# Patient Record
Sex: FEMALE | Race: WHITE | Employment: OTHER | URBAN - NONMETROPOLITAN AREA
[De-identification: names, ages, dates, MRNs, and addresses within clinical notes are randomized per-mention and may not be internally consistent; named-entity substitution may affect disease eponyms.]

---

## 2022-01-01 ENCOUNTER — APPOINTMENT (OUTPATIENT)
Dept: CT IMAGING | Age: 82
DRG: 266 | End: 2022-01-01
Attending: INTERNAL MEDICINE
Payer: MEDICARE

## 2022-01-01 ENCOUNTER — PREP FOR PROCEDURE (OUTPATIENT)
Dept: CARDIOLOGY | Age: 82
End: 2022-01-01

## 2022-01-01 ENCOUNTER — CARE COORDINATION (OUTPATIENT)
Dept: CARE COORDINATION | Age: 82
End: 2022-01-01

## 2022-01-01 ENCOUNTER — HOSPITAL ENCOUNTER (OUTPATIENT)
Dept: CARDIAC REHAB | Age: 82
Setting detail: THERAPIES SERIES
Discharge: HOME | End: 2022-09-01
Payer: MEDICARE

## 2022-01-01 ENCOUNTER — HOSPITAL ENCOUNTER (OUTPATIENT)
Dept: CARDIAC REHAB | Age: 82
Setting detail: THERAPIES SERIES
Discharge: HOME | End: 2022-09-08
Payer: MEDICARE

## 2022-01-01 ENCOUNTER — HOSPITAL ENCOUNTER (OUTPATIENT)
Age: 82
Setting detail: OUTPATIENT SURGERY
Discharge: HOME | End: 2022-06-23
Attending: INTERNAL MEDICINE | Admitting: INTERNAL MEDICINE
Payer: MEDICARE

## 2022-01-01 ENCOUNTER — APPOINTMENT (OUTPATIENT)
Dept: GENERAL RADIOLOGY | Age: 82
DRG: 266 | End: 2022-01-01
Attending: INTERNAL MEDICINE
Payer: MEDICARE

## 2022-01-01 ENCOUNTER — HOSPITAL ENCOUNTER (OUTPATIENT)
Age: 82
Discharge: HOME | End: 2022-07-12
Payer: MEDICARE

## 2022-01-01 ENCOUNTER — APPOINTMENT (OUTPATIENT)
Dept: CARDIAC REHAB | Age: 82
End: 2022-01-01
Payer: MEDICARE

## 2022-01-01 ENCOUNTER — TELEPHONE (OUTPATIENT)
Dept: CARDIOLOGY CLINIC | Age: 82
End: 2022-01-01

## 2022-01-01 ENCOUNTER — HOSPITAL ENCOUNTER (OUTPATIENT)
Age: 82
Setting detail: OBSERVATION
Discharge: HOME | End: 2022-05-27
Attending: FAMILY MEDICINE | Admitting: INTERNAL MEDICINE
Payer: MEDICARE

## 2022-01-01 ENCOUNTER — HOSPITAL ENCOUNTER (OUTPATIENT)
Dept: CARDIAC REHAB | Age: 82
Setting detail: THERAPIES SERIES
Discharge: HOME | End: 2022-08-26
Payer: MEDICARE

## 2022-01-01 ENCOUNTER — OFFICE VISIT (OUTPATIENT)
Dept: CARDIOTHORACIC SURGERY | Age: 82
End: 2022-01-01
Payer: MEDICARE

## 2022-01-01 ENCOUNTER — APPOINTMENT (OUTPATIENT)
Dept: CARDIAC CATH/INVASIVE PROCEDURES | Age: 82
DRG: 266 | End: 2022-01-01
Attending: INTERNAL MEDICINE
Payer: MEDICARE

## 2022-01-01 ENCOUNTER — OFFICE VISIT (OUTPATIENT)
Dept: CARDIOLOGY | Age: 82
End: 2022-01-01
Payer: MEDICARE

## 2022-01-01 ENCOUNTER — OFFICE VISIT (OUTPATIENT)
Dept: CARDIOLOGY CLINIC | Age: 82
End: 2022-01-01
Payer: MEDICARE

## 2022-01-01 ENCOUNTER — HOSPITAL ENCOUNTER (OUTPATIENT)
Age: 82
Discharge: HOME | End: 2022-05-31
Payer: MEDICARE

## 2022-01-01 ENCOUNTER — TELEPHONE (OUTPATIENT)
Dept: CARDIOLOGY | Age: 82
End: 2022-01-01

## 2022-01-01 ENCOUNTER — HOSPITAL ENCOUNTER (INPATIENT)
Age: 82
LOS: 2 days | Discharge: HOME | DRG: 247 | End: 2022-08-18
Attending: INTERNAL MEDICINE | Admitting: INTERNAL MEDICINE
Payer: MEDICARE

## 2022-01-01 ENCOUNTER — HOSPITAL ENCOUNTER (OUTPATIENT)
Dept: PULMONOLOGY | Age: 82
Discharge: HOME | End: 2022-08-10
Payer: MEDICARE

## 2022-01-01 ENCOUNTER — APPOINTMENT (OUTPATIENT)
Dept: INTERVENTIONAL RADIOLOGY/VASCULAR | Age: 82
DRG: 266 | End: 2022-01-01
Attending: INTERNAL MEDICINE
Payer: MEDICARE

## 2022-01-01 ENCOUNTER — APPOINTMENT (OUTPATIENT)
Dept: NON INVASIVE DIAGNOSTICS | Age: 82
End: 2022-01-01
Payer: MEDICARE

## 2022-01-01 ENCOUNTER — ANESTHESIA EVENT (OUTPATIENT)
Dept: CARDIAC CATH/INVASIVE PROCEDURES | Age: 82
DRG: 266 | End: 2022-01-01
Payer: MEDICARE

## 2022-01-01 ENCOUNTER — HOSPITAL ENCOUNTER (OUTPATIENT)
Dept: CARDIAC CATH/INVASIVE PROCEDURES | Age: 82
Discharge: HOME | End: 2022-06-09
Attending: FAMILY MEDICINE | Admitting: FAMILY MEDICINE
Payer: MEDICARE

## 2022-01-01 ENCOUNTER — ANESTHESIA (OUTPATIENT)
Dept: CARDIAC CATH/INVASIVE PROCEDURES | Age: 82
DRG: 266 | End: 2022-01-01
Payer: MEDICARE

## 2022-01-01 ENCOUNTER — HOSPITAL ENCOUNTER (OUTPATIENT)
Dept: CARDIAC REHAB | Age: 82
Setting detail: THERAPIES SERIES
Discharge: HOME | End: 2022-08-31
Payer: MEDICARE

## 2022-01-01 ENCOUNTER — HOSPITAL ENCOUNTER (INPATIENT)
Dept: INPATIENT UNIT | Age: 82
LOS: 6 days | DRG: 266 | End: 2022-09-20
Attending: INTERNAL MEDICINE | Admitting: INTERNAL MEDICINE
Payer: MEDICARE

## 2022-01-01 ENCOUNTER — APPOINTMENT (OUTPATIENT)
Dept: ULTRASOUND IMAGING | Age: 82
DRG: 266 | End: 2022-01-01
Attending: INTERNAL MEDICINE
Payer: MEDICARE

## 2022-01-01 ENCOUNTER — HOSPITAL ENCOUNTER (OUTPATIENT)
Dept: CARDIAC REHAB | Age: 82
Setting detail: THERAPIES SERIES
Discharge: HOME | End: 2022-09-07
Payer: MEDICARE

## 2022-01-01 ENCOUNTER — HOSPITAL ENCOUNTER (OUTPATIENT)
Dept: CARDIAC REHAB | Age: 82
Setting detail: THERAPIES SERIES
Discharge: HOME | End: 2022-08-29
Payer: MEDICARE

## 2022-01-01 ENCOUNTER — APPOINTMENT (OUTPATIENT)
Dept: GENERAL RADIOLOGY | Age: 82
End: 2022-01-01
Payer: MEDICARE

## 2022-01-01 ENCOUNTER — APPOINTMENT (OUTPATIENT)
Dept: MRI IMAGING | Age: 82
DRG: 266 | End: 2022-01-01
Attending: INTERNAL MEDICINE
Payer: MEDICARE

## 2022-01-01 ENCOUNTER — APPOINTMENT (OUTPATIENT)
Dept: CARDIAC CATH/INVASIVE PROCEDURES | Age: 82
DRG: 266 | End: 2022-01-01
Payer: MEDICARE

## 2022-01-01 VITALS
HEART RATE: 62 BPM | TEMPERATURE: 97.6 F | SYSTOLIC BLOOD PRESSURE: 142 MMHG | BODY MASS INDEX: 21.53 KG/M2 | RESPIRATION RATE: 17 BRPM | WEIGHT: 126.1 LBS | DIASTOLIC BLOOD PRESSURE: 71 MMHG | HEIGHT: 64 IN | OXYGEN SATURATION: 99 %

## 2022-01-01 VITALS
HEART RATE: 66 BPM | HEIGHT: 64 IN | RESPIRATION RATE: 18 BRPM | SYSTOLIC BLOOD PRESSURE: 124 MMHG | BODY MASS INDEX: 21.51 KG/M2 | WEIGHT: 126 LBS | DIASTOLIC BLOOD PRESSURE: 69 MMHG | OXYGEN SATURATION: 100 %

## 2022-01-01 VITALS
DIASTOLIC BLOOD PRESSURE: 77 MMHG | HEART RATE: 73 BPM | SYSTOLIC BLOOD PRESSURE: 123 MMHG | RESPIRATION RATE: 18 BRPM | WEIGHT: 112 LBS | BODY MASS INDEX: 19.12 KG/M2 | HEIGHT: 64 IN | OXYGEN SATURATION: 99 %

## 2022-01-01 VITALS
SYSTOLIC BLOOD PRESSURE: 109 MMHG | RESPIRATION RATE: 10 BRPM | OXYGEN SATURATION: 91 % | WEIGHT: 132.06 LBS | DIASTOLIC BLOOD PRESSURE: 75 MMHG | BODY MASS INDEX: 22.55 KG/M2 | HEIGHT: 64 IN | TEMPERATURE: 100.6 F | HEART RATE: 77 BPM

## 2022-01-01 VITALS
OXYGEN SATURATION: 98 % | TEMPERATURE: 97.5 F | DIASTOLIC BLOOD PRESSURE: 82 MMHG | RESPIRATION RATE: 16 BRPM | WEIGHT: 123.68 LBS | SYSTOLIC BLOOD PRESSURE: 139 MMHG | HEART RATE: 70 BPM | BODY MASS INDEX: 21.11 KG/M2 | HEIGHT: 64 IN

## 2022-01-01 VITALS
WEIGHT: 114.2 LBS | DIASTOLIC BLOOD PRESSURE: 69 MMHG | BODY MASS INDEX: 19.5 KG/M2 | HEART RATE: 72 BPM | SYSTOLIC BLOOD PRESSURE: 110 MMHG | RESPIRATION RATE: 18 BRPM | HEIGHT: 64 IN | OXYGEN SATURATION: 99 %

## 2022-01-01 VITALS
OXYGEN SATURATION: 99 % | DIASTOLIC BLOOD PRESSURE: 68 MMHG | SYSTOLIC BLOOD PRESSURE: 104 MMHG | HEIGHT: 64 IN | BODY MASS INDEX: 18.68 KG/M2 | HEART RATE: 64 BPM | RESPIRATION RATE: 16 BRPM | TEMPERATURE: 97.5 F | WEIGHT: 109.4 LBS

## 2022-01-01 VITALS
HEART RATE: 76 BPM | DIASTOLIC BLOOD PRESSURE: 52 MMHG | WEIGHT: 110 LBS | HEIGHT: 64 IN | BODY MASS INDEX: 18.78 KG/M2 | SYSTOLIC BLOOD PRESSURE: 94 MMHG

## 2022-01-01 VITALS
RESPIRATION RATE: 18 BRPM | DIASTOLIC BLOOD PRESSURE: 71 MMHG | SYSTOLIC BLOOD PRESSURE: 148 MMHG | BODY MASS INDEX: 20.86 KG/M2 | HEART RATE: 70 BPM | WEIGHT: 122.2 LBS | TEMPERATURE: 97.8 F | OXYGEN SATURATION: 94 % | HEIGHT: 64 IN

## 2022-01-01 VITALS
DIASTOLIC BLOOD PRESSURE: 86 MMHG | BODY MASS INDEX: 21.27 KG/M2 | RESPIRATION RATE: 18 BRPM | WEIGHT: 124.6 LBS | SYSTOLIC BLOOD PRESSURE: 135 MMHG | HEIGHT: 64 IN | HEART RATE: 87 BPM | OXYGEN SATURATION: 99 %

## 2022-01-01 VITALS
HEIGHT: 64 IN | OXYGEN SATURATION: 96 % | WEIGHT: 110 LBS | BODY MASS INDEX: 18.78 KG/M2 | DIASTOLIC BLOOD PRESSURE: 52 MMHG | HEART RATE: 76 BPM | SYSTOLIC BLOOD PRESSURE: 94 MMHG

## 2022-01-01 DIAGNOSIS — I34.0 NONRHEUMATIC MITRAL VALVE REGURGITATION: ICD-10-CM

## 2022-01-01 DIAGNOSIS — R55 NEAR SYNCOPE: ICD-10-CM

## 2022-01-01 DIAGNOSIS — I34.0 SEVERE MITRAL REGURGITATION BY PRIOR ECHOCARDIOGRAM: ICD-10-CM

## 2022-01-01 DIAGNOSIS — I25.10 CORONARY ARTERY DISEASE INVOLVING NATIVE CORONARY ARTERY OF NATIVE HEART WITHOUT ANGINA PECTORIS: Primary | ICD-10-CM

## 2022-01-01 DIAGNOSIS — E78.2 MIXED HYPERLIPIDEMIA: ICD-10-CM

## 2022-01-01 DIAGNOSIS — R93.1 ABNORMAL FINDINGS ON CARDIAC CATHETERIZATION: Primary | ICD-10-CM

## 2022-01-01 DIAGNOSIS — R06.02 SOB (SHORTNESS OF BREATH): ICD-10-CM

## 2022-01-01 DIAGNOSIS — I42.9 IDIOPATHIC CARDIOMYOPATHY (HCC): ICD-10-CM

## 2022-01-01 DIAGNOSIS — I25.5 ISCHEMIC CARDIOMYOPATHY: ICD-10-CM

## 2022-01-01 DIAGNOSIS — I35.0 AORTIC STENOSIS, SEVERE: ICD-10-CM

## 2022-01-01 DIAGNOSIS — I34.0 SEVERE MITRAL REGURGITATION: Primary | ICD-10-CM

## 2022-01-01 DIAGNOSIS — R93.1 ABNORMAL FINDINGS ON CARDIAC CATHETERIZATION: ICD-10-CM

## 2022-01-01 DIAGNOSIS — I25.10 CORONARY ARTERY DISEASE INVOLVING NATIVE CORONARY ARTERY OF NATIVE HEART WITHOUT ANGINA PECTORIS: ICD-10-CM

## 2022-01-01 DIAGNOSIS — I34.0 NONRHEUMATIC MITRAL VALVE REGURGITATION: Primary | ICD-10-CM

## 2022-01-01 DIAGNOSIS — I34.0 SEVERE MITRAL REGURGITATION: ICD-10-CM

## 2022-01-01 DIAGNOSIS — I10 PRIMARY HYPERTENSION: ICD-10-CM

## 2022-01-01 DIAGNOSIS — N17.9 AKI (ACUTE KIDNEY INJURY) (HCC): ICD-10-CM

## 2022-01-01 DIAGNOSIS — I27.20 SEVERE PULMONARY HYPERTENSION (HCC): ICD-10-CM

## 2022-01-01 DIAGNOSIS — R58 RETROPERITONEAL BLEED: ICD-10-CM

## 2022-01-01 DIAGNOSIS — I25.10 ASHD (ARTERIOSCLEROTIC HEART DISEASE): Primary | ICD-10-CM

## 2022-01-01 DIAGNOSIS — R42 LIGHTHEADED: ICD-10-CM

## 2022-01-01 DIAGNOSIS — R55 NEAR SYNCOPE: Primary | ICD-10-CM

## 2022-01-01 DIAGNOSIS — I50.22 CHRONIC SYSTOLIC CONGESTIVE HEART FAILURE, NYHA CLASS 2 (HCC): Primary | ICD-10-CM

## 2022-01-01 DIAGNOSIS — I42.0 DILATED CARDIOMYOPATHY (HCC): ICD-10-CM

## 2022-01-01 DIAGNOSIS — I34.0 SEVERE MITRAL REGURGITATION BY PRIOR ECHOCARDIOGRAM: Primary | ICD-10-CM

## 2022-01-01 DIAGNOSIS — I10 ESSENTIAL HYPERTENSION: ICD-10-CM

## 2022-01-01 DIAGNOSIS — R94.31 ABNORMAL EKG: ICD-10-CM

## 2022-01-01 DIAGNOSIS — I05.0 MILD MITRAL STENOSIS: ICD-10-CM

## 2022-01-01 DIAGNOSIS — I50.22 CHRONIC SYSTOLIC CONGESTIVE HEART FAILURE, NYHA CLASS 2 (HCC): ICD-10-CM

## 2022-01-01 DIAGNOSIS — R77.8 ELEVATED TROPONIN: ICD-10-CM

## 2022-01-01 LAB
-: NORMAL
ABO: NORMAL
ABSOLUTE EOS #: 0.04 K/UL (ref 0–0.44)
ABSOLUTE EOS #: 0.05 K/UL (ref 0–0.44)
ABSOLUTE EOS #: 0.08 K/UL (ref 0–0.44)
ABSOLUTE EOS #: <0.03 K/UL (ref 0–0.44)
ABSOLUTE IMMATURE GRANULOCYTE: <0.03 K/UL (ref 0–0.3)
ABSOLUTE LYMPH #: 1.22 K/UL (ref 1.1–3.7)
ABSOLUTE LYMPH #: 1.39 K/UL (ref 1.1–3.7)
ABSOLUTE LYMPH #: 1.76 K/UL (ref 1.1–3.7)
ABSOLUTE LYMPH #: 1.9 K/UL (ref 1.1–3.7)
ABSOLUTE MONO #: 0.56 K/UL (ref 0.1–1.2)
ABSOLUTE MONO #: 0.61 K/UL (ref 0.1–1.2)
ABSOLUTE MONO #: 0.67 K/UL (ref 0.1–1.2)
ABSOLUTE MONO #: 0.68 K/UL (ref 0.1–1.2)
ACINETOBACTER CALCOACETICUS-BAUMANNII BY PCR: NOT DETECTED
ACTIVATED CLOTTING TIME: 167 SECONDS (ref 1–150)
ACTIVATED CLOTTING TIME: 271 SECONDS (ref 1–150)
ACTIVATED CLOTTING TIME: 335 SECONDS (ref 1–150)
ACTIVATED CLOTTING TIME: 387 SECONDS (ref 1–150)
ADENOVIRUS BY PCR: NOT DETECTED
ALBUMIN SERPL-MCNC: 3.1 G/DL (ref 3.5–5.1)
ALBUMIN SERPL-MCNC: 3.4 G/DL (ref 3.5–5.1)
ALBUMIN SERPL-MCNC: 3.7 G/DL (ref 3.5–5.1)
ALBUMIN SERPL-MCNC: 3.8 G/DL (ref 3.5–5.1)
ALBUMIN SERPL-MCNC: 4 G/DL (ref 3.5–5.1)
ALBUMIN SERPL-MCNC: 4.2 G/DL (ref 3.5–5.2)
ALBUMIN SERPL-MCNC: 4.2 G/DL (ref 3.5–5.2)
ALBUMIN SERPL-MCNC: 4.3 G/DL (ref 3.5–5.1)
ALBUMIN SERPL-MCNC: 4.5 G/DL (ref 3.5–5.1)
ALBUMIN SERPL-MCNC: 4.7 G/DL (ref 3.5–5.1)
ALBUMIN/GLOBULIN RATIO: 1.6 (ref 1–2.5)
ALBUMIN/GLOBULIN RATIO: 1.8 (ref 1–2.5)
ALLEN TEST: ABNORMAL
ALLEN TEST: POSITIVE
ALLEN TEST: POSITIVE
ALP BLD-CCNC: 105 U/L (ref 38–126)
ALP BLD-CCNC: 106 U/L (ref 38–126)
ALP BLD-CCNC: 113 U/L (ref 38–126)
ALP BLD-CCNC: 187 U/L (ref 38–126)
ALP BLD-CCNC: 236 U/L (ref 38–126)
ALP BLD-CCNC: 41 U/L (ref 38–126)
ALP BLD-CCNC: 69 U/L (ref 35–104)
ALP BLD-CCNC: 78 U/L (ref 35–104)
ALP BLD-CCNC: 78 U/L (ref 38–126)
ALP BLD-CCNC: 81 U/L (ref 38–126)
ALT SERPL-CCNC: 10 U/L (ref 11–66)
ALT SERPL-CCNC: 13 U/L (ref 5–33)
ALT SERPL-CCNC: 19 U/L (ref 11–66)
ALT SERPL-CCNC: 21 U/L (ref 5–33)
ALT SERPL-CCNC: 210 U/L (ref 11–66)
ALT SERPL-CCNC: 244 U/L (ref 11–66)
ALT SERPL-CCNC: 425 U/L (ref 11–66)
ALT SERPL-CCNC: 494 U/L (ref 11–66)
ALT SERPL-CCNC: 509 U/L (ref 11–66)
ALT SERPL-CCNC: 637 U/L (ref 11–66)
ANION GAP SERPL CALCULATED.3IONS-SCNC: 10 MMOL/L (ref 9–17)
ANION GAP SERPL CALCULATED.3IONS-SCNC: 10 MMOL/L (ref 9–17)
ANION GAP SERPL CALCULATED.3IONS-SCNC: 11 MEQ/L (ref 8–16)
ANION GAP SERPL CALCULATED.3IONS-SCNC: 11 MEQ/L (ref 8–16)
ANION GAP SERPL CALCULATED.3IONS-SCNC: 12 MEQ/L (ref 8–16)
ANION GAP SERPL CALCULATED.3IONS-SCNC: 12 MMOL/L (ref 9–17)
ANION GAP SERPL CALCULATED.3IONS-SCNC: 13 MEQ/L (ref 8–16)
ANION GAP SERPL CALCULATED.3IONS-SCNC: 14 MMOL/L (ref 9–17)
ANION GAP SERPL CALCULATED.3IONS-SCNC: 15 MEQ/L (ref 8–16)
ANION GAP SERPL CALCULATED.3IONS-SCNC: 16 MEQ/L (ref 8–16)
ANION GAP SERPL CALCULATED.3IONS-SCNC: 17 MEQ/L (ref 8–16)
ANION GAP SERPL CALCULATED.3IONS-SCNC: 17 MEQ/L (ref 8–16)
ANION GAP SERPL CALCULATED.3IONS-SCNC: 18 MEQ/L (ref 8–16)
ANION GAP SERPL CALCULATED.3IONS-SCNC: 20 MEQ/L (ref 8–16)
ANION GAP SERPL CALCULATED.3IONS-SCNC: 20 MEQ/L (ref 8–16)
ANION GAP SERPL CALCULATED.3IONS-SCNC: 22 MEQ/L (ref 8–16)
ANION GAP SERPL CALCULATED.3IONS-SCNC: 23 MEQ/L (ref 8–16)
ANION GAP SERPL CALCULATED.3IONS-SCNC: 29 MEQ/L (ref 8–16)
ANION GAP SERPL CALCULATED.3IONS-SCNC: 8 MEQ/L (ref 8–16)
ANION GAP SERPL CALCULATED.3IONS-SCNC: 9 MMOL/L (ref 9–17)
ANTIBODY SCREEN: NORMAL
APTT: 110.3 SECONDS (ref 22–38)
APTT: 32.8 SECONDS (ref 22–38)
AST SERPL-CCNC: 1322 U/L (ref 5–40)
AST SERPL-CCNC: 1658 U/L (ref 5–40)
AST SERPL-CCNC: 17 U/L
AST SERPL-CCNC: 19 U/L (ref 5–40)
AST SERPL-CCNC: 24 U/L
AST SERPL-CCNC: 245 U/L (ref 5–40)
AST SERPL-CCNC: 38 U/L (ref 5–40)
AST SERPL-CCNC: 663 U/L (ref 5–40)
AST SERPL-CCNC: 714 U/L (ref 5–40)
AST SERPL-CCNC: > 7000 U/L (ref 5–40)
AVERAGE GLUCOSE: 114 MG/DL (ref 70–126)
AVERAGE GLUCOSE: 150 MG/DL (ref 70–126)
BASE EXCESS (CALCULATED): -10.5 MMOL/L (ref -2.5–2.5)
BASE EXCESS (CALCULATED): -10.5 MMOL/L (ref -2.5–2.5)
BASE EXCESS (CALCULATED): -13.6 MMOL/L (ref -2.5–2.5)
BASE EXCESS (CALCULATED): -2 MMOL/L (ref -2.5–2.5)
BASE EXCESS (CALCULATED): -8.5 MMOL/L (ref -2.5–2.5)
BASE EXCESS (CALCULATED): 11.3 MMOL/L (ref -2.5–2.5)
BASE EXCESS (CALCULATED): 2.5 MMOL/L (ref -2.5–2.5)
BASE EXCESS (CALCULATED): 3 MMOL/L (ref -2.5–2.5)
BASE EXCESS (CALCULATED): 5.6 MMOL/L (ref -2.5–2.5)
BASE EXCESS (CALCULATED): 5.8 MMOL/L (ref -2.5–2.5)
BASE EXCESS (CALCULATED): 7.5 MMOL/L (ref -2.5–2.5)
BASE EXCESS (CALCULATED): 7.6 MMOL/L (ref -2.5–2.5)
BASE EXCESS (CALCULATED): 8.8 MMOL/L (ref -2.5–2.5)
BASE EXCESS MIXED: -1.5 MMOL/L (ref -2–3)
BASE EXCESS MIXED: -2.1 MMOL/L (ref -2–3)
BASE EXCESS MIXED: 2.7 MMOL/L (ref -2–3)
BASOPHILS # BLD: 0 %
BASOPHILS # BLD: 0 % (ref 0–2)
BASOPHILS # BLD: 0.1 %
BASOPHILS # BLD: 0.2 %
BASOPHILS # BLD: 0.2 %
BASOPHILS # BLD: 1 % (ref 0–2)
BASOPHILS ABSOLUTE: 0 THOU/MM3 (ref 0–0.1)
BASOPHILS ABSOLUTE: 0.03 K/UL (ref 0–0.2)
BASOPHILS ABSOLUTE: <0.03 K/UL (ref 0–0.2)
BETA-HYDROXYBUTYRATE: 3.55 MG/DL (ref 0.2–2.81)
BILIRUB SERPL-MCNC: 0.38 MG/DL (ref 0.3–1.2)
BILIRUB SERPL-MCNC: 0.5 MG/DL (ref 0.3–1.2)
BILIRUB SERPL-MCNC: 0.66 MG/DL (ref 0.3–1.2)
BILIRUB SERPL-MCNC: 1 MG/DL (ref 0.3–1.2)
BILIRUB SERPL-MCNC: 2.4 MG/DL (ref 0.3–1.2)
BILIRUB SERPL-MCNC: 3.7 MG/DL (ref 0.3–1.2)
BILIRUB SERPL-MCNC: 4 MG/DL (ref 0.3–1.2)
BILIRUB SERPL-MCNC: 5 MG/DL (ref 0.3–1.2)
BILIRUB SERPL-MCNC: 5.3 MG/DL (ref 0.3–1.2)
BILIRUB SERPL-MCNC: 5.5 MG/DL (ref 0.3–1.2)
BILIRUBIN DIRECT: 1.1 MG/DL (ref 0–0.3)
BILIRUBIN DIRECT: 1.6 MG/DL (ref 0–0.3)
BILIRUBIN URINE: NEGATIVE
BILIRUBIN URINE: NEGATIVE
BLOOD, URINE: NEGATIVE
BUN BLDV-MCNC: 16 MG/DL (ref 8–23)
BUN BLDV-MCNC: 18 MG/DL (ref 8–23)
BUN BLDV-MCNC: 20 MG/DL (ref 7–22)
BUN BLDV-MCNC: 20 MG/DL (ref 8–23)
BUN BLDV-MCNC: 20 MG/DL (ref 8–23)
BUN BLDV-MCNC: 21 MG/DL (ref 7–22)
BUN BLDV-MCNC: 21 MG/DL (ref 7–22)
BUN BLDV-MCNC: 22 MG/DL (ref 8–23)
BUN BLDV-MCNC: 23 MG/DL (ref 7–22)
BUN BLDV-MCNC: 24 MG/DL (ref 7–22)
BUN BLDV-MCNC: 24 MG/DL (ref 7–22)
BUN BLDV-MCNC: 25 MG/DL (ref 7–22)
BUN BLDV-MCNC: 25 MG/DL (ref 7–22)
BUN BLDV-MCNC: 27 MG/DL (ref 7–22)
BUN BLDV-MCNC: 27 MG/DL (ref 7–22)
BUN BLDV-MCNC: 29 MG/DL (ref 7–22)
BUN BLDV-MCNC: 29 MG/DL (ref 7–22)
BUN BLDV-MCNC: 31 MG/DL (ref 7–22)
BUN BLDV-MCNC: 36 MG/DL (ref 7–22)
BUN BLDV-MCNC: 43 MG/DL (ref 7–22)
BUN BLDV-MCNC: 48 MG/DL (ref 7–22)
BUN BLDV-MCNC: 52 MG/DL (ref 7–22)
BUN BLDV-MCNC: 54 MG/DL (ref 7–22)
BUN BLDV-MCNC: 54 MG/DL (ref 7–22)
BUN BLDV-MCNC: 56 MG/DL (ref 7–22)
BUN/CREAT BLD: 11 (ref 9–20)
BUN/CREAT BLD: 13 (ref 9–20)
BUN/CREAT BLD: 14 (ref 9–20)
CALCIUM IONIZED SERUM: 1.07 MMOL/L (ref 1.12–1.32)
CALCIUM IONIZED SERUM: 2.85 MMOL/L (ref 1.12–1.32)
CALCIUM IONIZED: 0.85 MMOL/L (ref 1.12–1.32)
CALCIUM IONIZED: 0.93 MMOL/L (ref 1.12–1.32)
CALCIUM IONIZED: 0.94 MMOL/L (ref 1.12–1.32)
CALCIUM IONIZED: 1.01 MMOL/L (ref 1.12–1.32)
CALCIUM IONIZED: 1.04 MMOL/L (ref 1.12–1.32)
CALCIUM IONIZED: 1.05 MMOL/L (ref 1.12–1.32)
CALCIUM IONIZED: 1.09 MMOL/L (ref 1.12–1.32)
CALCIUM IONIZED: 1.13 MMOL/L (ref 1.12–1.32)
CALCIUM IONIZED: 1.25 MMOL/L (ref 1.12–1.32)
CALCIUM SERPL-MCNC: 10 MG/DL (ref 8.5–10.5)
CALCIUM SERPL-MCNC: 10.2 MG/DL (ref 8.5–10.5)
CALCIUM SERPL-MCNC: 10.2 MG/DL (ref 8.5–10.5)
CALCIUM SERPL-MCNC: 8.2 MG/DL (ref 8.5–10.5)
CALCIUM SERPL-MCNC: 8.4 MG/DL (ref 8.5–10.5)
CALCIUM SERPL-MCNC: 8.5 MG/DL (ref 8.5–10.5)
CALCIUM SERPL-MCNC: 8.5 MG/DL (ref 8.5–10.5)
CALCIUM SERPL-MCNC: 8.5 MG/DL (ref 8.6–10.4)
CALCIUM SERPL-MCNC: 8.6 MG/DL (ref 8.5–10.5)
CALCIUM SERPL-MCNC: 8.6 MG/DL (ref 8.5–10.5)
CALCIUM SERPL-MCNC: 8.7 MG/DL (ref 8.6–10.4)
CALCIUM SERPL-MCNC: 8.8 MG/DL (ref 8.5–10.5)
CALCIUM SERPL-MCNC: 8.9 MG/DL (ref 8.5–10.5)
CALCIUM SERPL-MCNC: 9 MG/DL (ref 8.5–10.5)
CALCIUM SERPL-MCNC: 9 MG/DL (ref 8.6–10.4)
CALCIUM SERPL-MCNC: 9 MG/DL (ref 8.6–10.4)
CALCIUM SERPL-MCNC: 9.1 MG/DL (ref 8.6–10.4)
CALCIUM SERPL-MCNC: 9.2 MG/DL (ref 8.5–10.5)
CALCIUM SERPL-MCNC: 9.2 MG/DL (ref 8.5–10.5)
CALCIUM SERPL-MCNC: 9.3 MG/DL (ref 8.5–10.5)
CALCIUM SERPL-MCNC: 9.4 MG/DL (ref 8.5–10.5)
CALCIUM SERPL-MCNC: 9.6 MG/DL (ref 8.5–10.5)
CALCIUM SERPL-MCNC: 9.6 MG/DL (ref 8.5–10.5)
CALCIUM SERPL-MCNC: 9.7 MG/DL (ref 8.5–10.5)
CALCIUM SERPL-MCNC: 9.7 MG/DL (ref 8.5–10.5)
CHARACTER, URINE: CLEAR
CHLAMYDIA PNEUMONIAE BY PCR: NOT DETECTED
CHLORIDE BLD-SCNC: 100 MEQ/L (ref 98–111)
CHLORIDE BLD-SCNC: 100 MMOL/L (ref 98–107)
CHLORIDE BLD-SCNC: 101 MEQ/L (ref 98–111)
CHLORIDE BLD-SCNC: 101 MMOL/L (ref 98–107)
CHLORIDE BLD-SCNC: 102 MEQ/L (ref 98–111)
CHLORIDE BLD-SCNC: 103 MEQ/L (ref 98–111)
CHLORIDE BLD-SCNC: 104 MEQ/L (ref 98–111)
CHLORIDE BLD-SCNC: 105 MEQ/L (ref 98–111)
CHLORIDE BLD-SCNC: 106 MEQ/L (ref 98–111)
CHLORIDE BLD-SCNC: 107 MEQ/L (ref 98–111)
CHLORIDE BLD-SCNC: 107 MEQ/L (ref 98–111)
CHLORIDE BLD-SCNC: 109 MEQ/L (ref 98–111)
CHLORIDE BLD-SCNC: 111 MEQ/L (ref 98–111)
CHLORIDE BLD-SCNC: 95 MMOL/L (ref 98–107)
CHLORIDE BLD-SCNC: 98 MEQ/L (ref 98–111)
CHLORIDE BLD-SCNC: 99 MEQ/L (ref 98–111)
CHLORIDE BLD-SCNC: 99 MEQ/L (ref 98–111)
CHLORIDE, WHOLE BLOOD: 105 MEQ/L (ref 98–109)
CHLORIDE, WHOLE BLOOD: 108 MEQ/L (ref 98–109)
CHOLESTEROL, TOTAL: 81 MG/DL (ref 100–199)
CHOLESTEROL/HDL RATIO: 3.2
CHOLESTEROL/HDL RATIO: 4
CHOLESTEROL: 129 MG/DL
CHOLESTEROL: 84 MG/DL
CO2: 16 MEQ/L (ref 23–33)
CO2: 18 MEQ/L (ref 23–33)
CO2: 18 MEQ/L (ref 23–33)
CO2: 19 MEQ/L (ref 23–33)
CO2: 21 MEQ/L (ref 23–33)
CO2: 22 MEQ/L (ref 23–33)
CO2: 22 MEQ/L (ref 23–33)
CO2: 22 MMOL/L (ref 20–31)
CO2: 23 MEQ/L (ref 23–33)
CO2: 23 MEQ/L (ref 23–33)
CO2: 23 MMOL/L (ref 20–31)
CO2: 24 MEQ/L (ref 23–33)
CO2: 24 MMOL/L (ref 20–31)
CO2: 25 MEQ/L (ref 23–33)
CO2: 26 MEQ/L (ref 23–33)
CO2: 26 MEQ/L (ref 23–33)
CO2: 27 MEQ/L (ref 23–33)
CO2: 28 MEQ/L (ref 23–33)
CO2: 30 MEQ/L (ref 23–33)
CO2: 31 MEQ/L (ref 23–33)
COLLECTED BY:: ABNORMAL
COLLECTED BY:: NORMAL
COLLECTED BY:: NORMAL
COLOR: YELLOW
COLOR: YELLOW
CORTISOL COLLECTION INFO: NORMAL
CORTISOL: 24.19 UG/DL
CREAT SERPL-MCNC: 1.16 MG/DL (ref 0.5–0.9)
CREAT SERPL-MCNC: 1.2 MG/DL (ref 0.4–1.2)
CREAT SERPL-MCNC: 1.29 MG/DL (ref 0.5–0.9)
CREAT SERPL-MCNC: 1.3 MG/DL (ref 0.4–1.2)
CREAT SERPL-MCNC: 1.4 MG/DL (ref 0.4–1.2)
CREAT SERPL-MCNC: 1.46 MG/DL (ref 0.5–0.9)
CREAT SERPL-MCNC: 1.5 MG/DL (ref 0.4–1.2)
CREAT SERPL-MCNC: 1.53 MG/DL (ref 0.5–0.9)
CREAT SERPL-MCNC: 1.6 MG/DL (ref 0.4–1.2)
CREAT SERPL-MCNC: 1.7 MG/DL (ref 0.4–1.2)
CREAT SERPL-MCNC: 1.8 MG/DL (ref 0.4–1.2)
CREAT SERPL-MCNC: 1.8 MG/DL (ref 0.4–1.2)
CREAT SERPL-MCNC: 2.09 MG/DL (ref 0.5–0.9)
CREAT SERPL-MCNC: 2.1 MG/DL (ref 0.4–1.2)
CREAT SERPL-MCNC: 2.2 MG/DL (ref 0.4–1.2)
DEVICE: ABNORMAL
DEVICE: NORMAL
DEVICE: NORMAL
DISTANCE WALKED: 1132 FT
EKG ATRIAL RATE: 100 BPM
EKG ATRIAL RATE: 105 BPM
EKG ATRIAL RATE: 133 BPM
EKG ATRIAL RATE: 44 BPM
EKG ATRIAL RATE: 61 BPM
EKG ATRIAL RATE: 66 BPM
EKG ATRIAL RATE: 72 BPM
EKG ATRIAL RATE: 78 BPM
EKG ATRIAL RATE: 83 BPM
EKG ATRIAL RATE: 88 BPM
EKG ATRIAL RATE: 96 BPM
EKG P AXIS: -13 DEGREES
EKG P AXIS: -25 DEGREES
EKG P AXIS: -50 DEGREES
EKG P AXIS: 16 DEGREES
EKG P AXIS: 20 DEGREES
EKG P AXIS: 34 DEGREES
EKG P AXIS: 52 DEGREES
EKG P AXIS: 58 DEGREES
EKG P AXIS: 64 DEGREES
EKG P AXIS: 67 DEGREES
EKG P AXIS: 68 DEGREES
EKG P-R INTERVAL: 112 MS
EKG P-R INTERVAL: 136 MS
EKG P-R INTERVAL: 146 MS
EKG P-R INTERVAL: 148 MS
EKG P-R INTERVAL: 154 MS
EKG P-R INTERVAL: 160 MS
EKG P-R INTERVAL: 162 MS
EKG P-R INTERVAL: 164 MS
EKG P-R INTERVAL: 170 MS
EKG P-R INTERVAL: 170 MS
EKG P-R INTERVAL: 96 MS
EKG Q-T INTERVAL: 232 MS
EKG Q-T INTERVAL: 346 MS
EKG Q-T INTERVAL: 352 MS
EKG Q-T INTERVAL: 370 MS
EKG Q-T INTERVAL: 372 MS
EKG Q-T INTERVAL: 384 MS
EKG Q-T INTERVAL: 414 MS
EKG Q-T INTERVAL: 442 MS
EKG Q-T INTERVAL: 462 MS
EKG Q-T INTERVAL: 474 MS
EKG Q-T INTERVAL: 476 MS
EKG Q-T INTERVAL: 484 MS
EKG Q-T INTERVAL: 502 MS
EKG QRS DURATION: 110 MS
EKG QRS DURATION: 112 MS
EKG QRS DURATION: 112 MS
EKG QRS DURATION: 114 MS
EKG QRS DURATION: 114 MS
EKG QRS DURATION: 118 MS
EKG QRS DURATION: 120 MS
EKG QRS DURATION: 122 MS
EKG QRS DURATION: 124 MS
EKG QRS DURATION: 126 MS
EKG QRS DURATION: 132 MS
EKG QRS DURATION: 136 MS
EKG QRS DURATION: 150 MS
EKG QTC CALCULATION (BAZETT): 410 MS
EKG QTC CALCULATION (BAZETT): 413 MS
EKG QTC CALCULATION (BAZETT): 461 MS
EKG QTC CALCULATION (BAZETT): 469 MS
EKG QTC CALCULATION (BAZETT): 489 MS
EKG QTC CALCULATION (BAZETT): 495 MS
EKG QTC CALCULATION (BAZETT): 499 MS
EKG QTC CALCULATION (BAZETT): 500 MS
EKG QTC CALCULATION (BAZETT): 503 MS
EKG QTC CALCULATION (BAZETT): 505 MS
EKG QTC CALCULATION (BAZETT): 505 MS
EKG QTC CALCULATION (BAZETT): 514 MS
EKG QTC CALCULATION (BAZETT): 556 MS
EKG R AXIS: -15 DEGREES
EKG R AXIS: -30 DEGREES
EKG R AXIS: -35 DEGREES
EKG R AXIS: -36 DEGREES
EKG R AXIS: -43 DEGREES
EKG R AXIS: -43 DEGREES
EKG R AXIS: -47 DEGREES
EKG R AXIS: -48 DEGREES
EKG R AXIS: -53 DEGREES
EKG R AXIS: -56 DEGREES
EKG R AXIS: 100 DEGREES
EKG R AXIS: 60 DEGREES
EKG R AXIS: 87 DEGREES
EKG T AXIS: -38 DEGREES
EKG T AXIS: 103 DEGREES
EKG T AXIS: 106 DEGREES
EKG T AXIS: 113 DEGREES
EKG T AXIS: 116 DEGREES
EKG T AXIS: 34 DEGREES
EKG T AXIS: 47 DEGREES
EKG T AXIS: 49 DEGREES
EKG T AXIS: 55 DEGREES
EKG T AXIS: 63 DEGREES
EKG T AXIS: 70 DEGREES
EKG T AXIS: 74 DEGREES
EKG T AXIS: 96 DEGREES
EKG VENTRICULAR RATE: 100 BPM
EKG VENTRICULAR RATE: 103 BPM
EKG VENTRICULAR RATE: 105 BPM
EKG VENTRICULAR RATE: 133 BPM
EKG VENTRICULAR RATE: 188 BPM
EKG VENTRICULAR RATE: 44 BPM
EKG VENTRICULAR RATE: 61 BPM
EKG VENTRICULAR RATE: 66 BPM
EKG VENTRICULAR RATE: 72 BPM
EKG VENTRICULAR RATE: 78 BPM
EKG VENTRICULAR RATE: 83 BPM
EKG VENTRICULAR RATE: 88 BPM
EKG VENTRICULAR RATE: 96 BPM
ENTEROBACTER CLOACAE COMPLEX BY PCR: NOT DETECTED
EOSINOPHIL # BLD: 0 %
EOSINOPHIL # BLD: 0.6 %
EOSINOPHILS ABSOLUTE: 0 THOU/MM3 (ref 0–0.4)
EOSINOPHILS ABSOLUTE: 0.1 THOU/MM3 (ref 0–0.4)
EOSINOPHILS RELATIVE PERCENT: 0 % (ref 1–4)
EOSINOPHILS RELATIVE PERCENT: 1 % (ref 1–4)
EPITHELIAL CELLS UA: NORMAL /HPF (ref 0–25)
ERYTHROCYTE [DISTWIDTH] IN BLOOD BY AUTOMATED COUNT: 14.2 % (ref 11.5–14.5)
ERYTHROCYTE [DISTWIDTH] IN BLOOD BY AUTOMATED COUNT: 14.3 % (ref 11.5–14.5)
ERYTHROCYTE [DISTWIDTH] IN BLOOD BY AUTOMATED COUNT: 14.7 % (ref 11.5–14.5)
ERYTHROCYTE [DISTWIDTH] IN BLOOD BY AUTOMATED COUNT: 15.1 % (ref 11.5–14.5)
ERYTHROCYTE [DISTWIDTH] IN BLOOD BY AUTOMATED COUNT: 15.4 % (ref 11.5–14.5)
ERYTHROCYTE [DISTWIDTH] IN BLOOD BY AUTOMATED COUNT: 15.4 % (ref 11.5–14.5)
ERYTHROCYTE [DISTWIDTH] IN BLOOD BY AUTOMATED COUNT: 15.7 % (ref 11.5–14.5)
ERYTHROCYTE [DISTWIDTH] IN BLOOD BY AUTOMATED COUNT: 15.8 % (ref 11.5–14.5)
ERYTHROCYTE [DISTWIDTH] IN BLOOD BY AUTOMATED COUNT: 15.8 % (ref 11.5–14.5)
ERYTHROCYTE [DISTWIDTH] IN BLOOD BY AUTOMATED COUNT: 15.9 % (ref 11.5–14.5)
ERYTHROCYTE [DISTWIDTH] IN BLOOD BY AUTOMATED COUNT: 16 % (ref 11.5–14.5)
ERYTHROCYTE [DISTWIDTH] IN BLOOD BY AUTOMATED COUNT: 16.1 % (ref 11.5–14.5)
ERYTHROCYTE [DISTWIDTH] IN BLOOD BY AUTOMATED COUNT: 16.2 % (ref 11.5–14.5)
ERYTHROCYTE [DISTWIDTH] IN BLOOD BY AUTOMATED COUNT: 16.5 % (ref 11.5–14.5)
ERYTHROCYTE [DISTWIDTH] IN BLOOD BY AUTOMATED COUNT: 17.1 % (ref 11.5–14.5)
ERYTHROCYTE [DISTWIDTH] IN BLOOD BY AUTOMATED COUNT: 44.6 FL (ref 35–45)
ERYTHROCYTE [DISTWIDTH] IN BLOOD BY AUTOMATED COUNT: 45.8 FL (ref 35–45)
ERYTHROCYTE [DISTWIDTH] IN BLOOD BY AUTOMATED COUNT: 46 FL (ref 35–45)
ERYTHROCYTE [DISTWIDTH] IN BLOOD BY AUTOMATED COUNT: 47.1 FL (ref 35–45)
ERYTHROCYTE [DISTWIDTH] IN BLOOD BY AUTOMATED COUNT: 48.7 FL (ref 35–45)
ERYTHROCYTE [DISTWIDTH] IN BLOOD BY AUTOMATED COUNT: 49.1 FL (ref 35–45)
ERYTHROCYTE [DISTWIDTH] IN BLOOD BY AUTOMATED COUNT: 49.5 FL (ref 35–45)
ERYTHROCYTE [DISTWIDTH] IN BLOOD BY AUTOMATED COUNT: 50.3 FL (ref 35–45)
ERYTHROCYTE [DISTWIDTH] IN BLOOD BY AUTOMATED COUNT: 51 FL (ref 35–45)
ERYTHROCYTE [DISTWIDTH] IN BLOOD BY AUTOMATED COUNT: 51.3 FL (ref 35–45)
ERYTHROCYTE [DISTWIDTH] IN BLOOD BY AUTOMATED COUNT: 52.8 FL (ref 35–45)
ERYTHROCYTE [DISTWIDTH] IN BLOOD BY AUTOMATED COUNT: 53.4 FL (ref 35–45)
ERYTHROCYTE [DISTWIDTH] IN BLOOD BY AUTOMATED COUNT: 53.9 FL (ref 35–45)
ERYTHROCYTE [DISTWIDTH] IN BLOOD BY AUTOMATED COUNT: 54.3 FL (ref 35–45)
ERYTHROCYTE [DISTWIDTH] IN BLOOD BY AUTOMATED COUNT: 57.7 FL (ref 35–45)
ESCHERICHIA COLI BY PCR: NOT DETECTED
ESTIMATED AVERAGE GLUCOSE: 137 MG/DL
ESTIMATED AVERAGE GLUCOSE: 169 MG/DL
FIO2, MIXED VENOUS: 30
FIO2, MIXED VENOUS: 50
FIO2, MIXED VENOUS: 70
GFR AFRICAN AMERICAN: 27 ML/MIN
GFR AFRICAN AMERICAN: 39 ML/MIN
GFR AFRICAN AMERICAN: 42 ML/MIN
GFR AFRICAN AMERICAN: 48 ML/MIN
GFR AFRICAN AMERICAN: 54 ML/MIN
GFR NON-AFRICAN AMERICAN: 23 ML/MIN
GFR NON-AFRICAN AMERICAN: 32 ML/MIN
GFR NON-AFRICAN AMERICAN: 34 ML/MIN
GFR NON-AFRICAN AMERICAN: 40 ML/MIN
GFR NON-AFRICAN AMERICAN: 45 ML/MIN
GFR SERPL CREATININE-BSD FRML MDRD: 21 ML/MIN/1.73M2
GFR SERPL CREATININE-BSD FRML MDRD: 23 ML/MIN/1.73M2
GFR SERPL CREATININE-BSD FRML MDRD: 27 ML/MIN/1.73M2
GFR SERPL CREATININE-BSD FRML MDRD: 27 ML/MIN/1.73M2
GFR SERPL CREATININE-BSD FRML MDRD: 29 ML/MIN/1.73M2
GFR SERPL CREATININE-BSD FRML MDRD: 31 ML/MIN/1.73M2
GFR SERPL CREATININE-BSD FRML MDRD: 33 ML/MIN/1.73M2
GFR SERPL CREATININE-BSD FRML MDRD: 36 ML/MIN/1.73M2
GFR SERPL CREATININE-BSD FRML MDRD: 39 ML/MIN/1.73M2
GFR SERPL CREATININE-BSD FRML MDRD: 43 ML/MIN/1.73M2
GFR SERPL CREATININE-BSD FRML MDRD: ABNORMAL ML/MIN/{1.73_M2}
GLUCOSE BLD-MCNC: 106 MG/DL (ref 70–108)
GLUCOSE BLD-MCNC: 111 MG/DL (ref 70–108)
GLUCOSE BLD-MCNC: 112 MG/DL (ref 70–108)
GLUCOSE BLD-MCNC: 113 MG/DL (ref 70–99)
GLUCOSE BLD-MCNC: 116 MG/DL (ref 70–108)
GLUCOSE BLD-MCNC: 117 MG/DL (ref 70–108)
GLUCOSE BLD-MCNC: 117 MG/DL (ref 70–99)
GLUCOSE BLD-MCNC: 120 MG/DL (ref 74–100)
GLUCOSE BLD-MCNC: 131 MG/DL (ref 70–108)
GLUCOSE BLD-MCNC: 135 MG/DL (ref 70–99)
GLUCOSE BLD-MCNC: 138 MG/DL (ref 70–108)
GLUCOSE BLD-MCNC: 151 MG/DL (ref 70–108)
GLUCOSE BLD-MCNC: 153 MG/DL (ref 70–108)
GLUCOSE BLD-MCNC: 154 MG/DL (ref 70–108)
GLUCOSE BLD-MCNC: 156 MG/DL (ref 70–108)
GLUCOSE BLD-MCNC: 156 MG/DL (ref 70–108)
GLUCOSE BLD-MCNC: 157 MG/DL (ref 70–108)
GLUCOSE BLD-MCNC: 162 MG/DL (ref 70–108)
GLUCOSE BLD-MCNC: 162 MG/DL (ref 70–108)
GLUCOSE BLD-MCNC: 162 MG/DL (ref 70–99)
GLUCOSE BLD-MCNC: 164 MG/DL (ref 70–108)
GLUCOSE BLD-MCNC: 165 MG/DL (ref 70–108)
GLUCOSE BLD-MCNC: 165 MG/DL (ref 70–99)
GLUCOSE BLD-MCNC: 185 MG/DL (ref 70–108)
GLUCOSE BLD-MCNC: 194 MG/DL (ref 70–108)
GLUCOSE BLD-MCNC: 221 MG/DL (ref 70–108)
GLUCOSE BLD-MCNC: 240 MG/DL (ref 70–108)
GLUCOSE BLD-MCNC: 265 MG/DL (ref 70–108)
GLUCOSE BLD-MCNC: 267 MG/DL (ref 70–108)
GLUCOSE BLD-MCNC: 272 MG/DL (ref 70–108)
GLUCOSE BLD-MCNC: 273 MG/DL (ref 70–108)
GLUCOSE BLD-MCNC: 276 MG/DL (ref 70–108)
GLUCOSE BLD-MCNC: 295 MG/DL (ref 70–108)
GLUCOSE BLD-MCNC: 301 MG/DL (ref 70–108)
GLUCOSE BLD-MCNC: 301 MG/DL (ref 70–108)
GLUCOSE BLD-MCNC: 67 MG/DL (ref 70–108)
GLUCOSE BLD-MCNC: 98 MG/DL (ref 70–108)
GLUCOSE BLD-MCNC: 99 MG/DL (ref 70–108)
GLUCOSE URINE: NEGATIVE
GLUCOSE, URINE: NEGATIVE MG/DL
GLUCOSE, WHOLE BLOOD: 130 MG/DL (ref 70–108)
GLUCOSE, WHOLE BLOOD: 136 MG/DL (ref 70–108)
GLUCOSE, WHOLE BLOOD: 148 MG/DL (ref 70–108)
GRAM STAIN RESULT: NORMAL
HAEMOPHILUS INFLUENZAE BY PCR: NOT DETECTED
HBA1C MFR BLD: 5.8 % (ref 4.4–6.4)
HBA1C MFR BLD: 6.4 % (ref 4–6)
HBA1C MFR BLD: 7 % (ref 4.4–6.4)
HBA1C MFR BLD: 7.5 % (ref 4–6)
HCO3, MIXED: 22 MMOL/L (ref 23–28)
HCO3, MIXED: 24 MMOL/L (ref 23–28)
HCO3, MIXED: 28 MMOL/L (ref 23–28)
HCO3: 13 MMOL/L (ref 23–28)
HCO3: 17 MMOL/L (ref 23–28)
HCO3: 17 MMOL/L (ref 23–28)
HCO3: 21 MMOL/L (ref 23–28)
HCO3: 22 MMOL/L (ref 23–28)
HCO3: 27 MMOL/L (ref 23–28)
HCO3: 28 MMOL/L (ref 23–28)
HCO3: 29 MMOL/L (ref 23–28)
HCO3: 30 MMOL/L (ref 23–28)
HCO3: 32 MMOL/L (ref 23–28)
HCO3: 35 MMOL/L (ref 23–28)
HCT VFR BLD CALC: 21.4 % (ref 37–47)
HCT VFR BLD CALC: 22 % (ref 37–47)
HCT VFR BLD CALC: 22.1 % (ref 37–47)
HCT VFR BLD CALC: 23 % (ref 37–47)
HCT VFR BLD CALC: 25.8 % (ref 37–47)
HCT VFR BLD CALC: 26.4 % (ref 37–47)
HCT VFR BLD CALC: 27.5 % (ref 37–47)
HCT VFR BLD CALC: 27.5 % (ref 37–47)
HCT VFR BLD CALC: 27.6 % (ref 37–47)
HCT VFR BLD CALC: 27.7 % (ref 37–47)
HCT VFR BLD CALC: 28.6 % (ref 37–47)
HCT VFR BLD CALC: 28.8 % (ref 37–47)
HCT VFR BLD CALC: 29.2 % (ref 37–47)
HCT VFR BLD CALC: 29.5 % (ref 37–47)
HCT VFR BLD CALC: 29.6 % (ref 37–47)
HCT VFR BLD CALC: 30.1 % (ref 37–47)
HCT VFR BLD CALC: 32.2 % (ref 37–47)
HCT VFR BLD CALC: 32.9 % (ref 36.3–47.1)
HCT VFR BLD CALC: 33.2 % (ref 36.3–47.1)
HCT VFR BLD CALC: 33.3 % (ref 37–47)
HCT VFR BLD CALC: 33.3 % (ref 37–47)
HCT VFR BLD CALC: 33.5 % (ref 36.3–47.1)
HCT VFR BLD CALC: 34.1 % (ref 37–47)
HCT VFR BLD CALC: 35.2 % (ref 36.3–47.1)
HCT VFR BLD CALC: 37.1 % (ref 37–47)
HCT VFR BLD CALC: 37.8 % (ref 37–47)
HCT VFR BLD CALC: 41.2 % (ref 36.3–47.1)
HCT VFR BLD CALC: 42.5 % (ref 37–47)
HCT VFR BLD CALC: 42.9 % (ref 37–47)
HDLC SERPL-MCNC: 26 MG/DL
HDLC SERPL-MCNC: 31 MG/DL
HDLC SERPL-MCNC: 32 MG/DL
HEMOGLOBIN FINGERSTICK, POC: 7.4 G/DL (ref 12–16)
HEMOGLOBIN FINGERSTICK, POC: 7.4 G/DL (ref 12–16)
HEMOGLOBIN: 10 GM/DL (ref 12–16)
HEMOGLOBIN: 10.2 GM/DL (ref 12–16)
HEMOGLOBIN: 10.2 GM/DL (ref 12–16)
HEMOGLOBIN: 10.3 G/DL (ref 11.9–15.1)
HEMOGLOBIN: 10.5 G/DL (ref 11.9–15.1)
HEMOGLOBIN: 10.7 G/DL (ref 11.9–15.1)
HEMOGLOBIN: 10.8 G/DL (ref 11.9–15.1)
HEMOGLOBIN: 10.8 GM/DL (ref 12–16)
HEMOGLOBIN: 11.3 GM/DL (ref 12–16)
HEMOGLOBIN: 11.4 GM/DL (ref 12–16)
HEMOGLOBIN: 11.5 GM/DL (ref 12–16)
HEMOGLOBIN: 11.7 GM/DL (ref 12–16)
HEMOGLOBIN: 12.5 G/DL (ref 11.9–15.1)
HEMOGLOBIN: 13 GM/DL (ref 12–16)
HEMOGLOBIN: 14.6 GM/DL (ref 12–16)
HEMOGLOBIN: 7.3 GM/DL (ref 12–16)
HEMOGLOBIN: 7.4 GM/DL (ref 12–16)
HEMOGLOBIN: 7.5 GM/DL (ref 12–16)
HEMOGLOBIN: 7.8 GM/DL (ref 12–16)
HEMOGLOBIN: 8.9 GM/DL (ref 12–16)
HEMOGLOBIN: 9.2 GM/DL (ref 12–16)
HEMOGLOBIN: 9.5 GM/DL (ref 12–16)
HEMOGLOBIN: 9.6 GM/DL (ref 12–16)
HEMOGLOBIN: 9.6 GM/DL (ref 12–16)
HEMOGLOBIN: 9.7 GM/DL (ref 12–16)
HEMOGLOBIN: 9.7 GM/DL (ref 12–16)
HEMOGLOBIN: 9.8 GM/DL (ref 12–16)
HEMOGLOBIN: 9.9 GM/DL (ref 12–16)
HEMOGLOBIN: 9.9 GM/DL (ref 12–16)
HEPARIN UNFRACTIONATED: 0.14 U/ML (ref 0.3–0.7)
HEPARIN UNFRACTIONATED: 0.19 U/ML (ref 0.3–0.7)
HEPARIN UNFRACTIONATED: 0.22 U/ML (ref 0.3–0.7)
HEPARIN UNFRACTIONATED: 0.27 U/ML (ref 0.3–0.7)
HEPARIN UNFRACTIONATED: 0.31 U/ML (ref 0.3–0.7)
HEPARIN UNFRACTIONATED: 0.37 U/ML (ref 0.3–0.7)
HEPARIN UNFRACTIONATED: 0.44 U/ML (ref 0.3–0.7)
HEPARIN UNFRACTIONATED: 0.47 U/ML (ref 0.3–0.7)
HEPARIN UNFRACTIONATED: 0.48 U/ML (ref 0.3–0.7)
HEPARIN UNFRACTIONATED: 0.52 U/ML (ref 0.3–0.7)
HEPARIN UNFRACTIONATED: 0.63 U/ML (ref 0.3–0.7)
IFIO2: 100
IFIO2: 100
IFIO2: 30
IFIO2: 50
IFIO2: 50
IFIO2: 60
IFIO2: 70
IFIO2: 70
IMMATURE GRANS (ABS): 0.02 THOU/MM3 (ref 0–0.07)
IMMATURE GRANS (ABS): 0.03 THOU/MM3 (ref 0–0.07)
IMMATURE GRANS (ABS): 0.05 THOU/MM3 (ref 0–0.07)
IMMATURE GRANS (ABS): 0.16 THOU/MM3 (ref 0–0.07)
IMMATURE GRANS (ABS): 0.44 THOU/MM3 (ref 0–0.07)
IMMATURE GRANS (ABS): 0.47 THOU/MM3 (ref 0–0.07)
IMMATURE GRANULOCYTES: 0 %
IMMATURE GRANULOCYTES: 0.2 %
IMMATURE GRANULOCYTES: 0.4 %
IMMATURE GRANULOCYTES: 0.5 %
IMMATURE GRANULOCYTES: 1.3 %
IMMATURE GRANULOCYTES: 2.2 %
IMMATURE GRANULOCYTES: 2.2 %
INFLUENZA A BY PCR: NOT DETECTED
INFLUENZA B BY PCR: NOT DETECTED
INR BLD: 1.09 (ref 0.85–1.13)
INR BLD: 1.33 (ref 0.85–1.13)
INR BLD: 1.74 (ref 0.85–1.13)
INR BLD: 2.57 (ref 0.85–1.13)
KETONES, URINE: NEGATIVE
KETONES, URINE: NEGATIVE
KLEBSIELLA AEROGENES BY PCR: NOT DETECTED
KLEBSIELLA OXYTOCA BY PCR: NOT DETECTED
KLEBSIELLA PNEUMONIAE GROUP BY PCR: NOT DETECTED
LACTIC ACID: 1 MMOL/L (ref 0.5–2)
LACTIC ACID: 1.2 MMOL/L (ref 0.5–2)
LACTIC ACID: 1.5 MMOL/L (ref 0.5–2)
LACTIC ACID: 1.6 MMOL/L (ref 0.5–2)
LACTIC ACID: 11.6 MMOL/L (ref 0.5–2)
LACTIC ACID: 12.6 MMOL/L (ref 0.5–2)
LACTIC ACID: 14.5 MMOL/L (ref 0.5–2)
LACTIC ACID: 14.5 MMOL/L (ref 0.5–2)
LACTIC ACID: 2.1 MMOL/L (ref 0.5–2)
LACTIC ACID: 2.3 MMOL/L (ref 0.5–2)
LACTIC ACID: 2.5 MMOL/L (ref 0.5–2)
LACTIC ACID: 2.7 MMOL/L (ref 0.5–2)
LACTIC ACID: 2.7 MMOL/L (ref 0.5–2)
LACTIC ACID: 3.1 MMOL/L (ref 0.5–2)
LACTIC ACID: 3.5 MMOL/L (ref 0.5–2)
LACTIC ACID: 3.5 MMOL/L (ref 0.5–2)
LACTIC ACID: 4.2 MMOL/L (ref 0.5–2)
LACTIC ACID: 4.6 MMOL/L (ref 0.5–2)
LACTIC ACID: 4.8 MMOL/L (ref 0.5–2)
LACTIC ACID: 7.6 MMOL/L (ref 0.5–2)
LDL CHOLESTEROL CALCULATED: 32 MG/DL
LDL CHOLESTEROL: 41 MG/DL (ref 0–130)
LDL CHOLESTEROL: 77 MG/DL (ref 0–130)
LEGIONELLA PNEUMOPHILIA BY PCR: NOT DETECTED
LEUKOCYTE EST, POC: NEGATIVE
LEUKOCYTE ESTERASE, URINE: NEGATIVE
LV EF: 18 %
LV EF: 23 %
LV EF: 33 %
LV EF: 33 %
LV EF: 38 %
LVEF MODALITY: NORMAL
LYMPHOCYTES # BLD: 15.9 %
LYMPHOCYTES # BLD: 18 % (ref 24–43)
LYMPHOCYTES # BLD: 19.7 %
LYMPHOCYTES # BLD: 20 % (ref 24–43)
LYMPHOCYTES # BLD: 26 % (ref 24–43)
LYMPHOCYTES # BLD: 28 % (ref 24–43)
LYMPHOCYTES # BLD: 4.4 %
LYMPHOCYTES # BLD: 7.4 %
LYMPHOCYTES # BLD: 7.5 %
LYMPHOCYTES # BLD: 8.6 %
LYMPHOCYTES ABSOLUTE: 0.5 THOU/MM3 (ref 1–4.8)
LYMPHOCYTES ABSOLUTE: 0.6 THOU/MM3 (ref 1–4.8)
LYMPHOCYTES ABSOLUTE: 0.8 THOU/MM3 (ref 1–4.8)
LYMPHOCYTES ABSOLUTE: 0.9 THOU/MM3 (ref 1–4.8)
LYMPHOCYTES ABSOLUTE: 3.2 THOU/MM3 (ref 1–4.8)
LYMPHOCYTES ABSOLUTE: 4.2 THOU/MM3 (ref 1–4.8)
MAGNESIUM: 1.4 MG/DL (ref 1.6–2.4)
MAGNESIUM: 1.7 MG/DL (ref 1.6–2.4)
MAGNESIUM: 2 MG/DL (ref 1.6–2.4)
MAGNESIUM: 2.1 MG/DL (ref 1.6–2.4)
MAGNESIUM: 2.1 MG/DL (ref 1.6–2.4)
MAGNESIUM: 2.4 MG/DL (ref 1.6–2.4)
MAGNESIUM: 2.6 MG/DL (ref 1.6–2.4)
MAGNESIUM: 2.7 MG/DL (ref 1.6–2.4)
MAGNESIUM: 2.8 MG/DL (ref 1.6–2.4)
MAGNESIUM: 6.2 MG/DL (ref 1.6–2.4)
MCH RBC QN AUTO: 26.1 PG (ref 26–33)
MCH RBC QN AUTO: 26.4 PG (ref 26–33)
MCH RBC QN AUTO: 26.6 PG (ref 25.2–33.5)
MCH RBC QN AUTO: 26.6 PG (ref 26–33)
MCH RBC QN AUTO: 27.3 PG (ref 25.2–33.5)
MCH RBC QN AUTO: 27.6 PG (ref 25.2–33.5)
MCH RBC QN AUTO: 27.6 PG (ref 25.2–33.5)
MCH RBC QN AUTO: 27.9 PG (ref 25.2–33.5)
MCH RBC QN AUTO: 29.5 PG (ref 26–33)
MCH RBC QN AUTO: 29.6 PG (ref 26–33)
MCH RBC QN AUTO: 29.6 PG (ref 26–33)
MCH RBC QN AUTO: 29.8 PG (ref 26–33)
MCH RBC QN AUTO: 29.8 PG (ref 26–33)
MCH RBC QN AUTO: 29.9 PG (ref 26–33)
MCH RBC QN AUTO: 29.9 PG (ref 26–33)
MCH RBC QN AUTO: 30 PG (ref 26–33)
MCH RBC QN AUTO: 30.1 PG (ref 26–33)
MCH RBC QN AUTO: 30.2 PG (ref 26–33)
MCH RBC QN AUTO: 30.2 PG (ref 26–33)
MCH RBC QN AUTO: 30.5 PG (ref 26–33)
MCHC RBC AUTO-ENTMCNC: 30.3 G/DL (ref 28.4–34.8)
MCHC RBC AUTO-ENTMCNC: 30.4 GM/DL (ref 32.2–35.5)
MCHC RBC AUTO-ENTMCNC: 30.5 GM/DL (ref 32.2–35.5)
MCHC RBC AUTO-ENTMCNC: 30.6 GM/DL (ref 32.2–35.5)
MCHC RBC AUTO-ENTMCNC: 30.6 GM/DL (ref 32.2–35.5)
MCHC RBC AUTO-ENTMCNC: 30.7 G/DL (ref 28.4–34.8)
MCHC RBC AUTO-ENTMCNC: 31.3 G/DL (ref 28.4–34.8)
MCHC RBC AUTO-ENTMCNC: 31.6 G/DL (ref 28.4–34.8)
MCHC RBC AUTO-ENTMCNC: 31.9 G/DL (ref 28.4–34.8)
MCHC RBC AUTO-ENTMCNC: 32.2 GM/DL (ref 32.2–35.5)
MCHC RBC AUTO-ENTMCNC: 32.4 GM/DL (ref 32.2–35.5)
MCHC RBC AUTO-ENTMCNC: 32.9 GM/DL (ref 32.2–35.5)
MCHC RBC AUTO-ENTMCNC: 33 GM/DL (ref 32.2–35.5)
MCHC RBC AUTO-ENTMCNC: 33.9 GM/DL (ref 32.2–35.5)
MCHC RBC AUTO-ENTMCNC: 33.9 GM/DL (ref 32.2–35.5)
MCHC RBC AUTO-ENTMCNC: 34 GM/DL (ref 32.2–35.5)
MCHC RBC AUTO-ENTMCNC: 34.3 GM/DL (ref 32.2–35.5)
MCHC RBC AUTO-ENTMCNC: 34.5 GM/DL (ref 32.2–35.5)
MCHC RBC AUTO-ENTMCNC: 34.5 GM/DL (ref 32.2–35.5)
MCHC RBC AUTO-ENTMCNC: 34.8 GM/DL (ref 32.2–35.5)
MCV RBC AUTO: 85.7 FL (ref 81–99)
MCV RBC AUTO: 86 FL (ref 81–99)
MCV RBC AUTO: 86.3 FL (ref 81–99)
MCV RBC AUTO: 86.4 FL (ref 81–99)
MCV RBC AUTO: 86.6 FL (ref 81–99)
MCV RBC AUTO: 86.9 FL (ref 81–99)
MCV RBC AUTO: 87.1 FL (ref 82.6–102.9)
MCV RBC AUTO: 87.3 FL (ref 81–99)
MCV RBC AUTO: 87.5 FL (ref 82.6–102.9)
MCV RBC AUTO: 87.6 FL (ref 81–99)
MCV RBC AUTO: 87.7 FL (ref 82.6–102.9)
MCV RBC AUTO: 88.2 FL (ref 82.6–102.9)
MCV RBC AUTO: 88.8 FL (ref 81–99)
MCV RBC AUTO: 89.1 FL (ref 82.6–102.9)
MCV RBC AUTO: 89.5 FL (ref 81–99)
MCV RBC AUTO: 89.8 FL (ref 81–99)
MCV RBC AUTO: 91.5 FL (ref 81–99)
MCV RBC AUTO: 91.7 FL (ref 81–99)
MCV RBC AUTO: 92.5 FL (ref 81–99)
MCV RBC AUTO: 97.7 FL (ref 81–99)
METAPNEUMOVIRUS BY PCR: NOT DETECTED
MODE: ABNORMAL
MODE: NORMAL
MONOCYTES # BLD: 10 % (ref 3–12)
MONOCYTES # BLD: 11 % (ref 3–12)
MONOCYTES # BLD: 3.7 %
MONOCYTES # BLD: 5.6 %
MONOCYTES # BLD: 6.7 %
MONOCYTES # BLD: 8 % (ref 3–12)
MONOCYTES # BLD: 8.3 %
MONOCYTES # BLD: 9 % (ref 3–12)
MONOCYTES # BLD: 9.3 %
MONOCYTES # BLD: 9.9 %
MONOCYTES ABSOLUTE: 0.6 THOU/MM3 (ref 0.4–1.3)
MONOCYTES ABSOLUTE: 0.7 THOU/MM3 (ref 0.4–1.3)
MONOCYTES ABSOLUTE: 0.7 THOU/MM3 (ref 0.4–1.3)
MONOCYTES ABSOLUTE: 0.9 THOU/MM3 (ref 0.4–1.3)
MONOCYTES ABSOLUTE: 1.2 THOU/MM3 (ref 0.4–1.3)
MONOCYTES ABSOLUTE: 1.4 THOU/MM3 (ref 0.4–1.3)
MORAXELLA CATARRHALIS BY PCR: NOT DETECTED
MYCOPLASMA PNEUMONIAE BY PCR: NOT DETECTED
NITRITE, URINE: NEGATIVE
NITRITE, URINE: NEGATIVE
NON-SARS CORONAVIRUS: NOT DETECTED
NRBC AUTOMATED: 0 PER 100 WBC
NUCLEATED RED BLOOD CELLS: 0 /100 WBC
NUCLEATED RED BLOOD CELLS: 1 /100 WBC
NUCLEATED RED BLOOD CELLS: 1 /100 WBC
O2 SAT, MIXED: 50 %
O2 SAT, MIXED: 66 %
O2 SAT, MIXED: 66 %
O2 SATURATION: 100 %
O2 SATURATION: 83 %
O2 SATURATION: 96 %
O2 SATURATION: 97 %
O2 SATURATION: 99 %
ORGANISM: ABNORMAL
P2Y12 ASSAY: 326 PRU (ref 180–376)
PARAINFLUENZA VIRUS BY PCR: NOT DETECTED
PCO2, MIXED VENOUS: 36 MMHG (ref 41–51)
PCO2, MIXED VENOUS: 41 MMHG (ref 41–51)
PCO2, MIXED VENOUS: 48 MMHG (ref 41–51)
PCO2: 21 MMHG (ref 35–45)
PCO2: 28 MMHG (ref 35–45)
PCO2: 32 MMHG (ref 35–45)
PCO2: 33 MMHG (ref 35–45)
PCO2: 34 MMHG (ref 35–45)
PCO2: 40 MMHG (ref 35–45)
PCO2: 42 MMHG (ref 35–45)
PCO2: 42 MMHG (ref 35–45)
PCO2: 44 MMHG (ref 35–45)
PCO2: 44 MMHG (ref 35–45)
PCO2: 75 MMHG (ref 35–45)
PDW BLD-RTO: 13.3 % (ref 11.8–14.4)
PDW BLD-RTO: 13.5 % (ref 11.8–14.4)
PDW BLD-RTO: 13.7 % (ref 11.8–14.4)
PDW BLD-RTO: 14.1 % (ref 11.8–14.4)
PDW BLD-RTO: 14.7 % (ref 11.8–14.4)
PH BLOOD GAS: 7.05 (ref 7.35–7.45)
PH BLOOD GAS: 7.19 (ref 7.35–7.45)
PH BLOOD GAS: 7.21 (ref 7.35–7.45)
PH BLOOD GAS: 7.32 (ref 7.35–7.45)
PH BLOOD GAS: 7.41 (ref 7.35–7.45)
PH BLOOD GAS: 7.42 (ref 7.35–7.45)
PH BLOOD GAS: 7.44 (ref 7.35–7.45)
PH BLOOD GAS: 7.47 (ref 7.35–7.45)
PH BLOOD GAS: 7.53 (ref 7.35–7.45)
PH BLOOD GAS: 7.54 (ref 7.35–7.45)
PH BLOOD GAS: 7.56 (ref 7.35–7.45)
PH BLOOD GAS: 7.6 (ref 7.35–7.45)
PH BLOOD GAS: 7.73 (ref 7.35–7.45)
PH UA: 6 (ref 5–9)
PH UA: 7 (ref 5–9)
PH, MIXED: 7.37 (ref 7.31–7.41)
PH, MIXED: 7.38 (ref 7.31–7.41)
PH, MIXED: 7.4 (ref 7.31–7.41)
PHOSPHORUS: 2.7 MG/DL (ref 2.4–4.7)
PHOSPHORUS: 4.5 MG/DL (ref 2.4–4.7)
PHOSPHORUS: 4.8 MG/DL (ref 2.4–4.7)
PHOSPHORUS: 8.2 MG/DL (ref 2.4–4.7)
PIP: 14 CMH2O
PIP: 19 CMH2O
PIP: 19 CMH2O
PIP: 20 CMH2O
PLATELET # BLD: 106 THOU/MM3 (ref 130–400)
PLATELET # BLD: 108 THOU/MM3 (ref 130–400)
PLATELET # BLD: 113 THOU/MM3 (ref 130–400)
PLATELET # BLD: 120 THOU/MM3 (ref 130–400)
PLATELET # BLD: 138 THOU/MM3 (ref 130–400)
PLATELET # BLD: 141 THOU/MM3 (ref 130–400)
PLATELET # BLD: 146 THOU/MM3 (ref 130–400)
PLATELET # BLD: 149 THOU/MM3 (ref 130–400)
PLATELET # BLD: 212 THOU/MM3 (ref 130–400)
PLATELET # BLD: 218 THOU/MM3 (ref 130–400)
PLATELET # BLD: 227 THOU/MM3 (ref 130–400)
PLATELET # BLD: 246 K/UL (ref 138–453)
PLATELET # BLD: 250 K/UL (ref 138–453)
PLATELET # BLD: 260 K/UL (ref 138–453)
PLATELET # BLD: 272 K/UL (ref 138–453)
PLATELET # BLD: 306 K/UL (ref 138–453)
PLATELET # BLD: 71 THOU/MM3 (ref 130–400)
PLATELET # BLD: 85 THOU/MM3 (ref 130–400)
PLATELET # BLD: 88 THOU/MM3 (ref 130–400)
PLATELET # BLD: 89 THOU/MM3 (ref 130–400)
PLATELET ESTIMATE: ABNORMAL
PMV BLD AUTO: 10 FL (ref 9.4–12.4)
PMV BLD AUTO: 10.1 FL (ref 9.4–12.4)
PMV BLD AUTO: 10.1 FL (ref 9.4–12.4)
PMV BLD AUTO: 10.2 FL (ref 8.1–13.5)
PMV BLD AUTO: 10.2 FL (ref 9.4–12.4)
PMV BLD AUTO: 10.2 FL (ref 9.4–12.4)
PMV BLD AUTO: 10.3 FL (ref 9.4–12.4)
PMV BLD AUTO: 10.5 FL (ref 9.4–12.4)
PMV BLD AUTO: 10.7 FL (ref 9.4–12.4)
PMV BLD AUTO: 11.7 FL (ref 9.4–12.4)
PMV BLD AUTO: 11.7 FL (ref 9.4–12.4)
PMV BLD AUTO: 12 FL (ref 9.4–12.4)
PMV BLD AUTO: 12 FL (ref 9.4–12.4)
PMV BLD AUTO: 12.1 FL (ref 9.4–12.4)
PMV BLD AUTO: 12.1 FL (ref 9.4–12.4)
PMV BLD AUTO: 8.9 FL (ref 9.4–12.4)
PMV BLD AUTO: 9.1 FL (ref 8.1–13.5)
PMV BLD AUTO: 9.2 FL (ref 8.1–13.5)
PMV BLD AUTO: 9.3 FL (ref 8.1–13.5)
PMV BLD AUTO: 9.5 FL (ref 8.1–13.5)
PO2 MIXED: 27 MMHG (ref 25–40)
PO2 MIXED: 36 MMHG (ref 25–40)
PO2 MIXED: 36 MMHG (ref 25–40)
PO2: 106 MMHG (ref 71–104)
PO2: 109 MMHG (ref 71–104)
PO2: 116 MMHG (ref 71–104)
PO2: 126 MMHG (ref 71–104)
PO2: 141 MMHG (ref 71–104)
PO2: 144 MMHG (ref 71–104)
PO2: 154 MMHG (ref 71–104)
PO2: 302 MMHG (ref 71–104)
PO2: 355 MMHG (ref 71–104)
PO2: 69 MMHG (ref 71–104)
PO2: 77 MMHG (ref 71–104)
PO2: 85 MMHG (ref 71–104)
PO2: 85 MMHG (ref 71–104)
POC CREATININE WHOLE BLOOD: 1.3 MG/DL (ref 0.5–1.2)
POC CREATININE WHOLE BLOOD: 2.6 MG/DL (ref 0.5–1.2)
POC LACTIC ACID: 11.8 MMOL/L (ref 0.5–1.9)
POC LACTIC ACID: 2.8 MMOL/L (ref 0.5–1.9)
POC O2 SATURATION: 58 % (ref 94–97)
POC O2 SATURATION: 92 % (ref 94–97)
POTASSIUM REFLEX MAGNESIUM: 3.4 MEQ/L (ref 3.5–5.2)
POTASSIUM REFLEX MAGNESIUM: 3.7 MEQ/L (ref 3.5–5.2)
POTASSIUM REFLEX MAGNESIUM: 4 MEQ/L (ref 3.5–5.2)
POTASSIUM REFLEX MAGNESIUM: 4.7 MEQ/L (ref 3.5–5.2)
POTASSIUM SERPL-SCNC: 2.9 MEQ/L (ref 3.5–5.2)
POTASSIUM SERPL-SCNC: 3.6 MEQ/L (ref 3.5–5.2)
POTASSIUM SERPL-SCNC: 3.7 MEQ/L (ref 3.5–5.2)
POTASSIUM SERPL-SCNC: 3.8 MEQ/L (ref 3.5–5.2)
POTASSIUM SERPL-SCNC: 3.9 MEQ/L (ref 3.5–5.2)
POTASSIUM SERPL-SCNC: 4 MMOL/L (ref 3.7–5.3)
POTASSIUM SERPL-SCNC: 4.1 MEQ/L (ref 3.5–5.2)
POTASSIUM SERPL-SCNC: 4.1 MMOL/L (ref 3.7–5.3)
POTASSIUM SERPL-SCNC: 4.2 MEQ/L (ref 3.5–5.2)
POTASSIUM SERPL-SCNC: 4.3 MEQ/L (ref 3.5–5.2)
POTASSIUM SERPL-SCNC: 4.4 MEQ/L (ref 3.5–5.2)
POTASSIUM SERPL-SCNC: 4.4 MMOL/L (ref 3.7–5.3)
POTASSIUM SERPL-SCNC: 4.5 MEQ/L (ref 3.5–5.2)
POTASSIUM SERPL-SCNC: 4.5 MMOL/L (ref 3.7–5.3)
POTASSIUM SERPL-SCNC: 4.6 MEQ/L (ref 3.5–5.2)
POTASSIUM SERPL-SCNC: 4.6 MMOL/L (ref 3.7–5.3)
POTASSIUM SERPL-SCNC: 4.7 MEQ/L (ref 3.5–5.2)
POTASSIUM SERPL-SCNC: 5.2 MEQ/L (ref 3.5–5.2)
POTASSIUM, WHOLE BLOOD: 3.6 MEQ/L (ref 3.5–4.9)
POTASSIUM, WHOLE BLOOD: 4.3 MEQ/L (ref 3.5–4.9)
PRO-BNP: ABNORMAL PG/ML (ref 0–1800)
PROTEIN UA: NEGATIVE
PROTEIN UA: NEGATIVE MG/DL
PROTEUS SPECIES BY PCR: NOT DETECTED
PSEUDOMONAS AERUGINOSA BY PCR: NOT DETECTED
RBC # BLD: 2.47 MILL/MM3 (ref 4.2–5.4)
RBC # BLD: 2.56 MILL/MM3 (ref 4.2–5.4)
RBC # BLD: 2.98 MILL/MM3 (ref 4.2–5.4)
RBC # BLD: 3.07 MILL/MM3 (ref 4.2–5.4)
RBC # BLD: 3.19 MILL/MM3 (ref 4.2–5.4)
RBC # BLD: 3.29 MILL/MM3 (ref 4.2–5.4)
RBC # BLD: 3.29 MILL/MM3 (ref 4.2–5.4)
RBC # BLD: 3.38 MILL/MM3 (ref 4.2–5.4)
RBC # BLD: 3.41 MILL/MM3 (ref 4.2–5.4)
RBC # BLD: 3.63 MILL/MM3 (ref 4.2–5.4)
RBC # BLD: 3.73 M/UL (ref 3.95–5.11)
RBC # BLD: 3.81 M/UL (ref 3.95–5.11)
RBC # BLD: 3.83 M/UL (ref 3.95–5.11)
RBC # BLD: 3.95 M/UL (ref 3.95–5.11)
RBC # BLD: 3.95 MILL/MM3 (ref 4.2–5.4)
RBC # BLD: 4.33 MILL/MM3 (ref 4.2–5.4)
RBC # BLD: 4.33 MILL/MM3 (ref 4.2–5.4)
RBC # BLD: 4.7 M/UL (ref 3.95–5.11)
RBC # BLD: 4.83 MILL/MM3 (ref 4.2–5.4)
RBC # BLD: 4.92 MILL/MM3 (ref 4.2–5.4)
RBC UA: NORMAL /HPF (ref 0–2)
REASON FOR REJECTION: NORMAL
REJECTED TEST: NORMAL
RESISTANT GENE CTX-M BY PCR: NORMAL
RESISTANT GENE IMP BY PCR: NORMAL
RESISTANT GENE KPC BY PCR: NORMAL
RESISTANT GENE MECA/C & MREJ BY PCR: NORMAL
RESISTANT GENE NDM BY PCR: NORMAL
RESISTANT GENE OXA-48-LIKE BY PCR: NORMAL
RESISTANT GENE VIM BY PCR: NORMAL
RESPIRATORY CULTURE: NORMAL
RESPIRATORY SYNCYTIAL VIRUS BY PCR: NOT DETECTED
RH FACTOR: NORMAL
RHINOVIRUS ENTEROVIRUS PCR: NOT DETECTED
SCAN OF BLOOD SMEAR: NORMAL
SCAN OF BLOOD SMEAR: NORMAL
SEG NEUTROPHILS: 59 % (ref 36–65)
SEG NEUTROPHILS: 65 % (ref 36–65)
SEG NEUTROPHILS: 69 % (ref 36–65)
SEG NEUTROPHILS: 71.2 %
SEG NEUTROPHILS: 73 % (ref 36–65)
SEG NEUTROPHILS: 77.4 %
SEG NEUTROPHILS: 81.3 %
SEG NEUTROPHILS: 81.6 %
SEG NEUTROPHILS: 86.3 %
SEG NEUTROPHILS: 87.1 %
SEGMENTED NEUTROPHILS ABSOLUTE COUNT: 10.2 THOU/MM3 (ref 1.8–7.7)
SEGMENTED NEUTROPHILS ABSOLUTE COUNT: 15.2 THOU/MM3 (ref 1.8–7.7)
SEGMENTED NEUTROPHILS ABSOLUTE COUNT: 15.4 THOU/MM3 (ref 1.8–7.7)
SEGMENTED NEUTROPHILS ABSOLUTE COUNT: 3.75 K/UL (ref 1.5–8.1)
SEGMENTED NEUTROPHILS ABSOLUTE COUNT: 4.8 K/UL (ref 1.5–8.1)
SEGMENTED NEUTROPHILS ABSOLUTE COUNT: 4.91 K/UL (ref 1.5–8.1)
SEGMENTED NEUTROPHILS ABSOLUTE COUNT: 5.1 K/UL (ref 1.5–8.1)
SEGMENTED NEUTROPHILS ABSOLUTE COUNT: 5.9 THOU/MM3 (ref 1.8–7.7)
SEGMENTED NEUTROPHILS ABSOLUTE COUNT: 9 THOU/MM3 (ref 1.8–7.7)
SEGMENTED NEUTROPHILS ABSOLUTE COUNT: 9.2 THOU/MM3 (ref 1.8–7.7)
SERRATIA MARCESCENS BY PCR: NOT DETECTED
SET PEEP: 6 MMHG
SET PRESS SUPP: 14 CMH2O
SET RESPIRATORY RATE: 12 BPM
SET RESPIRATORY RATE: 16 BPM
SITE: NORMAL
SITE: NORMAL
SODIUM BLD-SCNC: 131 MMOL/L (ref 135–144)
SODIUM BLD-SCNC: 133 MMOL/L (ref 135–144)
SODIUM BLD-SCNC: 134 MMOL/L (ref 135–144)
SODIUM BLD-SCNC: 134 MMOL/L (ref 135–144)
SODIUM BLD-SCNC: 136 MMOL/L (ref 135–144)
SODIUM BLD-SCNC: 138 MEQ/L (ref 135–145)
SODIUM BLD-SCNC: 138 MEQ/L (ref 135–145)
SODIUM BLD-SCNC: 139 MEQ/L (ref 135–145)
SODIUM BLD-SCNC: 139 MEQ/L (ref 135–145)
SODIUM BLD-SCNC: 140 MEQ/L (ref 135–145)
SODIUM BLD-SCNC: 141 MEQ/L (ref 135–145)
SODIUM BLD-SCNC: 142 MEQ/L (ref 135–145)
SODIUM BLD-SCNC: 143 MEQ/L (ref 135–145)
SODIUM BLD-SCNC: 144 MEQ/L (ref 135–145)
SODIUM BLD-SCNC: 145 MEQ/L (ref 135–145)
SODIUM BLD-SCNC: 146 MEQ/L (ref 135–145)
SODIUM BLD-SCNC: 147 MEQ/L (ref 135–145)
SODIUM BLD-SCNC: 147 MEQ/L (ref 135–145)
SODIUM BLD-SCNC: 149 MEQ/L (ref 135–145)
SODIUM, WHOLE BLOOD: 138 MEQ/L (ref 138–146)
SODIUM, WHOLE BLOOD: 146 MEQ/L (ref 138–146)
SOURCE, BLOOD GAS: ABNORMAL
SOURCE: NORMAL
SPECIFIC GRAVITY UA: 1.01 (ref 1–1.03)
SPECIFIC GRAVITY UA: <1.005 (ref 1.01–1.02)
SPECIMEN ACCEPTABILITY: NORMAL
SPO2: NORMAL %
STAPH AUREUS BY PCR: NOT DETECTED
STREP AGALACTIAE BY PCR: NOT DETECTED
STREP PNEUMONIAE BY PCR: NOT DETECTED
STREP PYOGENES BY PCR: NOT DETECTED
THYROXINE, FREE: 1.73 NG/DL (ref 0.93–1.7)
TOTAL PROTEIN: 4.5 G/DL (ref 6.1–8)
TOTAL PROTEIN: 4.9 G/DL (ref 6.1–8)
TOTAL PROTEIN: 5.1 G/DL (ref 6.1–8)
TOTAL PROTEIN: 5.2 G/DL (ref 6.1–8)
TOTAL PROTEIN: 5.4 G/DL (ref 6.1–8)
TOTAL PROTEIN: 5.4 G/DL (ref 6.1–8)
TOTAL PROTEIN: 6 G/DL (ref 6.1–8)
TOTAL PROTEIN: 6.6 G/DL (ref 6.4–8.3)
TOTAL PROTEIN: 6.9 G/DL (ref 6.4–8.3)
TOTAL PROTEIN: 7.2 G/DL (ref 6.1–8)
TRIGL SERPL-MCNC: 101 MG/DL
TRIGL SERPL-MCNC: 83 MG/DL
TRIGL SERPL-MCNC: 89 MG/DL (ref 0–199)
TROPONIN T: 0.82 NG/ML
TROPONIN, HIGH SENSITIVITY: 19 NG/L (ref 0–14)
TROPONIN, HIGH SENSITIVITY: 19 NG/L (ref 0–14)
TROPONIN, HIGH SENSITIVITY: 21 NG/L (ref 0–14)
TROPONIN, HIGH SENSITIVITY: 23 NG/L (ref 0–14)
TSH SERPL DL<=0.05 MIU/L-ACNC: 0.59 UIU/ML (ref 0.3–5)
TURBIDITY: CLEAR
URINE CULTURE, ROUTINE: ABNORMAL
URINE CULTURE, ROUTINE: NORMAL
URINE HGB: NEGATIVE
UROBILINOGEN, URINE: 0.2 EU/DL (ref 0–1)
UROBILINOGEN, URINE: NORMAL
WBC # BLD: 10.3 THOU/MM3 (ref 4.8–10.8)
WBC # BLD: 10.7 THOU/MM3 (ref 4.8–10.8)
WBC # BLD: 11.5 THOU/MM3 (ref 4.8–10.8)
WBC # BLD: 12.2 THOU/MM3 (ref 4.8–10.8)
WBC # BLD: 12.5 THOU/MM3 (ref 4.8–10.8)
WBC # BLD: 12.8 THOU/MM3 (ref 4.8–10.8)
WBC # BLD: 13.3 THOU/MM3 (ref 4.8–10.8)
WBC # BLD: 19.9 THOU/MM3 (ref 4.8–10.8)
WBC # BLD: 21.4 THOU/MM3 (ref 4.8–10.8)
WBC # BLD: 25.6 THOU/MM3 (ref 4.8–10.8)
WBC # BLD: 5.2 THOU/MM3 (ref 4.8–10.8)
WBC # BLD: 5.2 THOU/MM3 (ref 4.8–10.8)
WBC # BLD: 6.3 K/UL (ref 3.5–11.3)
WBC # BLD: 6.3 THOU/MM3 (ref 4.8–10.8)
WBC # BLD: 7 K/UL (ref 3.5–11.3)
WBC # BLD: 7.1 K/UL (ref 3.5–11.3)
WBC # BLD: 7.1 K/UL (ref 3.5–11.3)
WBC # BLD: 7.2 THOU/MM3 (ref 4.8–10.8)
WBC # BLD: 7.3 K/UL (ref 3.5–11.3)
WBC # BLD: 7.5 THOU/MM3 (ref 4.8–10.8)
WBC UA: NORMAL /HPF (ref 0–5)

## 2022-01-01 PROCEDURE — 85014 HEMATOCRIT: CPT

## 2022-01-01 PROCEDURE — 2700000000 HC OXYGEN THERAPY PER DAY

## 2022-01-01 PROCEDURE — 2580000003 HC RX 258: Performed by: INTERNAL MEDICINE

## 2022-01-01 PROCEDURE — 82248 BILIRUBIN DIRECT: CPT

## 2022-01-01 PROCEDURE — 83880 ASSAY OF NATRIURETIC PEPTIDE: CPT

## 2022-01-01 PROCEDURE — 85018 HEMOGLOBIN: CPT

## 2022-01-01 PROCEDURE — 36556 INSERT NON-TUNNEL CV CATH: CPT

## 2022-01-01 PROCEDURE — 80053 COMPREHEN METABOLIC PANEL: CPT

## 2022-01-01 PROCEDURE — 99292 CRITICAL CARE ADDL 30 MIN: CPT | Performed by: INTERNAL MEDICINE

## 2022-01-01 PROCEDURE — 80048 BASIC METABOLIC PNL TOTAL CA: CPT

## 2022-01-01 PROCEDURE — P9047 ALBUMIN (HUMAN), 25%, 50ML: HCPCS | Performed by: INTERNAL MEDICINE

## 2022-01-01 PROCEDURE — 92611 MOTION FLUOROSCOPY/SWALLOW: CPT

## 2022-01-01 PROCEDURE — 1036F TOBACCO NON-USER: CPT | Performed by: FAMILY MEDICINE

## 2022-01-01 PROCEDURE — 1036F TOBACCO NON-USER: CPT | Performed by: PHYSICIAN ASSISTANT

## 2022-01-01 PROCEDURE — 93456 R HRT CORONARY ARTERY ANGIO: CPT | Performed by: INTERNAL MEDICINE

## 2022-01-01 PROCEDURE — 87486 CHLMYD PNEUM DNA AMP PROBE: CPT

## 2022-01-01 PROCEDURE — 99220 PR INITIAL OBSERVATION CARE/DAY 70 MINUTES: CPT | Performed by: FAMILY MEDICINE

## 2022-01-01 PROCEDURE — 93010 ELECTROCARDIOGRAM REPORT: CPT | Performed by: INTERNAL MEDICINE

## 2022-01-01 PROCEDURE — G8400 PT W/DXA NO RESULTS DOC: HCPCS | Performed by: PHYSICIAN ASSISTANT

## 2022-01-01 PROCEDURE — 37799 UNLISTED PX VASCULAR SURGERY: CPT

## 2022-01-01 PROCEDURE — 36415 COLL VENOUS BLD VENIPUNCTURE: CPT

## 2022-01-01 PROCEDURE — 97163 PT EVAL HIGH COMPLEX 45 MIN: CPT

## 2022-01-01 PROCEDURE — 2580000003 HC RX 258: Performed by: NURSE PRACTITIONER

## 2022-01-01 PROCEDURE — 6370000000 HC RX 637 (ALT 250 FOR IP)

## 2022-01-01 PROCEDURE — 82803 BLOOD GASES ANY COMBINATION: CPT

## 2022-01-01 PROCEDURE — 2780000010 HC IMPLANT OTHER

## 2022-01-01 PROCEDURE — 93005 ELECTROCARDIOGRAM TRACING: CPT | Performed by: PHYSICIAN ASSISTANT

## 2022-01-01 PROCEDURE — 6360000002 HC RX W HCPCS

## 2022-01-01 PROCEDURE — 83036 HEMOGLOBIN GLYCOSYLATED A1C: CPT

## 2022-01-01 PROCEDURE — 6360000002 HC RX W HCPCS: Performed by: INTERNAL MEDICINE

## 2022-01-01 PROCEDURE — 95720 EEG PHY/QHP EA INCR W/VEEG: CPT | Performed by: PSYCHIATRY & NEUROLOGY

## 2022-01-01 PROCEDURE — 99291 CRITICAL CARE FIRST HOUR: CPT | Performed by: INTERNAL MEDICINE

## 2022-01-01 PROCEDURE — 6370000000 HC RX 637 (ALT 250 FOR IP): Performed by: INTERNAL MEDICINE

## 2022-01-01 PROCEDURE — 80061 LIPID PANEL: CPT

## 2022-01-01 PROCEDURE — 82947 ASSAY GLUCOSE BLOOD QUANT: CPT

## 2022-01-01 PROCEDURE — 83605 ASSAY OF LACTIC ACID: CPT

## 2022-01-01 PROCEDURE — 83735 ASSAY OF MAGNESIUM: CPT

## 2022-01-01 PROCEDURE — 99214 OFFICE O/P EST MOD 30 MIN: CPT | Performed by: NURSE PRACTITIONER

## 2022-01-01 PROCEDURE — 6360000002 HC RX W HCPCS: Performed by: NURSE ANESTHETIST, CERTIFIED REGISTERED

## 2022-01-01 PROCEDURE — 71045 X-RAY EXAM CHEST 1 VIEW: CPT

## 2022-01-01 PROCEDURE — 2500000003 HC RX 250 WO HCPCS

## 2022-01-01 PROCEDURE — 99211 OFF/OP EST MAY X REQ PHY/QHP: CPT

## 2022-01-01 PROCEDURE — 82010 KETONE BODYS QUAN: CPT

## 2022-01-01 PROCEDURE — 93306 TTE W/DOPPLER COMPLETE: CPT

## 2022-01-01 PROCEDURE — 6370000000 HC RX 637 (ALT 250 FOR IP): Performed by: NURSE PRACTITIONER

## 2022-01-01 PROCEDURE — 36430 TRANSFUSION BLD/BLD COMPNT: CPT

## 2022-01-01 PROCEDURE — 82330 ASSAY OF CALCIUM: CPT

## 2022-01-01 PROCEDURE — C1769 GUIDE WIRE: HCPCS

## 2022-01-01 PROCEDURE — 2500000003 HC RX 250 WO HCPCS: Performed by: INTERNAL MEDICINE

## 2022-01-01 PROCEDURE — 1111F DSCHRG MED/CURRENT MED MERGE: CPT | Performed by: FAMILY MEDICINE

## 2022-01-01 PROCEDURE — 93798 PHYS/QHP OP CAR RHAB W/ECG: CPT

## 2022-01-01 PROCEDURE — 2580000003 HC RX 258: Performed by: STUDENT IN AN ORGANIZED HEALTH CARE EDUCATION/TRAINING PROGRAM

## 2022-01-01 PROCEDURE — 2720000010 HC SURG SUPPLY STERILE

## 2022-01-01 PROCEDURE — 93005 ELECTROCARDIOGRAM TRACING: CPT | Performed by: STUDENT IN AN ORGANIZED HEALTH CARE EDUCATION/TRAINING PROGRAM

## 2022-01-01 PROCEDURE — C9600 PERC DRUG-EL COR STENT SING: HCPCS

## 2022-01-01 PROCEDURE — 5A2204Z RESTORATION OF CARDIAC RHYTHM, SINGLE: ICD-10-PCS | Performed by: INTERNAL MEDICINE

## 2022-01-01 PROCEDURE — 2500000003 HC RX 250 WO HCPCS: Performed by: PHYSICIAN ASSISTANT

## 2022-01-01 PROCEDURE — 4A03X5D MEASUREMENT OF ARTERIAL FLOW, INTRACRANIAL, EXTERNAL APPROACH: ICD-10-PCS | Performed by: RADIOLOGY

## 2022-01-01 PROCEDURE — 1123F ACP DISCUSS/DSCN MKR DOCD: CPT | Performed by: FAMILY MEDICINE

## 2022-01-01 PROCEDURE — 86901 BLOOD TYPING SEROLOGIC RH(D): CPT

## 2022-01-01 PROCEDURE — G8420 CALC BMI NORM PARAMETERS: HCPCS | Performed by: FAMILY MEDICINE

## 2022-01-01 PROCEDURE — 02HV33Z INSERTION OF INFUSION DEVICE INTO SUPERIOR VENA CAVA, PERCUTANEOUS APPROACH: ICD-10-PCS | Performed by: INTERNAL MEDICINE

## 2022-01-01 PROCEDURE — 93456 R HRT CORONARY ARTERY ANGIO: CPT

## 2022-01-01 PROCEDURE — 84100 ASSAY OF PHOSPHORUS: CPT

## 2022-01-01 PROCEDURE — 94726 PLETHYSMOGRAPHY LUNG VOLUMES: CPT

## 2022-01-01 PROCEDURE — G8427 DOCREV CUR MEDS BY ELIG CLIN: HCPCS | Performed by: PHYSICIAN ASSISTANT

## 2022-01-01 PROCEDURE — 93458 L HRT ARTERY/VENTRICLE ANGIO: CPT | Performed by: FAMILY MEDICINE

## 2022-01-01 PROCEDURE — 81001 URINALYSIS AUTO W/SCOPE: CPT

## 2022-01-01 PROCEDURE — 89220 SPUTUM SPECIMEN COLLECTION: CPT

## 2022-01-01 PROCEDURE — 93312 ECHO TRANSESOPHAGEAL: CPT | Performed by: INTERNAL MEDICINE

## 2022-01-01 PROCEDURE — G0378 HOSPITAL OBSERVATION PER HR: HCPCS

## 2022-01-01 PROCEDURE — 4A00X4Z MEASUREMENT OF CENTRAL NERVOUS ELECTRICAL ACTIVITY, EXTERNAL APPROACH: ICD-10-PCS | Performed by: PSYCHIATRY & NEUROLOGY

## 2022-01-01 PROCEDURE — 85025 COMPLETE CBC W/AUTO DIFF WBC: CPT

## 2022-01-01 PROCEDURE — 84484 ASSAY OF TROPONIN QUANT: CPT

## 2022-01-01 PROCEDURE — C9113 INJ PANTOPRAZOLE SODIUM, VIA: HCPCS | Performed by: INTERNAL MEDICINE

## 2022-01-01 PROCEDURE — 99214 OFFICE O/P EST MOD 30 MIN: CPT | Performed by: PHYSICIAN ASSISTANT

## 2022-01-01 PROCEDURE — 86850 RBC ANTIBODY SCREEN: CPT

## 2022-01-01 PROCEDURE — C1887 CATHETER, GUIDING: HCPCS

## 2022-01-01 PROCEDURE — 33967 INSERT I-AORT PERCUT DEVICE: CPT | Performed by: INTERNAL MEDICINE

## 2022-01-01 PROCEDURE — 2580000003 HC RX 258: Performed by: PHYSICIAN ASSISTANT

## 2022-01-01 PROCEDURE — C1725 CATH, TRANSLUMIN NON-LASER: HCPCS

## 2022-01-01 PROCEDURE — 94060 EVALUATION OF WHEEZING: CPT

## 2022-01-01 PROCEDURE — 87086 URINE CULTURE/COLONY COUNT: CPT

## 2022-01-01 PROCEDURE — 93320 DOPPLER ECHO COMPLETE: CPT

## 2022-01-01 PROCEDURE — P9016 RBC LEUKOCYTES REDUCED: HCPCS

## 2022-01-01 PROCEDURE — 2709999900 HC NON-CHARGEABLE SUPPLY

## 2022-01-01 PROCEDURE — 1090F PRES/ABSN URINE INCON ASSESS: CPT | Performed by: PHYSICIAN ASSISTANT

## 2022-01-01 PROCEDURE — 3700000000 HC ANESTHESIA ATTENDED CARE

## 2022-01-01 PROCEDURE — 87581 M.PNEUMON DNA AMP PROBE: CPT

## 2022-01-01 PROCEDURE — 85347 COAGULATION TIME ACTIVATED: CPT

## 2022-01-01 PROCEDURE — 6360000004 HC RX CONTRAST MEDICATION: Performed by: FAMILY MEDICINE

## 2022-01-01 PROCEDURE — 2500000003 HC RX 250 WO HCPCS: Performed by: NURSE PRACTITIONER

## 2022-01-01 PROCEDURE — 92960 CARDIOVERSION ELECTRIC EXT: CPT | Performed by: INTERNAL MEDICINE

## 2022-01-01 PROCEDURE — 93005 ELECTROCARDIOGRAM TRACING: CPT | Performed by: INTERNAL MEDICINE

## 2022-01-01 PROCEDURE — 85520 HEPARIN ASSAY: CPT

## 2022-01-01 PROCEDURE — 2500000003 HC RX 250 WO HCPCS: Performed by: FAMILY MEDICINE

## 2022-01-01 PROCEDURE — 97110 THERAPEUTIC EXERCISES: CPT

## 2022-01-01 PROCEDURE — 86900 BLOOD TYPING SEROLOGIC ABO: CPT

## 2022-01-01 PROCEDURE — 94664 DEMO&/EVAL PT USE INHALER: CPT

## 2022-01-01 PROCEDURE — 70496 CT ANGIOGRAPHY HEAD: CPT

## 2022-01-01 PROCEDURE — 82948 REAGENT STRIP/BLOOD GLUCOSE: CPT

## 2022-01-01 PROCEDURE — 87798 DETECT AGENT NOS DNA AMP: CPT

## 2022-01-01 PROCEDURE — 1090F PRES/ABSN URINE INCON ASSESS: CPT | Performed by: FAMILY MEDICINE

## 2022-01-01 PROCEDURE — 95708 EEG WO VID EA 12-26HR UNMNTR: CPT

## 2022-01-01 PROCEDURE — 33968 REMOVE AORTIC ASSIST DEVICE: CPT | Performed by: INTERNAL MEDICINE

## 2022-01-01 PROCEDURE — 85610 PROTHROMBIN TIME: CPT

## 2022-01-01 PROCEDURE — P9017 PLASMA 1 DONOR FRZ W/IN 8 HR: HCPCS

## 2022-01-01 PROCEDURE — 1123F ACP DISCUSS/DSCN MKR DOCD: CPT | Performed by: PHYSICIAN ASSISTANT

## 2022-01-01 PROCEDURE — 92950 HEART/LUNG RESUSCITATION CPR: CPT

## 2022-01-01 PROCEDURE — 94761 N-INVAS EAR/PLS OXIMETRY MLT: CPT

## 2022-01-01 PROCEDURE — P9047 ALBUMIN (HUMAN), 25%, 50ML: HCPCS

## 2022-01-01 PROCEDURE — 36556 INSERT NON-TUNNEL CV CATH: CPT | Performed by: NURSE PRACTITIONER

## 2022-01-01 PROCEDURE — 6360000002 HC RX W HCPCS: Performed by: NURSE PRACTITIONER

## 2022-01-01 PROCEDURE — 93458 L HRT ARTERY/VENTRICLE ANGIO: CPT

## 2022-01-01 PROCEDURE — 2000000000 HC ICU R&B

## 2022-01-01 PROCEDURE — 84132 ASSAY OF SERUM POTASSIUM: CPT

## 2022-01-01 PROCEDURE — 93005 ELECTROCARDIOGRAM TRACING: CPT | Performed by: NURSE PRACTITIONER

## 2022-01-01 PROCEDURE — C1894 INTRO/SHEATH, NON-LASER: HCPCS

## 2022-01-01 PROCEDURE — 2100000000 HC CCU R&B

## 2022-01-01 PROCEDURE — 93325 DOPPLER ECHO COLOR FLOW MAPG: CPT

## 2022-01-01 PROCEDURE — 36620 INSERTION CATHETER ARTERY: CPT | Performed by: INTERNAL MEDICINE

## 2022-01-01 PROCEDURE — 51702 INSERT TEMP BLADDER CATH: CPT

## 2022-01-01 PROCEDURE — B2111ZZ FLUOROSCOPY OF MULTIPLE CORONARY ARTERIES USING LOW OSMOLAR CONTRAST: ICD-10-PCS | Performed by: INTERNAL MEDICINE

## 2022-01-01 PROCEDURE — 87541 LEGION PNEUMO DNA AMP PROB: CPT

## 2022-01-01 PROCEDURE — 71046 X-RAY EXAM CHEST 2 VIEWS: CPT

## 2022-01-01 PROCEDURE — 81003 URINALYSIS AUTO W/O SCOPE: CPT

## 2022-01-01 PROCEDURE — 92928 PRQ TCAT PLMT NTRAC ST 1 LES: CPT | Performed by: INTERNAL MEDICINE

## 2022-01-01 PROCEDURE — 99214 OFFICE O/P EST MOD 30 MIN: CPT | Performed by: FAMILY MEDICINE

## 2022-01-01 PROCEDURE — 6370000000 HC RX 637 (ALT 250 FOR IP): Performed by: FAMILY MEDICINE

## 2022-01-01 PROCEDURE — G8400 PT W/DXA NO RESULTS DOC: HCPCS | Performed by: FAMILY MEDICINE

## 2022-01-01 PROCEDURE — 85027 COMPLETE CBC AUTOMATED: CPT

## 2022-01-01 PROCEDURE — 6360000002 HC RX W HCPCS: Performed by: FAMILY MEDICINE

## 2022-01-01 PROCEDURE — 36556 INSERT NON-TUNNEL CV CATH: CPT | Performed by: INTERNAL MEDICINE

## 2022-01-01 PROCEDURE — 99239 HOSP IP/OBS DSCHRG MGMT >30: CPT | Performed by: NURSE PRACTITIONER

## 2022-01-01 PROCEDURE — G8420 CALC BMI NORM PARAMETERS: HCPCS | Performed by: PHYSICIAN ASSISTANT

## 2022-01-01 PROCEDURE — 1200000003 HC TELEMETRY R&B

## 2022-01-01 PROCEDURE — 95719 EEG PHYS/QHP EA INCR W/O VID: CPT | Performed by: PSYCHIATRY & NEUROLOGY

## 2022-01-01 PROCEDURE — 97166 OT EVAL MOD COMPLEX 45 MIN: CPT

## 2022-01-01 PROCEDURE — 0BH17EZ INSERTION OF ENDOTRACHEAL AIRWAY INTO TRACHEA, VIA NATURAL OR ARTIFICIAL OPENING: ICD-10-PCS | Performed by: INTERNAL MEDICINE

## 2022-01-01 PROCEDURE — 99285 EMERGENCY DEPT VISIT HI MDM: CPT

## 2022-01-01 PROCEDURE — 92526 ORAL FUNCTION THERAPY: CPT

## 2022-01-01 PROCEDURE — 32551 INSERTION OF CHEST TUBE: CPT

## 2022-01-01 PROCEDURE — 31500 INSERT EMERGENCY AIRWAY: CPT

## 2022-01-01 PROCEDURE — 93005 ELECTROCARDIOGRAM TRACING: CPT | Performed by: FAMILY MEDICINE

## 2022-01-01 PROCEDURE — 99204 OFFICE O/P NEW MOD 45 MIN: CPT | Performed by: PHYSICIAN ASSISTANT

## 2022-01-01 PROCEDURE — 93970 EXTREMITY STUDY: CPT

## 2022-01-01 PROCEDURE — 2580000003 HC RX 258

## 2022-01-01 PROCEDURE — 96372 THER/PROPH/DIAG INJ SC/IM: CPT

## 2022-01-01 PROCEDURE — 94618 PULMONARY STRESS TESTING: CPT

## 2022-01-01 PROCEDURE — 74230 X-RAY XM SWLNG FUNCJ C+: CPT

## 2022-01-01 PROCEDURE — 71275 CT ANGIOGRAPHY CHEST: CPT

## 2022-01-01 PROCEDURE — C1874 STENT, COATED/COV W/DEL SYS: HCPCS

## 2022-01-01 PROCEDURE — 02H633Z INSERTION OF INFUSION DEVICE INTO RIGHT ATRIUM, PERCUTANEOUS APPROACH: ICD-10-PCS | Performed by: INTERNAL MEDICINE

## 2022-01-01 PROCEDURE — 96360 HYDRATION IV INFUSION INIT: CPT

## 2022-01-01 PROCEDURE — 87205 SMEAR GRAM STAIN: CPT

## 2022-01-01 PROCEDURE — 33418 REPAIR TCAT MITRAL VALVE: CPT

## 2022-01-01 PROCEDURE — 82533 TOTAL CORTISOL: CPT

## 2022-01-01 PROCEDURE — 6360000002 HC RX W HCPCS: Performed by: PHYSICIAN ASSISTANT

## 2022-01-01 PROCEDURE — 93307 TTE W/O DOPPLER COMPLETE: CPT

## 2022-01-01 PROCEDURE — 87631 RESP VIRUS 3-5 TARGETS: CPT

## 2022-01-01 PROCEDURE — 2580000003 HC RX 258: Performed by: NURSE ANESTHETIST, CERTIFIED REGISTERED

## 2022-01-01 PROCEDURE — 82565 ASSAY OF CREATININE: CPT

## 2022-01-01 PROCEDURE — 1123F ACP DISCUSS/DSCN MKR DOCD: CPT | Performed by: NURSE PRACTITIONER

## 2022-01-01 PROCEDURE — 96361 HYDRATE IV INFUSION ADD-ON: CPT

## 2022-01-01 PROCEDURE — P9047 ALBUMIN (HUMAN), 25%, 50ML: HCPCS | Performed by: NURSE PRACTITIONER

## 2022-01-01 PROCEDURE — G8427 DOCREV CUR MEDS BY ELIG CLIN: HCPCS | Performed by: FAMILY MEDICINE

## 2022-01-01 PROCEDURE — 70450 CT HEAD/BRAIN W/O DYE: CPT

## 2022-01-01 PROCEDURE — 84439 ASSAY OF FREE THYROXINE: CPT

## 2022-01-01 PROCEDURE — 33967 INSERT I-AORT PERCUT DEVICE: CPT

## 2022-01-01 PROCEDURE — 6360000004 HC RX CONTRAST MEDICATION: Performed by: INTERNAL MEDICINE

## 2022-01-01 PROCEDURE — 92610 EVALUATE SWALLOWING FUNCTION: CPT

## 2022-01-01 PROCEDURE — 02UG3JZ SUPPLEMENT MITRAL VALVE WITH SYNTHETIC SUBSTITUTE, PERCUTANEOUS APPROACH: ICD-10-PCS | Performed by: INTERNAL MEDICINE

## 2022-01-01 PROCEDURE — 4A023N6 MEASUREMENT OF CARDIAC SAMPLING AND PRESSURE, RIGHT HEART, PERCUTANEOUS APPROACH: ICD-10-PCS | Performed by: INTERNAL MEDICINE

## 2022-01-01 PROCEDURE — 86923 COMPATIBILITY TEST ELECTRIC: CPT

## 2022-01-01 PROCEDURE — 93355 ECHO TRANSESOPHAGEAL (TEE): CPT

## 2022-01-01 PROCEDURE — 82435 ASSAY OF BLOOD CHLORIDE: CPT

## 2022-01-01 PROCEDURE — 84443 ASSAY THYROID STIM HORMONE: CPT

## 2022-01-01 PROCEDURE — 94003 VENT MGMT INPAT SUBQ DAY: CPT

## 2022-01-01 PROCEDURE — 5A1945Z RESPIRATORY VENTILATION, 24-96 CONSECUTIVE HOURS: ICD-10-PCS | Performed by: INTERNAL MEDICINE

## 2022-01-01 PROCEDURE — 33017 PRCRD DRG 6YR+ W/O CGEN CAR: CPT

## 2022-01-01 PROCEDURE — B24BZZ4 ULTRASONOGRAPHY OF HEART WITH AORTA, TRANSESOPHAGEAL: ICD-10-PCS | Performed by: INTERNAL MEDICINE

## 2022-01-01 PROCEDURE — 85730 THROMBOPLASTIN TIME PARTIAL: CPT

## 2022-01-01 PROCEDURE — 84295 ASSAY OF SERUM SODIUM: CPT

## 2022-01-01 PROCEDURE — 6360000004 HC RX CONTRAST MEDICATION: Performed by: NURSE PRACTITIONER

## 2022-01-01 PROCEDURE — 7100000010 HC PHASE II RECOVERY - FIRST 15 MIN: Performed by: INTERNAL MEDICINE

## 2022-01-01 PROCEDURE — 2709999900 HC NON-CHARGEABLE SUPPLY: Performed by: INTERNAL MEDICINE

## 2022-01-01 PROCEDURE — C1893 INTRO/SHEATH, FIXED,NON-PEEL: HCPCS

## 2022-01-01 PROCEDURE — P9037 PLATE PHERES LEUKOREDU IRRAD: HCPCS

## 2022-01-01 PROCEDURE — 99211 OFF/OP EST MAY X REQ PHY/QHP: CPT | Performed by: FAMILY MEDICINE

## 2022-01-01 PROCEDURE — 87070 CULTURE OTHR SPECIMN AEROBIC: CPT

## 2022-01-01 PROCEDURE — 36620 INSERTION CATHETER ARTERY: CPT

## 2022-01-01 PROCEDURE — 82810 BLOOD GASES O2 SAT ONLY: CPT

## 2022-01-01 PROCEDURE — 33017 PRCRD DRG 6YR+ W/O CGEN CAR: CPT | Performed by: INTERNAL MEDICINE

## 2022-01-01 PROCEDURE — 2580000003 HC RX 258: Performed by: FAMILY MEDICINE

## 2022-01-01 PROCEDURE — 5A02210 ASSISTANCE WITH CARDIAC OUTPUT USING BALLOON PUMP, CONTINUOUS: ICD-10-PCS | Performed by: INTERNAL MEDICINE

## 2022-01-01 PROCEDURE — 76705 ECHO EXAM OF ABDOMEN: CPT

## 2022-01-01 PROCEDURE — 97530 THERAPEUTIC ACTIVITIES: CPT

## 2022-01-01 PROCEDURE — 93312 ECHO TRANSESOPHAGEAL: CPT

## 2022-01-01 PROCEDURE — 2500000003 HC RX 250 WO HCPCS: Performed by: NURSE ANESTHETIST, CERTIFIED REGISTERED

## 2022-01-01 PROCEDURE — 3700000001 HC ADD 15 MINUTES (ANESTHESIA)

## 2022-01-01 PROCEDURE — 87077 CULTURE AEROBIC IDENTIFY: CPT

## 2022-01-01 PROCEDURE — 99215 OFFICE O/P EST HI 40 MIN: CPT | Performed by: INTERNAL MEDICINE

## 2022-01-01 PROCEDURE — 94729 DIFFUSING CAPACITY: CPT

## 2022-01-01 PROCEDURE — 94002 VENT MGMT INPAT INIT DAY: CPT

## 2022-01-01 PROCEDURE — 97161 PT EVAL LOW COMPLEX 20 MIN: CPT

## 2022-01-01 PROCEDURE — 027136Z DILATION OF CORONARY ARTERY, TWO ARTERIES WITH THREE DRUG-ELUTING INTRALUMINAL DEVICES, PERCUTANEOUS APPROACH: ICD-10-PCS | Performed by: INTERNAL MEDICINE

## 2022-01-01 PROCEDURE — 6360000002 HC RX W HCPCS: Performed by: STUDENT IN AN ORGANIZED HEALTH CARE EDUCATION/TRAINING PROGRAM

## 2022-01-01 PROCEDURE — 85576 BLOOD PLATELET AGGREGATION: CPT

## 2022-01-01 PROCEDURE — 70498 CT ANGIOGRAPHY NECK: CPT

## 2022-01-01 PROCEDURE — C1760 CLOSURE DEV, VASC: HCPCS

## 2022-01-01 PROCEDURE — C9601 PERC DRUG-EL COR STENT BRAN: HCPCS

## 2022-01-01 PROCEDURE — 33418 REPAIR TCAT MITRAL VALVE: CPT | Performed by: INTERNAL MEDICINE

## 2022-01-01 PROCEDURE — 99024 POSTOP FOLLOW-UP VISIT: CPT | Performed by: INTERNAL MEDICINE

## 2022-01-01 PROCEDURE — 33968 REMOVE AORTIC ASSIST DEVICE: CPT

## 2022-01-01 PROCEDURE — 70551 MRI BRAIN STEM W/O DYE: CPT

## 2022-01-01 RX ORDER — FENTANYL CITRATE-0.9 % NACL/PF 10 MCG/ML
25-200 PLASTIC BAG, INJECTION (ML) INTRAVENOUS CONTINUOUS
Status: DISCONTINUED | OUTPATIENT
Start: 2022-01-01 | End: 2022-01-01

## 2022-01-01 RX ORDER — METOPROLOL SUCCINATE 25 MG/1
25 TABLET, EXTENDED RELEASE ORAL DAILY
Status: DISCONTINUED | OUTPATIENT
Start: 2022-01-01 | End: 2022-01-01 | Stop reason: HOSPADM

## 2022-01-01 RX ORDER — ALBUMIN (HUMAN) 12.5 G/50ML
25 SOLUTION INTRAVENOUS EVERY 6 HOURS
Status: COMPLETED | OUTPATIENT
Start: 2022-01-01 | End: 2022-01-01

## 2022-01-01 RX ORDER — LIDOCAINE HYDROCHLORIDE 20 MG/ML
INJECTION, SOLUTION INTRAVENOUS
Status: COMPLETED | OUTPATIENT
Start: 2022-01-01 | End: 2022-01-01

## 2022-01-01 RX ORDER — LORAZEPAM 2 MG/ML
1 INJECTION INTRAMUSCULAR EVERY 6 HOURS PRN
Status: DISCONTINUED | OUTPATIENT
Start: 2022-01-01 | End: 2022-01-01

## 2022-01-01 RX ORDER — SODIUM CHLORIDE 9 MG/ML
INJECTION, SOLUTION INTRAVENOUS PRN
Status: DISCONTINUED | OUTPATIENT
Start: 2022-01-01 | End: 2022-01-01 | Stop reason: HOSPADM

## 2022-01-01 RX ORDER — HEPARIN SODIUM 10000 [USP'U]/100ML
5-30 INJECTION, SOLUTION INTRAVENOUS CONTINUOUS
Status: DISCONTINUED | OUTPATIENT
Start: 2022-01-01 | End: 2022-01-01 | Stop reason: HOSPADM

## 2022-01-01 RX ORDER — TRIAMTERENE AND HYDROCHLOROTHIAZIDE 37.5; 25 MG/1; MG/1
1 CAPSULE ORAL DAILY PRN
Status: ON HOLD | COMMUNITY
End: 2022-01-01 | Stop reason: HOSPADM

## 2022-01-01 RX ORDER — LOSARTAN POTASSIUM 25 MG/1
25 TABLET ORAL DAILY
Status: DISCONTINUED | OUTPATIENT
Start: 2022-01-01 | End: 2022-01-01 | Stop reason: HOSPADM

## 2022-01-01 RX ORDER — METOPROLOL SUCCINATE 25 MG/1
25 TABLET, EXTENDED RELEASE ORAL DAILY
Qty: 30 TABLET | Refills: 3 | Status: SHIPPED | OUTPATIENT
Start: 2022-01-01 | End: 2022-01-01 | Stop reason: SDUPTHER

## 2022-01-01 RX ORDER — ATORVASTATIN CALCIUM 10 MG/1
10 TABLET, FILM COATED ORAL DAILY
Status: DISCONTINUED | OUTPATIENT
Start: 2022-01-01 | End: 2022-01-01 | Stop reason: HOSPADM

## 2022-01-01 RX ORDER — POLYETHYLENE GLYCOL 3350 17 G/17G
17 POWDER, FOR SOLUTION ORAL DAILY PRN
Status: DISCONTINUED | OUTPATIENT
Start: 2022-01-01 | End: 2022-01-01 | Stop reason: HOSPADM

## 2022-01-01 RX ORDER — LEVOTHYROXINE SODIUM 0.1 MG/1
100 TABLET ORAL DAILY
Status: DISCONTINUED | OUTPATIENT
Start: 2022-01-01 | End: 2022-01-01 | Stop reason: HOSPADM

## 2022-01-01 RX ORDER — CALCIUM GLUCONATE 10 MG/ML
1000 INJECTION, SOLUTION INTRAVENOUS ONCE
Status: COMPLETED | OUTPATIENT
Start: 2022-01-01 | End: 2022-01-01

## 2022-01-01 RX ORDER — ALBUTEROL SULFATE 90 UG/1
4 AEROSOL, METERED RESPIRATORY (INHALATION) ONCE
Status: COMPLETED | OUTPATIENT
Start: 2022-01-01 | End: 2022-01-01

## 2022-01-01 RX ORDER — NITROGLYCERIN 0.4 MG/1
0.4 TABLET SUBLINGUAL EVERY 5 MIN PRN
Status: DISCONTINUED | OUTPATIENT
Start: 2022-01-01 | End: 2022-01-01 | Stop reason: HOSPADM

## 2022-01-01 RX ORDER — VASOPRESSIN 20 U/ML
INJECTION PARENTERAL PRN
Status: DISCONTINUED | OUTPATIENT
Start: 2022-01-01 | End: 2022-01-01 | Stop reason: SDUPTHER

## 2022-01-01 RX ORDER — SODIUM CHLORIDE 0.9 % (FLUSH) 0.9 %
5-40 SYRINGE (ML) INJECTION EVERY 12 HOURS SCHEDULED
Status: DISCONTINUED | OUTPATIENT
Start: 2022-01-01 | End: 2022-01-01 | Stop reason: SDUPTHER

## 2022-01-01 RX ORDER — MORPHINE SULFATE 2 MG/ML
2 INJECTION, SOLUTION INTRAMUSCULAR; INTRAVENOUS
Status: DISCONTINUED | OUTPATIENT
Start: 2022-01-01 | End: 2022-01-01 | Stop reason: HOSPADM

## 2022-01-01 RX ORDER — SODIUM CHLORIDE 9 MG/ML
INJECTION, SOLUTION INTRAVENOUS PRN
Status: DISCONTINUED | OUTPATIENT
Start: 2022-01-01 | End: 2022-01-01 | Stop reason: SDUPTHER

## 2022-01-01 RX ORDER — HEPARIN SODIUM 1000 [USP'U]/ML
INJECTION, SOLUTION INTRAVENOUS; SUBCUTANEOUS PRN
Status: DISCONTINUED | OUTPATIENT
Start: 2022-01-01 | End: 2022-01-01 | Stop reason: SDUPTHER

## 2022-01-01 RX ORDER — CLOPIDOGREL BISULFATE 75 MG/1
75 TABLET ORAL DAILY
Qty: 90 TABLET | Refills: 3 | Status: SHIPPED | OUTPATIENT
Start: 2022-01-01

## 2022-01-01 RX ORDER — ATORVASTATIN CALCIUM 20 MG/1
20 TABLET, FILM COATED ORAL DAILY
Qty: 90 TABLET | Refills: 1 | Status: SHIPPED | OUTPATIENT
Start: 2022-01-01 | End: 2022-01-01

## 2022-01-01 RX ORDER — SODIUM CHLORIDE 9 MG/ML
INJECTION, SOLUTION INTRAVENOUS PRN
Status: DISCONTINUED | OUTPATIENT
Start: 2022-01-01 | End: 2022-01-01

## 2022-01-01 RX ORDER — METOPROLOL SUCCINATE 25 MG/1
25 TABLET, EXTENDED RELEASE ORAL DAILY
Qty: 90 TABLET | Refills: 3 | Status: SHIPPED | OUTPATIENT
Start: 2022-01-01

## 2022-01-01 RX ORDER — ASPIRIN 325 MG
325 TABLET ORAL ONCE
Status: CANCELLED | OUTPATIENT
Start: 2022-01-01 | End: 2022-01-01

## 2022-01-01 RX ORDER — SODIUM CHLORIDE 0.9 % (FLUSH) 0.9 %
5-40 SYRINGE (ML) INJECTION EVERY 12 HOURS SCHEDULED
Status: DISCONTINUED | OUTPATIENT
Start: 2022-01-01 | End: 2022-01-01 | Stop reason: HOSPADM

## 2022-01-01 RX ORDER — ACETAMINOPHEN 325 MG/1
650 TABLET ORAL EVERY 6 HOURS PRN
Status: DISCONTINUED | OUTPATIENT
Start: 2022-01-01 | End: 2022-01-01 | Stop reason: HOSPADM

## 2022-01-01 RX ORDER — MIDAZOLAM HYDROCHLORIDE 1 MG/ML
2 INJECTION INTRAMUSCULAR; INTRAVENOUS ONCE
Status: COMPLETED | OUTPATIENT
Start: 2022-01-01 | End: 2022-01-01

## 2022-01-01 RX ORDER — EPHEDRINE SULFATE 50 MG/ML
INJECTION INTRAVENOUS PRN
Status: DISCONTINUED | OUTPATIENT
Start: 2022-01-01 | End: 2022-01-01 | Stop reason: SDUPTHER

## 2022-01-01 RX ORDER — SODIUM CHLORIDE 0.9 % (FLUSH) 0.9 %
5-40 SYRINGE (ML) INJECTION EVERY 12 HOURS SCHEDULED
Status: CANCELLED | OUTPATIENT
Start: 2022-01-01

## 2022-01-01 RX ORDER — NITROGLYCERIN 20 MG/100ML
5-200 INJECTION INTRAVENOUS CONTINUOUS
Status: DISCONTINUED | OUTPATIENT
Start: 2022-01-01 | End: 2022-01-01

## 2022-01-01 RX ORDER — SODIUM CHLORIDE 0.9 % (FLUSH) 0.9 %
5-40 SYRINGE (ML) INJECTION PRN
Status: CANCELLED | OUTPATIENT
Start: 2022-01-01

## 2022-01-01 RX ORDER — SODIUM CHLORIDE 9 MG/ML
INJECTION, SOLUTION INTRAVENOUS CONTINUOUS
Status: DISCONTINUED | OUTPATIENT
Start: 2022-01-01 | End: 2022-01-01

## 2022-01-01 RX ORDER — ASPIRIN 81 MG/1
81 TABLET ORAL DAILY
Status: DISCONTINUED | OUTPATIENT
Start: 2022-01-01 | End: 2022-01-01 | Stop reason: SDUPTHER

## 2022-01-01 RX ORDER — MIDAZOLAM IN NACL,ISO-OSMOT/PF 50 MG/50ML
1-10 INFUSION BOTTLE (ML) INTRAVENOUS CONTINUOUS
Status: DISCONTINUED | OUTPATIENT
Start: 2022-01-01 | End: 2022-01-01

## 2022-01-01 RX ORDER — CALCIUM GLUCONATE 94 MG/ML
1000 INJECTION, SOLUTION INTRAVENOUS ONCE
Status: DISCONTINUED | OUTPATIENT
Start: 2022-01-01 | End: 2022-01-01

## 2022-01-01 RX ORDER — NOREPINEPHRINE BIT/0.9 % NACL 16MG/250ML
1-100 INFUSION BOTTLE (ML) INTRAVENOUS CONTINUOUS
Status: DISCONTINUED | OUTPATIENT
Start: 2022-01-01 | End: 2022-01-01

## 2022-01-01 RX ORDER — INSULIN LISPRO 100 [IU]/ML
0-4 INJECTION, SOLUTION INTRAVENOUS; SUBCUTANEOUS EVERY 4 HOURS
Status: DISCONTINUED | OUTPATIENT
Start: 2022-01-01 | End: 2022-01-01

## 2022-01-01 RX ORDER — MECLIZINE HYDROCHLORIDE CHEWABLE TABLETS 25 MG/1
12.5 TABLET, CHEWABLE ORAL 3 TIMES DAILY PRN
COMMUNITY

## 2022-01-01 RX ORDER — POTASSIUM CHLORIDE 20 MEQ/1
20 TABLET, EXTENDED RELEASE ORAL ONCE
Status: COMPLETED | OUTPATIENT
Start: 2022-01-01 | End: 2022-01-01

## 2022-01-01 RX ORDER — CLOPIDOGREL BISULFATE 75 MG/1
75 TABLET ORAL DAILY
Status: DISCONTINUED | OUTPATIENT
Start: 2022-01-01 | End: 2022-01-01 | Stop reason: HOSPADM

## 2022-01-01 RX ORDER — PROPOFOL 10 MG/ML
INJECTION, EMULSION INTRAVENOUS PRN
Status: DISCONTINUED | OUTPATIENT
Start: 2022-01-01 | End: 2022-01-01 | Stop reason: SDUPTHER

## 2022-01-01 RX ORDER — CLOPIDOGREL BISULFATE 75 MG/1
75 TABLET ORAL DAILY
Qty: 30 TABLET | Refills: 3 | Status: SHIPPED | OUTPATIENT
Start: 2022-01-01 | End: 2022-01-01 | Stop reason: SDUPTHER

## 2022-01-01 RX ORDER — SODIUM CHLORIDE 9 MG/ML
INJECTION, SOLUTION INTRAVENOUS PRN
Status: CANCELLED | OUTPATIENT
Start: 2022-01-01

## 2022-01-01 RX ORDER — HEPARIN SODIUM 1000 [USP'U]/ML
40 INJECTION, SOLUTION INTRAVENOUS; SUBCUTANEOUS PRN
Status: DISCONTINUED | OUTPATIENT
Start: 2022-01-01 | End: 2022-01-01 | Stop reason: HOSPADM

## 2022-01-01 RX ORDER — SODIUM CHLORIDE 0.9 % (FLUSH) 0.9 %
5-40 SYRINGE (ML) INJECTION PRN
Status: DISCONTINUED | OUTPATIENT
Start: 2022-01-01 | End: 2022-01-01 | Stop reason: HOSPADM

## 2022-01-01 RX ORDER — DIPHENHYDRAMINE HCL 50 MG
50 CAPSULE ORAL ONCE
Status: DISCONTINUED | OUTPATIENT
Start: 2022-01-01 | End: 2022-01-01 | Stop reason: HOSPADM

## 2022-01-01 RX ORDER — FENTANYL CITRATE 50 UG/ML
25 INJECTION, SOLUTION INTRAMUSCULAR; INTRAVENOUS
Status: DISCONTINUED | OUTPATIENT
Start: 2022-01-01 | End: 2022-01-01

## 2022-01-01 RX ORDER — ONDANSETRON 4 MG/1
4 TABLET, ORALLY DISINTEGRATING ORAL EVERY 8 HOURS PRN
Status: DISCONTINUED | OUTPATIENT
Start: 2022-01-01 | End: 2022-01-01 | Stop reason: HOSPADM

## 2022-01-01 RX ORDER — DEXMEDETOMIDINE HYDROCHLORIDE 4 UG/ML
.1-1.5 INJECTION, SOLUTION INTRAVENOUS CONTINUOUS
Status: DISCONTINUED | OUTPATIENT
Start: 2022-01-01 | End: 2022-01-01

## 2022-01-01 RX ORDER — NOREPINEPHRINE BIT/0.9 % NACL 16MG/250ML
1-100 INFUSION BOTTLE (ML) INTRAVENOUS CONTINUOUS
Status: DISCONTINUED | OUTPATIENT
Start: 2022-01-01 | End: 2022-01-01 | Stop reason: HOSPADM

## 2022-01-01 RX ORDER — PHENTOLAMINE MESYLATE 5 MG/1
5 INJECTION INTRAMUSCULAR; INTRAVENOUS ONCE
Status: COMPLETED | OUTPATIENT
Start: 2022-01-01 | End: 2022-01-01

## 2022-01-01 RX ORDER — PANTOPRAZOLE SODIUM 40 MG/10ML
40 INJECTION, POWDER, LYOPHILIZED, FOR SOLUTION INTRAVENOUS 2 TIMES DAILY
Status: DISCONTINUED | OUTPATIENT
Start: 2022-01-01 | End: 2022-01-01 | Stop reason: HOSPADM

## 2022-01-01 RX ORDER — EPINEPHRINE 0.1 MG/ML
SYRINGE (ML) INJECTION
Status: COMPLETED | OUTPATIENT
Start: 2022-01-01 | End: 2022-01-01

## 2022-01-01 RX ORDER — LORAZEPAM 2 MG/ML
2 INJECTION INTRAMUSCULAR EVERY 6 HOURS PRN
Status: DISCONTINUED | OUTPATIENT
Start: 2022-01-01 | End: 2022-01-01 | Stop reason: HOSPADM

## 2022-01-01 RX ORDER — LEVOTHYROXINE SODIUM 0.1 MG/1
100 TABLET ORAL DAILY
Qty: 30 TABLET | Refills: 3 | Status: SHIPPED | OUTPATIENT
Start: 2022-01-01

## 2022-01-01 RX ORDER — MAGNESIUM SULFATE HEPTAHYDRATE 500 MG/ML
INJECTION, SOLUTION INTRAMUSCULAR; INTRAVENOUS
Status: COMPLETED | OUTPATIENT
Start: 2022-01-01 | End: 2022-01-01

## 2022-01-01 RX ORDER — CHLORHEXIDINE GLUCONATE 4 G/100ML
SOLUTION TOPICAL ONCE
Status: CANCELLED | OUTPATIENT
Start: 2022-01-01 | End: 2022-01-01

## 2022-01-01 RX ORDER — SODIUM CHLORIDE 9 MG/ML
INJECTION, SOLUTION INTRAVENOUS CONTINUOUS
Status: DISCONTINUED | OUTPATIENT
Start: 2022-01-01 | End: 2022-01-01 | Stop reason: HOSPADM

## 2022-01-01 RX ORDER — CALCIUM CHLORIDE 100 MG/ML
INJECTION INTRAVENOUS; INTRAVENTRICULAR
Status: COMPLETED
Start: 2022-01-01 | End: 2022-01-01

## 2022-01-01 RX ORDER — MORPHINE SULFATE 4 MG/ML
4 INJECTION, SOLUTION INTRAMUSCULAR; INTRAVENOUS
Status: DISCONTINUED | OUTPATIENT
Start: 2022-01-01 | End: 2022-01-01 | Stop reason: HOSPADM

## 2022-01-01 RX ORDER — MIDAZOLAM HYDROCHLORIDE 1 MG/ML
INJECTION INTRAMUSCULAR; INTRAVENOUS PRN
Status: DISCONTINUED | OUTPATIENT
Start: 2022-01-01 | End: 2022-01-01 | Stop reason: ALTCHOICE

## 2022-01-01 RX ORDER — ACETAMINOPHEN 325 MG/1
650 TABLET ORAL EVERY 6 HOURS PRN
Status: DISCONTINUED | OUTPATIENT
Start: 2022-01-01 | End: 2022-01-01 | Stop reason: SDUPTHER

## 2022-01-01 RX ORDER — FENTANYL CITRATE 50 UG/ML
INJECTION, SOLUTION INTRAMUSCULAR; INTRAVENOUS PRN
Status: DISCONTINUED | OUTPATIENT
Start: 2022-01-01 | End: 2022-01-01 | Stop reason: SDUPTHER

## 2022-01-01 RX ORDER — INSULIN LISPRO 100 [IU]/ML
0-4 INJECTION, SOLUTION INTRAVENOUS; SUBCUTANEOUS NIGHTLY
Status: DISCONTINUED | OUTPATIENT
Start: 2022-01-01 | End: 2022-01-01 | Stop reason: HOSPADM

## 2022-01-01 RX ORDER — MIDAZOLAM HYDROCHLORIDE 1 MG/ML
4 INJECTION INTRAMUSCULAR; INTRAVENOUS ONCE
Status: COMPLETED | OUTPATIENT
Start: 2022-01-01 | End: 2022-01-01

## 2022-01-01 RX ORDER — ACETAMINOPHEN 650 MG/1
650 SUPPOSITORY RECTAL EVERY 6 HOURS PRN
Status: DISCONTINUED | OUTPATIENT
Start: 2022-01-01 | End: 2022-01-01 | Stop reason: HOSPADM

## 2022-01-01 RX ORDER — NOREPINEPHRINE BIT/0.9 % NACL 16MG/250ML
INFUSION BOTTLE (ML) INTRAVENOUS
Status: COMPLETED
Start: 2022-01-01 | End: 2022-01-01

## 2022-01-01 RX ORDER — BUMETANIDE 0.25 MG/ML
2 INJECTION, SOLUTION INTRAMUSCULAR; INTRAVENOUS ONCE
Status: COMPLETED | OUTPATIENT
Start: 2022-01-01 | End: 2022-01-01

## 2022-01-01 RX ORDER — NITROGLYCERIN 20 MG/100ML
INJECTION INTRAVENOUS PRN
Status: DISCONTINUED | OUTPATIENT
Start: 2022-01-01 | End: 2022-01-01 | Stop reason: SDUPTHER

## 2022-01-01 RX ORDER — LORAZEPAM 2 MG/ML
2 INJECTION INTRAMUSCULAR ONCE
Status: COMPLETED | OUTPATIENT
Start: 2022-01-01 | End: 2022-01-01

## 2022-01-01 RX ORDER — LEVOTHYROXINE SODIUM 0.1 MG/1
100 TABLET ORAL DAILY
Status: ON HOLD | COMMUNITY
End: 2022-01-01 | Stop reason: SDUPTHER

## 2022-01-01 RX ORDER — ASPIRIN 81 MG/1
81 TABLET, CHEWABLE ORAL DAILY
Status: DISCONTINUED | OUTPATIENT
Start: 2022-01-01 | End: 2022-01-01 | Stop reason: HOSPADM

## 2022-01-01 RX ORDER — ACETAMINOPHEN 325 MG/1
650 TABLET ORAL EVERY 4 HOURS PRN
Status: DISCONTINUED | OUTPATIENT
Start: 2022-01-01 | End: 2022-01-01 | Stop reason: HOSPADM

## 2022-01-01 RX ORDER — ALBUMIN (HUMAN) 12.5 G/50ML
SOLUTION INTRAVENOUS CONTINUOUS PRN
Status: COMPLETED | OUTPATIENT
Start: 2022-01-01 | End: 2022-01-01

## 2022-01-01 RX ORDER — FUROSEMIDE 10 MG/ML
80 INJECTION INTRAMUSCULAR; INTRAVENOUS ONCE
Status: COMPLETED | OUTPATIENT
Start: 2022-01-01 | End: 2022-01-01

## 2022-01-01 RX ORDER — CALCIUM GLUCONATE 94 MG/ML
INJECTION, SOLUTION INTRAVENOUS
Status: DISCONTINUED
Start: 2022-01-01 | End: 2022-01-01

## 2022-01-01 RX ORDER — SODIUM CHLORIDE 9 MG/ML
INJECTION, SOLUTION INTRAVENOUS CONTINUOUS PRN
Status: DISCONTINUED | OUTPATIENT
Start: 2022-01-01 | End: 2022-01-01 | Stop reason: SDUPTHER

## 2022-01-01 RX ORDER — MIDAZOLAM HYDROCHLORIDE 1 MG/ML
INJECTION INTRAMUSCULAR; INTRAVENOUS PRN
Status: DISCONTINUED | OUTPATIENT
Start: 2022-01-01 | End: 2022-01-01 | Stop reason: SDUPTHER

## 2022-01-01 RX ORDER — DEXAMETHASONE SODIUM PHOSPHATE 10 MG/ML
INJECTION, EMULSION INTRAMUSCULAR; INTRAVENOUS PRN
Status: DISCONTINUED | OUTPATIENT
Start: 2022-01-01 | End: 2022-01-01 | Stop reason: SDUPTHER

## 2022-01-01 RX ORDER — EPINEPHRINE 1 MG/ML
INJECTION, SOLUTION, CONCENTRATE INTRAVENOUS PRN
Status: DISCONTINUED | OUTPATIENT
Start: 2022-01-01 | End: 2022-01-01 | Stop reason: SDUPTHER

## 2022-01-01 RX ORDER — POTASSIUM CHLORIDE 29.8 MG/ML
20 INJECTION INTRAVENOUS PRN
Status: DISCONTINUED | OUTPATIENT
Start: 2022-01-01 | End: 2022-01-01

## 2022-01-01 RX ORDER — LOSARTAN POTASSIUM 25 MG/1
25 TABLET ORAL DAILY
Qty: 30 TABLET | Refills: 3 | Status: SHIPPED | OUTPATIENT
Start: 2022-01-01 | End: 2022-01-01

## 2022-01-01 RX ORDER — POTASSIUM CHLORIDE 7.45 MG/ML
10 INJECTION INTRAVENOUS PRN
Status: DISCONTINUED | OUTPATIENT
Start: 2022-01-01 | End: 2022-01-01

## 2022-01-01 RX ORDER — MAGNESIUM SULFATE IN WATER 40 MG/ML
2000 INJECTION, SOLUTION INTRAVENOUS ONCE
Status: COMPLETED | OUTPATIENT
Start: 2022-01-01 | End: 2022-01-01

## 2022-01-01 RX ORDER — SODIUM CHLORIDE 9 MG/ML
INJECTION, SOLUTION INTRAVENOUS CONTINUOUS
Status: DISCONTINUED | OUTPATIENT
Start: 2022-01-01 | End: 2022-01-01 | Stop reason: SDUPTHER

## 2022-01-01 RX ORDER — MIDAZOLAM HYDROCHLORIDE 1 MG/ML
INJECTION INTRAMUSCULAR; INTRAVENOUS
Status: COMPLETED
Start: 2022-01-01 | End: 2022-01-01

## 2022-01-01 RX ORDER — ALBUMIN (HUMAN) 12.5 G/50ML
25 SOLUTION INTRAVENOUS
Status: COMPLETED | OUTPATIENT
Start: 2022-01-01 | End: 2022-01-01

## 2022-01-01 RX ORDER — SODIUM CHLORIDE 0.9 % (FLUSH) 0.9 %
5-40 SYRINGE (ML) INJECTION PRN
Status: DISCONTINUED | OUTPATIENT
Start: 2022-01-01 | End: 2022-01-01 | Stop reason: SDUPTHER

## 2022-01-01 RX ORDER — ALBUMIN (HUMAN) 12.5 G/50ML
25 SOLUTION INTRAVENOUS EVERY 4 HOURS
Status: COMPLETED | OUTPATIENT
Start: 2022-01-01 | End: 2022-01-01

## 2022-01-01 RX ORDER — ONDANSETRON 2 MG/ML
4 INJECTION INTRAMUSCULAR; INTRAVENOUS EVERY 6 HOURS PRN
Status: DISCONTINUED | OUTPATIENT
Start: 2022-01-01 | End: 2022-01-01 | Stop reason: HOSPADM

## 2022-01-01 RX ORDER — GLYCOPYRROLATE 1 MG/5 ML
0.2 SYRINGE (ML) INTRAVENOUS EVERY 4 HOURS PRN
Status: DISCONTINUED | OUTPATIENT
Start: 2022-01-01 | End: 2022-01-01 | Stop reason: HOSPADM

## 2022-01-01 RX ORDER — MECLIZINE HCL 12.5 MG/1
12.5 TABLET ORAL 3 TIMES DAILY PRN
Status: DISCONTINUED | OUTPATIENT
Start: 2022-01-01 | End: 2022-01-01 | Stop reason: HOSPADM

## 2022-01-01 RX ORDER — DEXTROSE MONOHYDRATE 100 MG/ML
INJECTION, SOLUTION INTRAVENOUS CONTINUOUS PRN
Status: DISCONTINUED | OUTPATIENT
Start: 2022-01-01 | End: 2022-01-01 | Stop reason: HOSPADM

## 2022-01-01 RX ORDER — HEPARIN SODIUM 5000 [USP'U]/ML
5000 INJECTION, SOLUTION INTRAVENOUS; SUBCUTANEOUS 2 TIMES DAILY
Status: DISCONTINUED | OUTPATIENT
Start: 2022-01-01 | End: 2022-01-01

## 2022-01-01 RX ORDER — LOSARTAN POTASSIUM 25 MG/1
25 TABLET ORAL DAILY
Qty: 90 TABLET | Refills: 3 | Status: CANCELLED | OUTPATIENT
Start: 2022-01-01

## 2022-01-01 RX ORDER — ACETAMINOPHEN 325 MG/1
650 TABLET ORAL EVERY 6 HOURS PRN
COMMUNITY

## 2022-01-01 RX ORDER — ROCURONIUM BROMIDE 10 MG/ML
INJECTION, SOLUTION INTRAVENOUS PRN
Status: DISCONTINUED | OUTPATIENT
Start: 2022-01-01 | End: 2022-01-01 | Stop reason: SDUPTHER

## 2022-01-01 RX ORDER — HEPARIN SODIUM 1000 [USP'U]/ML
80 INJECTION, SOLUTION INTRAVENOUS; SUBCUTANEOUS PRN
Status: DISCONTINUED | OUTPATIENT
Start: 2022-01-01 | End: 2022-01-01 | Stop reason: HOSPADM

## 2022-01-01 RX ORDER — 0.9 % SODIUM CHLORIDE 0.9 %
INTRAVENOUS SOLUTION INTRAVENOUS CONTINUOUS PRN
Status: COMPLETED | OUTPATIENT
Start: 2022-01-01 | End: 2022-01-01

## 2022-01-01 RX ORDER — PROTAMINE SULFATE 10 MG/ML
INJECTION, SOLUTION INTRAVENOUS PRN
Status: DISCONTINUED | OUTPATIENT
Start: 2022-01-01 | End: 2022-01-01 | Stop reason: SDUPTHER

## 2022-01-01 RX ORDER — CALCIUM GLUCONATE 20 MG/ML
2000 INJECTION, SOLUTION INTRAVENOUS ONCE
Status: COMPLETED | OUTPATIENT
Start: 2022-01-01 | End: 2022-01-01

## 2022-01-01 RX ORDER — ATORVASTATIN CALCIUM 10 MG/1
10 TABLET, FILM COATED ORAL DAILY
Qty: 90 TABLET | Refills: 3 | Status: SHIPPED | OUTPATIENT
Start: 2022-01-01

## 2022-01-01 RX ORDER — 0.9 % SODIUM CHLORIDE 0.9 %
1000 INTRAVENOUS SOLUTION INTRAVENOUS ONCE
Status: DISCONTINUED | OUTPATIENT
Start: 2022-01-01 | End: 2022-01-01

## 2022-01-01 RX ORDER — INSULIN LISPRO 100 [IU]/ML
0-16 INJECTION, SOLUTION INTRAVENOUS; SUBCUTANEOUS
Status: DISCONTINUED | OUTPATIENT
Start: 2022-01-01 | End: 2022-01-01 | Stop reason: HOSPADM

## 2022-01-01 RX ORDER — ENOXAPARIN SODIUM 100 MG/ML
30 INJECTION SUBCUTANEOUS DAILY
Status: DISCONTINUED | OUTPATIENT
Start: 2022-01-01 | End: 2022-01-01 | Stop reason: HOSPADM

## 2022-01-01 RX ORDER — POTASSIUM CHLORIDE 29.8 MG/ML
20 INJECTION INTRAVENOUS
Status: COMPLETED | OUTPATIENT
Start: 2022-01-01 | End: 2022-01-01

## 2022-01-01 RX ORDER — ALBUMIN (HUMAN) 12.5 G/50ML
25 SOLUTION INTRAVENOUS
Status: ACTIVE | OUTPATIENT
Start: 2022-01-01 | End: 2022-01-01

## 2022-01-01 RX ORDER — ASPIRIN 325 MG
325 TABLET ORAL ONCE
Status: DISCONTINUED | OUTPATIENT
Start: 2022-01-01 | End: 2022-01-01 | Stop reason: HOSPADM

## 2022-01-01 RX ORDER — NITROGLYCERIN 0.4 MG/1
TABLET SUBLINGUAL
Qty: 25 TABLET | Refills: 3 | Status: SHIPPED | OUTPATIENT
Start: 2022-01-01

## 2022-01-01 RX ORDER — MILRINONE LACTATE 0.2 MG/ML
.0625-.75 INJECTION, SOLUTION INTRAVENOUS CONTINUOUS
Status: DISCONTINUED | OUTPATIENT
Start: 2022-01-01 | End: 2022-01-01

## 2022-01-01 RX ORDER — ASPIRIN 81 MG/1
81 TABLET ORAL DAILY
Qty: 30 TABLET | Refills: 3 | COMMUNITY
Start: 2022-01-01

## 2022-01-01 RX ORDER — FENTANYL CITRATE 50 UG/ML
INJECTION, SOLUTION INTRAMUSCULAR; INTRAVENOUS PRN
Status: DISCONTINUED | OUTPATIENT
Start: 2022-01-01 | End: 2022-01-01 | Stop reason: ALTCHOICE

## 2022-01-01 RX ORDER — SODIUM CHLORIDE 9 MG/ML
25 INJECTION, SOLUTION INTRAVENOUS PRN
Status: DISCONTINUED | OUTPATIENT
Start: 2022-01-01 | End: 2022-01-01 | Stop reason: HOSPADM

## 2022-01-01 RX ORDER — LIDOCAINE HYDROCHLORIDE 20 MG/ML
INJECTION, SOLUTION INFILTRATION; PERINEURAL PRN
Status: DISCONTINUED | OUTPATIENT
Start: 2022-01-01 | End: 2022-01-01 | Stop reason: SDUPTHER

## 2022-01-01 RX ORDER — ASPIRIN 81 MG/1
81 TABLET, CHEWABLE ORAL DAILY
Status: DISCONTINUED | OUTPATIENT
Start: 2022-01-01 | End: 2022-01-01

## 2022-01-01 RX ORDER — LABETALOL HYDROCHLORIDE 5 MG/ML
10 INJECTION, SOLUTION INTRAVENOUS EVERY 6 HOURS PRN
Status: DISCONTINUED | OUTPATIENT
Start: 2022-01-01 | End: 2022-01-01 | Stop reason: HOSPADM

## 2022-01-01 RX ORDER — NITROGLYCERIN 0.4 MG/1
0.4 TABLET SUBLINGUAL EVERY 5 MIN PRN
Status: CANCELLED | OUTPATIENT
Start: 2022-01-01

## 2022-01-01 RX ORDER — ASPIRIN 81 MG/1
81 TABLET, CHEWABLE ORAL ONCE
Status: COMPLETED | OUTPATIENT
Start: 2022-01-01 | End: 2022-01-01

## 2022-01-01 RX ORDER — ACETAZOLAMIDE 250 MG/1
250 TABLET ORAL ONCE
Status: COMPLETED | OUTPATIENT
Start: 2022-01-01 | End: 2022-01-01

## 2022-01-01 RX ADMIN — LEVOTHYROXINE SODIUM 100 MCG: 0.1 TABLET ORAL at 11:40

## 2022-01-01 RX ADMIN — SODIUM CHLORIDE, PRESERVATIVE FREE 10 ML: 5 INJECTION INTRAVENOUS at 20:57

## 2022-01-01 RX ADMIN — ALBUMIN (HUMAN) 25 G: 0.25 INJECTION, SOLUTION INTRAVENOUS at 19:56

## 2022-01-01 RX ADMIN — IOPAMIDOL 150 ML: 755 INJECTION, SOLUTION INTRAVENOUS at 11:00

## 2022-01-01 RX ADMIN — BARIUM SULFATE 30 ML: 400 PASTE ORAL at 08:53

## 2022-01-01 RX ADMIN — ALBUMIN (HUMAN) 25 G: 0.25 INJECTION, SOLUTION INTRAVENOUS at 03:57

## 2022-01-01 RX ADMIN — Medication 1 MG: at 10:49

## 2022-01-01 RX ADMIN — VASOPRESSIN 0.04 UNITS/MIN: 20 INJECTION PARENTERAL at 11:23

## 2022-01-01 RX ADMIN — Medication 0.5 MG: at 11:00

## 2022-01-01 RX ADMIN — FENTANYL CITRATE 25 MCG: 50 INJECTION, SOLUTION INTRAMUSCULAR; INTRAVENOUS at 08:19

## 2022-01-01 RX ADMIN — ALTEPLASE 50 MG: KIT at 15:00

## 2022-01-01 RX ADMIN — EPINEPHRINE 0.1 MG: 1 INJECTION, SOLUTION, CONCENTRATE INTRAVENOUS at 10:04

## 2022-01-01 RX ADMIN — EPINEPHRINE 0.2 MG: 1 INJECTION, SOLUTION, CONCENTRATE INTRAVENOUS at 10:58

## 2022-01-01 RX ADMIN — PANTOPRAZOLE SODIUM 40 MG: 40 INJECTION, POWDER, FOR SOLUTION INTRAVENOUS at 20:59

## 2022-01-01 RX ADMIN — ALBUMIN (HUMAN) 25 G: 0.25 INJECTION, SOLUTION INTRAVENOUS at 21:44

## 2022-01-01 RX ADMIN — METOPROLOL SUCCINATE 25 MG: 25 TABLET, EXTENDED RELEASE ORAL at 00:01

## 2022-01-01 RX ADMIN — EPINEPHRINE 0.1 MG: 1 INJECTION, SOLUTION, CONCENTRATE INTRAVENOUS at 10:22

## 2022-01-01 RX ADMIN — SODIUM CHLORIDE 75 ML/HR: 9 INJECTION, SOLUTION INTRAVENOUS at 20:20

## 2022-01-01 RX ADMIN — ALBUMIN (HUMAN) 25 G: 0.25 INJECTION, SOLUTION INTRAVENOUS at 07:08

## 2022-01-01 RX ADMIN — Medication 20 MCG/MIN: at 20:31

## 2022-01-01 RX ADMIN — EPINEPHRINE 0.1 MG: 1 INJECTION, SOLUTION, CONCENTRATE INTRAVENOUS at 10:15

## 2022-01-01 RX ADMIN — EPINEPHRINE 0.1 MG: 1 INJECTION, SOLUTION, CONCENTRATE INTRAVENOUS at 10:43

## 2022-01-01 RX ADMIN — ALBUMIN (HUMAN) 25 G: 0.25 INJECTION, SOLUTION INTRAVENOUS at 19:32

## 2022-01-01 RX ADMIN — SODIUM CHLORIDE, PRESERVATIVE FREE 10 ML: 5 INJECTION INTRAVENOUS at 23:23

## 2022-01-01 RX ADMIN — LEVOTHYROXINE SODIUM 100 MCG: 0.1 TABLET ORAL at 06:09

## 2022-01-01 RX ADMIN — ROCURONIUM BROMIDE 10 MG: 10 INJECTION, SOLUTION INTRAVENOUS at 08:43

## 2022-01-01 RX ADMIN — MIDAZOLAM 2 MG: 1 INJECTION INTRAMUSCULAR; INTRAVENOUS at 11:58

## 2022-01-01 RX ADMIN — HEPARIN SODIUM 2000 UNITS: 1000 INJECTION, SOLUTION INTRAVENOUS; SUBCUTANEOUS at 08:26

## 2022-01-01 RX ADMIN — SODIUM CHLORIDE: 9 INJECTION, SOLUTION INTRAVENOUS at 20:58

## 2022-01-01 RX ADMIN — CLOPIDOGREL BISULFATE 75 MG: 75 TABLET ORAL at 09:36

## 2022-01-01 RX ADMIN — SODIUM BICARBONATE: 84 INJECTION, SOLUTION INTRAVENOUS at 18:19

## 2022-01-01 RX ADMIN — PROTAMINE SULFATE 20 MG: 10 INJECTION, SOLUTION INTRAVENOUS at 09:38

## 2022-01-01 RX ADMIN — SODIUM CHLORIDE: 9 INJECTION, SOLUTION INTRAVENOUS at 10:32

## 2022-01-01 RX ADMIN — PROTAMINE SULFATE 30 MG: 10 INJECTION, SOLUTION INTRAVENOUS at 09:43

## 2022-01-01 RX ADMIN — VASOPRESSIN 5 UNITS: 20 INJECTION INTRAVENOUS at 09:37

## 2022-01-01 RX ADMIN — VASOPRESSIN 0.04 UNITS/MIN: 20 INJECTION PARENTERAL at 13:31

## 2022-01-01 RX ADMIN — NITROGLYCERIN 5 MCG/MIN: 20 INJECTION INTRAVENOUS at 04:18

## 2022-01-01 RX ADMIN — ATORVASTATIN CALCIUM 10 MG: 10 TABLET, FILM COATED ORAL at 07:28

## 2022-01-01 RX ADMIN — ENOXAPARIN SODIUM 30 MG: 100 INJECTION SUBCUTANEOUS at 07:36

## 2022-01-01 RX ADMIN — HEPARIN SODIUM AND DEXTROSE 14 UNITS/KG/HR: 10000; 5 INJECTION INTRAVENOUS at 22:57

## 2022-01-01 RX ADMIN — PHENYLEPHRINE HYDROCHLORIDE 300 MCG/MIN: 10 INJECTION INTRAVENOUS at 14:47

## 2022-01-01 RX ADMIN — CLOPIDOGREL BISULFATE 75 MG: 75 TABLET ORAL at 09:08

## 2022-01-01 RX ADMIN — ALBUTEROL SULFATE 4 PUFF: 90 AEROSOL, METERED RESPIRATORY (INHALATION) at 09:45

## 2022-01-01 RX ADMIN — CALCIUM GLUCONATE 2000 MG: 20 INJECTION, SOLUTION INTRAVENOUS at 21:42

## 2022-01-01 RX ADMIN — EPINEPHRINE 0.1 MG: 1 INJECTION, SOLUTION, CONCENTRATE INTRAVENOUS at 10:53

## 2022-01-01 RX ADMIN — SODIUM CHLORIDE 1000 MG: 9 INJECTION, SOLUTION INTRAVENOUS at 11:36

## 2022-01-01 RX ADMIN — SODIUM CHLORIDE, PRESERVATIVE FREE 10 ML: 5 INJECTION INTRAVENOUS at 20:40

## 2022-01-01 RX ADMIN — PROPOFOL 100 MG: 10 INJECTION, EMULSION INTRAVENOUS at 07:34

## 2022-01-01 RX ADMIN — Medication 12 MCG/MIN: at 03:21

## 2022-01-01 RX ADMIN — DEXTROSE MONOHYDRATE 2.5 MG/HR: 50 INJECTION, SOLUTION INTRAVENOUS at 03:57

## 2022-01-01 RX ADMIN — PANTOPRAZOLE SODIUM 40 MG: 40 INJECTION, POWDER, FOR SOLUTION INTRAVENOUS at 20:41

## 2022-01-01 RX ADMIN — CEFAZOLIN 2000 MG: 10 INJECTION, POWDER, FOR SOLUTION INTRAVENOUS at 06:29

## 2022-01-01 RX ADMIN — VASOPRESSIN 3 UNITS: 20 INJECTION INTRAVENOUS at 09:35

## 2022-01-01 RX ADMIN — LORAZEPAM 2 MG: 2 INJECTION INTRAMUSCULAR; INTRAVENOUS at 14:53

## 2022-01-01 RX ADMIN — ALBUMIN (HUMAN) 25 G: 0.25 INJECTION, SOLUTION INTRAVENOUS at 13:37

## 2022-01-01 RX ADMIN — LEVOTHYROXINE SODIUM 100 MCG: 0.1 TABLET ORAL at 06:33

## 2022-01-01 RX ADMIN — POTASSIUM CHLORIDE 20 MEQ: 29.8 INJECTION, SOLUTION INTRAVENOUS at 02:11

## 2022-01-01 RX ADMIN — SODIUM CHLORIDE: 9 INJECTION, SOLUTION INTRAVENOUS at 09:49

## 2022-01-01 RX ADMIN — ATORVASTATIN CALCIUM 10 MG: 10 TABLET, FILM COATED ORAL at 20:40

## 2022-01-01 RX ADMIN — Medication 50 MCG/HR: at 17:34

## 2022-01-01 RX ADMIN — FENTANYL CITRATE 25 MCG: 50 INJECTION, SOLUTION INTRAMUSCULAR; INTRAVENOUS at 13:06

## 2022-01-01 RX ADMIN — POTASSIUM CHLORIDE 20 MEQ: 29.8 INJECTION, SOLUTION INTRAVENOUS at 01:13

## 2022-01-01 RX ADMIN — INSULIN LISPRO 8 UNITS: 100 INJECTION, SOLUTION INTRAVENOUS; SUBCUTANEOUS at 18:35

## 2022-01-01 RX ADMIN — SODIUM CHLORIDE, PRESERVATIVE FREE 10 ML: 5 INJECTION INTRAVENOUS at 07:57

## 2022-01-01 RX ADMIN — DEXAMETHASONE SODIUM PHOSPHATE 8 MG: 10 INJECTION, EMULSION INTRAMUSCULAR; INTRAVENOUS at 07:48

## 2022-01-01 RX ADMIN — PANTOPRAZOLE SODIUM 40 MG: 40 INJECTION, POWDER, FOR SOLUTION INTRAVENOUS at 09:08

## 2022-01-01 RX ADMIN — LEVOTHYROXINE SODIUM 100 MCG: 0.1 TABLET ORAL at 06:17

## 2022-01-01 RX ADMIN — EPINEPHRINE 1 MG: 1 INJECTION, SOLUTION, CONCENTRATE INTRAVENOUS at 09:41

## 2022-01-01 RX ADMIN — WATER 1 ML: 1 INJECTION INTRAMUSCULAR; INTRAVENOUS; SUBCUTANEOUS at 06:49

## 2022-01-01 RX ADMIN — LORAZEPAM 2 MG: 2 INJECTION INTRAMUSCULAR; INTRAVENOUS at 11:32

## 2022-01-01 RX ADMIN — EPINEPHRINE 0.1 MG: 1 INJECTION, SOLUTION, CONCENTRATE INTRAVENOUS at 10:30

## 2022-01-01 RX ADMIN — MIDAZOLAM HYDROCHLORIDE 4 MG: 1 INJECTION, SOLUTION INTRAMUSCULAR; INTRAVENOUS at 16:57

## 2022-01-01 RX ADMIN — INSULIN LISPRO 2 UNITS: 100 INJECTION, SOLUTION INTRAVENOUS; SUBCUTANEOUS at 01:59

## 2022-01-01 RX ADMIN — Medication 1 ML: at 06:49

## 2022-01-01 RX ADMIN — ATORVASTATIN CALCIUM 10 MG: 10 TABLET, FILM COATED ORAL at 07:33

## 2022-01-01 RX ADMIN — ALBUMIN (HUMAN) 25 G: 0.25 INJECTION, SOLUTION INTRAVENOUS at 13:58

## 2022-01-01 RX ADMIN — SODIUM BICARBONATE 50 MEQ: 84 INJECTION, SOLUTION INTRAVENOUS at 14:26

## 2022-01-01 RX ADMIN — ATORVASTATIN CALCIUM 10 MG: 10 TABLET, FILM COATED ORAL at 23:18

## 2022-01-01 RX ADMIN — Medication 1 MG: at 10:52

## 2022-01-01 RX ADMIN — Medication 3 MCG/MIN: at 10:54

## 2022-01-01 RX ADMIN — Medication 50 MEQ: at 11:06

## 2022-01-01 RX ADMIN — Medication 4 MG/HR: at 08:43

## 2022-01-01 RX ADMIN — SODIUM CHLORIDE, PRESERVATIVE FREE 10 ML: 5 INJECTION INTRAVENOUS at 08:49

## 2022-01-01 RX ADMIN — CALCIUM CHLORIDE 1000 MG: 100 INJECTION, SOLUTION INTRAVENOUS at 14:30

## 2022-01-01 RX ADMIN — LEVOTHYROXINE SODIUM 100 MCG: 0.1 TABLET ORAL at 09:08

## 2022-01-01 RX ADMIN — SODIUM CHLORIDE: 9 INJECTION, SOLUTION INTRAVENOUS at 02:50

## 2022-01-01 RX ADMIN — ATORVASTATIN CALCIUM 10 MG: 10 TABLET, FILM COATED ORAL at 21:13

## 2022-01-01 RX ADMIN — SODIUM BICARBONATE 50 MEQ: 84 INJECTION, SOLUTION INTRAVENOUS at 10:08

## 2022-01-01 RX ADMIN — POTASSIUM CHLORIDE 20 MEQ: 1500 TABLET, EXTENDED RELEASE ORAL at 11:40

## 2022-01-01 RX ADMIN — POTASSIUM CHLORIDE 20 MEQ: 29.8 INJECTION, SOLUTION INTRAVENOUS at 00:11

## 2022-01-01 RX ADMIN — ALBUMIN (HUMAN) 25 G: 0.25 INJECTION, SOLUTION INTRAVENOUS at 11:13

## 2022-01-01 RX ADMIN — VASOPRESSIN 1 UNITS: 20 INJECTION INTRAVENOUS at 08:14

## 2022-01-01 RX ADMIN — CALCIUM GLUCONATE 1000 MG: 10 INJECTION, SOLUTION INTRAVENOUS at 09:37

## 2022-01-01 RX ADMIN — ALBUMIN (HUMAN) 25 G: 0.25 INJECTION, SOLUTION INTRAVENOUS at 20:41

## 2022-01-01 RX ADMIN — VASOPRESSIN 3 UNITS: 20 INJECTION INTRAVENOUS at 08:21

## 2022-01-01 RX ADMIN — METOPROLOL SUCCINATE 25 MG: 25 TABLET, EXTENDED RELEASE ORAL at 07:34

## 2022-01-01 RX ADMIN — ASPIRIN 81 MG: 81 TABLET, CHEWABLE ORAL at 06:48

## 2022-01-01 RX ADMIN — FUROSEMIDE 80 MG: 10 INJECTION, SOLUTION INTRAMUSCULAR; INTRAVENOUS at 16:55

## 2022-01-01 RX ADMIN — LOSARTAN POTASSIUM 25 MG: 25 TABLET ORAL at 07:28

## 2022-01-01 RX ADMIN — VASOPRESSIN 0.04 UNITS/MIN: 20 INJECTION INTRAVENOUS at 11:24

## 2022-01-01 RX ADMIN — NITROGLYCERIN 20 MCG/MIN: 20 INJECTION INTRAVENOUS at 22:21

## 2022-01-01 RX ADMIN — METOPROLOL SUCCINATE 25 MG: 25 TABLET, EXTENDED RELEASE ORAL at 07:28

## 2022-01-01 RX ADMIN — LEVOTHYROXINE SODIUM 100 MCG: 0.1 TABLET ORAL at 06:16

## 2022-01-01 RX ADMIN — ATORVASTATIN CALCIUM 10 MG: 10 TABLET, FILM COATED ORAL at 21:03

## 2022-01-01 RX ADMIN — POTASSIUM CHLORIDE 20 MEQ: 1500 TABLET, EXTENDED RELEASE ORAL at 08:49

## 2022-01-01 RX ADMIN — VASOPRESSIN 1 UNITS: 20 INJECTION INTRAVENOUS at 07:52

## 2022-01-01 RX ADMIN — EPINEPHRINE 0.1 MG: 1 INJECTION, SOLUTION, CONCENTRATE INTRAVENOUS at 10:46

## 2022-01-01 RX ADMIN — PANTOPRAZOLE SODIUM 40 MG: 40 INJECTION, POWDER, FOR SOLUTION INTRAVENOUS at 07:58

## 2022-01-01 RX ADMIN — PHENYLEPHRINE HYDROCHLORIDE 30 MCG/MIN: 10 INJECTION INTRAVENOUS at 05:39

## 2022-01-01 RX ADMIN — IOPAMIDOL 40 ML: 755 INJECTION, SOLUTION INTRAVENOUS at 10:28

## 2022-01-01 RX ADMIN — CALCIUM GLUCONATE 1000 MG: 10 INJECTION, SOLUTION INTRAVENOUS at 06:04

## 2022-01-01 RX ADMIN — SODIUM CHLORIDE, PRESERVATIVE FREE 10 ML: 5 INJECTION INTRAVENOUS at 20:59

## 2022-01-01 RX ADMIN — VASOPRESSIN 2 UNITS: 20 INJECTION INTRAVENOUS at 08:19

## 2022-01-01 RX ADMIN — SODIUM CHLORIDE 500 MG: 9 INJECTION, SOLUTION INTRAVENOUS at 20:59

## 2022-01-01 RX ADMIN — INSULIN LISPRO 2 UNITS: 100 INJECTION, SOLUTION INTRAVENOUS; SUBCUTANEOUS at 21:35

## 2022-01-01 RX ADMIN — HEPARIN SODIUM AND DEXTROSE 20 UNITS/KG/HR: 10000; 5 INJECTION INTRAVENOUS at 05:25

## 2022-01-01 RX ADMIN — FENTANYL CITRATE 25 MCG: 50 INJECTION, SOLUTION INTRAMUSCULAR; INTRAVENOUS at 21:52

## 2022-01-01 RX ADMIN — VASOPRESSIN 10 UNITS: 20 INJECTION INTRAVENOUS at 09:38

## 2022-01-01 RX ADMIN — LEVOTHYROXINE SODIUM 100 MCG: 100 TABLET ORAL at 07:36

## 2022-01-01 RX ADMIN — LEVOTHYROXINE SODIUM 100 MCG: 100 TABLET ORAL at 06:37

## 2022-01-01 RX ADMIN — BUMETANIDE 2 MG: 0.25 INJECTION INTRAMUSCULAR; INTRAVENOUS at 17:30

## 2022-01-01 RX ADMIN — HEPARIN SODIUM 2140 UNITS: 1000 INJECTION, SOLUTION INTRAVENOUS; SUBCUTANEOUS at 03:11

## 2022-01-01 RX ADMIN — Medication 2 MCG/MIN: at 03:38

## 2022-01-01 RX ADMIN — AMIODARONE HYDROCHLORIDE: 1.5 INJECTION, SOLUTION INTRAVENOUS at 16:37

## 2022-01-01 RX ADMIN — AMIODARONE HYDROCHLORIDE 150 MG: 1.5 INJECTION, SOLUTION INTRAVENOUS at 11:06

## 2022-01-01 RX ADMIN — SODIUM BICARBONATE 100 MEQ: 84 INJECTION INTRAVENOUS at 16:02

## 2022-01-01 RX ADMIN — VASOPRESSIN 1 UNITS: 20 INJECTION INTRAVENOUS at 08:17

## 2022-01-01 RX ADMIN — POTASSIUM CHLORIDE 20 MEQ: 29.8 INJECTION, SOLUTION INTRAVENOUS at 23:32

## 2022-01-01 RX ADMIN — IOPAMIDOL 80 ML: 755 INJECTION, SOLUTION INTRAVENOUS at 12:36

## 2022-01-01 RX ADMIN — FENTANYL CITRATE 25 MCG: 50 INJECTION, SOLUTION INTRAMUSCULAR; INTRAVENOUS at 10:16

## 2022-01-01 RX ADMIN — LOSARTAN POTASSIUM 25 MG: 25 TABLET, FILM COATED ORAL at 00:01

## 2022-01-01 RX ADMIN — PANTOPRAZOLE SODIUM 40 MG: 40 INJECTION, POWDER, FOR SOLUTION INTRAVENOUS at 20:58

## 2022-01-01 RX ADMIN — SODIUM CHLORIDE: 9 INJECTION, SOLUTION INTRAVENOUS at 09:27

## 2022-01-01 RX ADMIN — VASOPRESSIN 2 UNITS: 20 INJECTION INTRAVENOUS at 09:33

## 2022-01-01 RX ADMIN — ALBUMIN (HUMAN) 25 G: 0.25 INJECTION, SOLUTION INTRAVENOUS at 22:37

## 2022-01-01 RX ADMIN — SODIUM CHLORIDE, PRESERVATIVE FREE 10 ML: 5 INJECTION INTRAVENOUS at 20:15

## 2022-01-01 RX ADMIN — ALBUMIN (HUMAN) 25 G: 0.25 INJECTION, SOLUTION INTRAVENOUS at 11:22

## 2022-01-01 RX ADMIN — ASPIRIN 81 MG: 81 TABLET, CHEWABLE ORAL at 07:32

## 2022-01-01 RX ADMIN — LEVOTHYROXINE SODIUM 100 MCG: 0.1 TABLET ORAL at 07:33

## 2022-01-01 RX ADMIN — CLOPIDOGREL BISULFATE 75 MG: 75 TABLET ORAL at 07:36

## 2022-01-01 RX ADMIN — PHENYLEPHRINE HYDROCHLORIDE 30 MCG/MIN: 10 INJECTION INTRAVENOUS at 11:43

## 2022-01-01 RX ADMIN — SODIUM CHLORIDE 500 MG: 9 INJECTION, SOLUTION INTRAVENOUS at 09:12

## 2022-01-01 RX ADMIN — EPHEDRINE SULFATE 15 MG: 50 INJECTION, SOLUTION INTRAVENOUS at 08:23

## 2022-01-01 RX ADMIN — ALBUMIN (HUMAN) 25 G: 0.25 INJECTION, SOLUTION INTRAVENOUS at 00:40

## 2022-01-01 RX ADMIN — SODIUM CHLORIDE: 9 INJECTION, SOLUTION INTRAVENOUS at 09:33

## 2022-01-01 RX ADMIN — PANTOPRAZOLE SODIUM 40 MG: 40 INJECTION, POWDER, FOR SOLUTION INTRAVENOUS at 07:54

## 2022-01-01 RX ADMIN — MORPHINE SULFATE 4 MG: 4 INJECTION, SOLUTION INTRAMUSCULAR; INTRAVENOUS at 14:53

## 2022-01-01 RX ADMIN — SODIUM CHLORIDE: 9 INJECTION, SOLUTION INTRAVENOUS at 10:41

## 2022-01-01 RX ADMIN — ALBUMIN (HUMAN) 25 G: 0.25 INJECTION, SOLUTION INTRAVENOUS at 18:25

## 2022-01-01 RX ADMIN — HEPARIN SODIUM AND DEXTROSE 16 UNITS/KG/HR: 10000; 5 INJECTION INTRAVENOUS at 13:01

## 2022-01-01 RX ADMIN — EPINEPHRINE 0.1 MG: 1 INJECTION, SOLUTION, CONCENTRATE INTRAVENOUS at 10:09

## 2022-01-01 RX ADMIN — ENOXAPARIN SODIUM 30 MG: 100 INJECTION SUBCUTANEOUS at 08:46

## 2022-01-01 RX ADMIN — VASOPRESSIN 4 UNITS: 20 INJECTION INTRAVENOUS at 08:24

## 2022-01-01 RX ADMIN — SODIUM CHLORIDE: 9 INJECTION, SOLUTION INTRAVENOUS at 06:04

## 2022-01-01 RX ADMIN — FENTANYL CITRATE 25 MCG: 50 INJECTION, SOLUTION INTRAMUSCULAR; INTRAVENOUS at 02:14

## 2022-01-01 RX ADMIN — LIDOCAINE HYDROCHLORIDE 100 MG: 20 INJECTION, SOLUTION INTRAVENOUS at 11:00

## 2022-01-01 RX ADMIN — SODIUM BICARBONATE 50 MEQ: 84 INJECTION INTRAVENOUS at 17:15

## 2022-01-01 RX ADMIN — SODIUM CHLORIDE, PRESERVATIVE FREE 10 ML: 5 INJECTION INTRAVENOUS at 08:46

## 2022-01-01 RX ADMIN — MAGNESIUM SULFATE HEPTAHYDRATE 2000 MG: 40 INJECTION, SOLUTION INTRAVENOUS at 06:31

## 2022-01-01 RX ADMIN — VASOPRESSIN 5 UNITS: 20 INJECTION INTRAVENOUS at 09:36

## 2022-01-01 RX ADMIN — ALBUMIN (HUMAN) 25 G: 0.25 INJECTION, SOLUTION INTRAVENOUS at 21:37

## 2022-01-01 RX ADMIN — PHENTOLAMINE MESYLATE 5 MG: 5 INJECTION, POWDER, FOR SOLUTION INTRAMUSCULAR; INTRAVENOUS at 06:29

## 2022-01-01 RX ADMIN — LOSARTAN POTASSIUM 25 MG: 25 TABLET, FILM COATED ORAL at 08:46

## 2022-01-01 RX ADMIN — CALCIUM CHLORIDE 1000 MG: 100 INJECTION, SOLUTION INTRAVENOUS at 11:25

## 2022-01-01 RX ADMIN — LIDOCAINE HYDROCHLORIDE 60 MG: 20 INJECTION, SOLUTION INFILTRATION; PERINEURAL at 07:34

## 2022-01-01 RX ADMIN — Medication 50 MCG/MIN: at 09:40

## 2022-01-01 RX ADMIN — HEPARIN SODIUM 2140 UNITS: 1000 INJECTION, SOLUTION INTRAVENOUS; SUBCUTANEOUS at 07:36

## 2022-01-01 RX ADMIN — SODIUM CHLORIDE, PRESERVATIVE FREE 10 ML: 5 INJECTION INTRAVENOUS at 21:11

## 2022-01-01 RX ADMIN — SODIUM CHLORIDE, PRESERVATIVE FREE 10 ML: 5 INJECTION INTRAVENOUS at 09:15

## 2022-01-01 RX ADMIN — LOSARTAN POTASSIUM 25 MG: 25 TABLET ORAL at 07:34

## 2022-01-01 RX ADMIN — SODIUM BICARBONATE 50 MEQ: 84 INJECTION INTRAVENOUS at 17:37

## 2022-01-01 RX ADMIN — ACETAZOLAMIDE 250 MG: 250 TABLET ORAL at 02:57

## 2022-01-01 RX ADMIN — SODIUM CHLORIDE 500 MG: 9 INJECTION, SOLUTION INTRAVENOUS at 20:40

## 2022-01-01 RX ADMIN — Medication 14 MCG/MIN: at 22:38

## 2022-01-01 RX ADMIN — MAGNESIUM SULFATE HEPTAHYDRATE 2000 MG: 500 INJECTION, SOLUTION INTRAMUSCULAR; INTRAVENOUS at 11:00

## 2022-01-01 RX ADMIN — HEPARIN SODIUM 3000 UNITS: 1000 INJECTION, SOLUTION INTRAVENOUS; SUBCUTANEOUS at 09:23

## 2022-01-01 RX ADMIN — MIDAZOLAM 2 MG: 1 INJECTION INTRAMUSCULAR; INTRAVENOUS at 11:19

## 2022-01-01 RX ADMIN — NITROGLYCERIN 5 MCG/MIN: 20 INJECTION INTRAVENOUS at 20:16

## 2022-01-01 RX ADMIN — FENTANYL CITRATE 25 MCG: 50 INJECTION, SOLUTION INTRAMUSCULAR; INTRAVENOUS at 05:55

## 2022-01-01 RX ADMIN — VASOPRESSIN 0.04 UNITS/MIN: 20 INJECTION PARENTERAL at 04:37

## 2022-01-01 RX ADMIN — MIDAZOLAM 2 MG: 1 INJECTION INTRAMUSCULAR; INTRAVENOUS at 10:39

## 2022-01-01 RX ADMIN — ASPIRIN 81 MG: 81 TABLET ORAL at 07:28

## 2022-01-01 RX ADMIN — BUMETANIDE 1 MG/HR: 0.25 INJECTION INTRAMUSCULAR; INTRAVENOUS at 23:37

## 2022-01-01 RX ADMIN — Medication 50 MEQ: at 14:26

## 2022-01-01 RX ADMIN — SODIUM CHLORIDE, PRESERVATIVE FREE 10 ML: 5 INJECTION INTRAVENOUS at 20:07

## 2022-01-01 RX ADMIN — NITROGLYCERIN 10 MCG/MIN: 20 INJECTION INTRAVENOUS at 19:48

## 2022-01-01 RX ADMIN — SODIUM CHLORIDE, PRESERVATIVE FREE 10 ML: 5 INJECTION INTRAVENOUS at 07:34

## 2022-01-01 RX ADMIN — SODIUM CHLORIDE 1000 ML: 9 INJECTION, SOLUTION INTRAVENOUS at 10:59

## 2022-01-01 RX ADMIN — SODIUM CHLORIDE, PRESERVATIVE FREE 10 ML: 5 INJECTION INTRAVENOUS at 07:55

## 2022-01-01 RX ADMIN — SODIUM CHLORIDE: 9 INJECTION, SOLUTION INTRAVENOUS at 17:59

## 2022-01-01 RX ADMIN — IOPAMIDOL 10 ML: 755 INJECTION, SOLUTION INTRAVENOUS at 15:21

## 2022-01-01 RX ADMIN — SODIUM CHLORIDE 500 MG: 9 INJECTION, SOLUTION INTRAVENOUS at 08:13

## 2022-01-01 RX ADMIN — VASOPRESSIN 2 UNITS: 20 INJECTION INTRAVENOUS at 09:32

## 2022-01-01 RX ADMIN — NITROGLYCERIN 10 MCG/MIN: 20 INJECTION INTRAVENOUS at 12:38

## 2022-01-01 RX ADMIN — ALBUMIN (HUMAN) 25 G: 0.25 INJECTION, SOLUTION INTRAVENOUS at 23:38

## 2022-01-01 RX ADMIN — SODIUM BICARBONATE 50 MEQ: 84 INJECTION, SOLUTION INTRAVENOUS at 10:07

## 2022-01-01 RX ADMIN — ALBUMIN (HUMAN) 25 G: 0.25 INJECTION, SOLUTION INTRAVENOUS at 07:01

## 2022-01-01 RX ADMIN — HEPARIN SODIUM 7000 UNITS: 1000 INJECTION, SOLUTION INTRAVENOUS; SUBCUTANEOUS at 08:34

## 2022-01-01 RX ADMIN — FENTANYL CITRATE 25 MCG: 50 INJECTION, SOLUTION INTRAMUSCULAR; INTRAVENOUS at 11:23

## 2022-01-01 RX ADMIN — EPINEPHRINE 0.1 MG: 1 INJECTION, SOLUTION, CONCENTRATE INTRAVENOUS at 10:42

## 2022-01-01 RX ADMIN — METOPROLOL SUCCINATE 25 MG: 25 TABLET, EXTENDED RELEASE ORAL at 08:46

## 2022-01-01 RX ADMIN — Medication 5 MCG/MIN: at 10:20

## 2022-01-01 RX ADMIN — DEXTROSE MONOHYDRATE 5 MG/HR: 50 INJECTION, SOLUTION INTRAVENOUS at 13:46

## 2022-01-01 RX ADMIN — FENTANYL CITRATE 200 MCG: 50 INJECTION, SOLUTION INTRAMUSCULAR; INTRAVENOUS at 07:34

## 2022-01-01 RX ADMIN — CALCIUM GLUCONATE 1000 MG: 10 INJECTION, SOLUTION INTRAVENOUS at 06:10

## 2022-01-01 RX ADMIN — VASOPRESSIN 1 UNITS: 20 INJECTION INTRAVENOUS at 08:08

## 2022-01-01 RX ADMIN — IOPAMIDOL 80 ML: 755 INJECTION, SOLUTION INTRAVENOUS at 14:35

## 2022-01-01 RX ADMIN — ROCURONIUM BROMIDE 50 MG: 10 INJECTION, SOLUTION INTRAVENOUS at 07:34

## 2022-01-01 RX ADMIN — MIDAZOLAM HYDROCHLORIDE 2 MG: 1 INJECTION INTRAMUSCULAR; INTRAVENOUS at 11:19

## 2022-01-01 RX ADMIN — EPINEPHRINE 0.2 MG: 1 INJECTION, SOLUTION, CONCENTRATE INTRAVENOUS at 10:57

## 2022-01-01 RX ADMIN — SODIUM CHLORIDE: 9 INJECTION, SOLUTION INTRAVENOUS at 08:14

## 2022-01-01 RX ADMIN — CALCIUM GLUCONATE 1000 MG: 10 INJECTION, SOLUTION INTRAVENOUS at 09:11

## 2022-01-01 RX ADMIN — HEPARIN SODIUM 2140 UNITS: 1000 INJECTION, SOLUTION INTRAVENOUS; SUBCUTANEOUS at 23:55

## 2022-01-01 RX ADMIN — SODIUM CHLORIDE: 9 INJECTION, SOLUTION INTRAVENOUS at 23:23

## 2022-01-01 RX ADMIN — NITROGLYCERIN 50 MCG: 20 INJECTION INTRAVENOUS at 09:45

## 2022-01-01 RX ADMIN — Medication 2 MG/HR: at 16:59

## 2022-01-01 RX ADMIN — NITROGLYCERIN 5 MCG/MIN: 20 INJECTION INTRAVENOUS at 22:34

## 2022-01-01 RX ADMIN — HEPARIN SODIUM 2140 UNITS: 1000 INJECTION, SOLUTION INTRAVENOUS; SUBCUTANEOUS at 15:51

## 2022-01-01 RX ADMIN — BARIUM SULFATE 100 ML: 0.81 POWDER, FOR SUSPENSION ORAL at 08:53

## 2022-01-01 RX ADMIN — CALCIUM GLUCONATE 2000 MG: 20 INJECTION, SOLUTION INTRAVENOUS at 19:17

## 2022-01-01 RX ADMIN — SODIUM CHLORIDE, PRESERVATIVE FREE 10 ML: 5 INJECTION INTRAVENOUS at 08:52

## 2022-01-01 RX ADMIN — SODIUM CHLORIDE, PRESERVATIVE FREE 10 ML: 5 INJECTION INTRAVENOUS at 20:19

## 2022-01-01 RX ADMIN — EPINEPHRINE 0.1 MG: 1 INJECTION, SOLUTION, CONCENTRATE INTRAVENOUS at 10:48

## 2022-01-01 ASSESSMENT — PAIN SCALES - GENERAL
PAINLEVEL_OUTOF10: 0
PAINLEVEL_OUTOF10: 7
PAINLEVEL_OUTOF10: 0
PAINLEVEL_OUTOF10: 7
PAINLEVEL_OUTOF10: 0
PAINLEVEL_OUTOF10: 7
PAINLEVEL_OUTOF10: 0
PAINLEVEL_OUTOF10: 0
PAINLEVEL_OUTOF10: 7

## 2022-01-01 ASSESSMENT — ENCOUNTER SYMPTOMS
GASTROINTESTINAL NEGATIVE: 1
ABDOMINAL DISTENTION: 0
SHORTNESS OF BREATH: 0
EYES NEGATIVE: 1
WHEEZING: 0
COUGH: 0
ABDOMINAL PAIN: 0
RESPIRATORY NEGATIVE: 1

## 2022-01-01 ASSESSMENT — LIFESTYLE VARIABLES
HOW OFTEN DO YOU HAVE A DRINK CONTAINING ALCOHOL: NEVER
HOW MANY STANDARD DRINKS CONTAINING ALCOHOL DO YOU HAVE ON A TYPICAL DAY: PATIENT DOES NOT DRINK

## 2022-01-01 ASSESSMENT — PULMONARY FUNCTION TESTS
PIF_VALUE: 21
PIF_VALUE: 19
PIF_VALUE: 18
PIF_VALUE: 19
PIF_VALUE: 19
PIF_VALUE: 17
PIF_VALUE: 19
PIF_VALUE: 21
PIF_VALUE: 18
PIF_VALUE: 15
PIF_VALUE: 18
PIF_VALUE: 23
PIF_VALUE: 25
PIF_VALUE: 21
PIF_VALUE: 21
PIF_VALUE: 25
PIF_VALUE: 21
PIF_VALUE: 18
PIF_VALUE: 21
PIF_VALUE: 15
PIF_VALUE: 27
PIF_VALUE: 19
PIF_VALUE: 18
PIF_VALUE: 25

## 2022-01-01 ASSESSMENT — PAIN - FUNCTIONAL ASSESSMENT
PAIN_FUNCTIONAL_ASSESSMENT: NONE - DENIES PAIN
PAIN_FUNCTIONAL_ASSESSMENT: 0-10

## 2022-05-25 PROBLEM — E11.9 DIABETES MELLITUS (HCC): Status: ACTIVE | Noted: 2022-01-01

## 2022-05-25 PROBLEM — R55 NEAR SYNCOPE: Status: ACTIVE | Noted: 2022-01-01

## 2022-05-25 PROBLEM — R79.89 PRERENAL AZOTEMIA: Status: ACTIVE | Noted: 2022-01-01

## 2022-05-25 PROBLEM — I10 HYPERTENSION: Status: ACTIVE | Noted: 2022-01-01

## 2022-05-25 PROBLEM — E03.9 HYPOTHYROIDISM: Status: ACTIVE | Noted: 2022-01-01

## 2022-05-25 NOTE — ED PROVIDER NOTES
[facility]      Pt Name: Andrea Cesar  MRN: [id number]  [place] 1940  Date of evaluation: 5/25/2022  Provider: Chase Coffey MD    [address] [place] [address]     Chief Complaint   Patient presents with    Dizziness     on and off for a couple months; worsened last evening    Weight Loss     lost weight in the past year         HISTORY OF PRESENT ILLNESS   (Location/Symptom, Timing/Onset, Context/Setting,Quality, Duration, Modifying Factors, Severity)  Note limiting factors. Andrea Cesar is a80 y.o. female who presents to the emergency department      This is a pleasant 80years old female presented ER complaining of \"spells\". She reports that she had a for spell about 2 months ago and she is continue to have on and off spells since then. She had one last night. She reports that she got out of bed went to the kitchen to get water pitcher ready for the night placed on ice in the pitcher and when she bent over to pick that up she got dizzy and fell without hurting herself particular without striking her head on the ground. She is here because she is concerned about that. She has history diabetes sounds like this fairly well controlled also history of hypothyroidism. She thinks that she is hydrated but she does take hydrochlorothiazide for lower extremity edema. She denies any headaches, chest pain, but she does admit to some occasional shortness of breath. She denies any heart problems or lung problems. She does not smoke nor drink. She denies any fever, chills, sweats, nausea, vomiting, diarrhea or any urinary symptoms. Nursing Notes werereviewed. REVIEW OF SYSTEMS    (2-9 systems for level 4, 10 or more for level 5)     Review of Systems   Constitutional: Positive for activity change and fatigue. HENT: Negative. Eyes: Negative. Respiratory: Negative. Cardiovascular: Negative. Gastrointestinal: Negative. Endocrine: Negative. Genitourinary: Negative. Musculoskeletal: Negative. Skin: Negative. Neurological: Negative. Hematological: Negative. Psychiatric/Behavioral: Negative. Except as noted above the remainder of the review of systems was reviewed and negative. PAST MEDICAL HISTORY     Past Medical History:   Diagnosis Date    Diabetes mellitus ([address]     Hypertension     Hypothyroidism          SURGICALHISTORY     History reviewed. No pertinent surgical history. CURRENT MEDICATIONS       Current Discharge Medication List      CONTINUE these medications which have NOT CHANGED    Details   levothyroxine (SYNTHROID) 100 MCG tablet Take 100 mcg by mouth Daily      triamterene-hydroCHLOROthiazide (DYAZIDE) 37.5-25 MG per capsule Take 1 capsule by mouth daily as needed (swelling)      meclizine (BONINE) 25 MG CHEW Take 12.5 mg by mouth 3 times daily as needed (dizziness)      acetaminophen (TYLENOL) 325 mg tablet Take 650 mg by mouth every 6 hours as needed for Pain      Multiple Vitamins-Minerals (VISION VITAMINS PO) Take 1 tablet by mouth daily Vision Multi 50+                  Milk-related compounds    FAMILY HISTORY     History reviewed. No pertinent family history.        SOCIAL HISTORY       Social History     Socioeconomic History    Marital status:      Spouse name: None    Number of children: None    Years of education: None    Highest education level: None   Occupational History    None   Tobacco Use    Smoking status: Passive Smoke Exposure - Never Smoker    Smokeless tobacco: Never Used   Vaping Use    Vaping Use: Never used   Substance and Sexual Activity    Alcohol use: Not Currently    Drug use: Never    Sexual activity: Never   Other Topics Concern    None   Social History Narrative    None     Social Determinants of Health     Financial Resource Strain:     Difficulty of Paying Living Expenses: Not on file   Food Insecurity:     Worried About Running Out of Food in the discharge. Left eye: No discharge. Pupils: Pupils are equal, round, and reactive to light. Neck:      Vascular: No carotid bruit. Cardiovascular:      Rate and Rhythm: Normal rate and regular rhythm. Pulses: Normal pulses. Heart sounds: Normal heart sounds. No murmur heard. No friction rub. No gallop. Pulmonary:      Effort: Pulmonary effort is normal. No respiratory distress. Breath sounds: Normal breath sounds. No stridor. No wheezing, rhonchi or rales. Chest:      Chest wall: No tenderness. Abdominal:      General: Abdomen is flat. Musculoskeletal:         General: Normal range of motion. Cervical back: Normal range of motion and neck supple. No rigidity or tenderness. Lymphadenopathy:      Cervical: No cervical adenopathy. Skin:     General: Skin is warm. Capillary Refill: Capillary refill takes less than 2 seconds. Neurological:      General: No focal deficit present. Mental Status: She is alert and oriented to person, place, and time. Cranial Nerves: No cranial nerve deficit. Sensory: No sensory deficit. Motor: No weakness.       Coordination: Coordination normal.      Gait: Gait normal.      Deep Tendon Reflexes: Reflexes normal.   Psychiatric:         Mood and Affect: Mood normal.         Behavior: Behavior normal.         DIAGNOSTIC RESULTS     EKG: All EKG's are interpreted by the Emergency Department Physician who either signs orCo-signs this chart in the absence of a cardiologist.        RADIOLOGY:   plain film images such as CT, Ultrasound and MRI are read by the radiologist. Plain radiographic images are visualized and preliminarily interpreted by the emergency physician with the below findings:        Interpretation per the Radiologist below, ifavailable at the time of this note:    No orders to display         ED BEDSIDE ULTRASOUND:   Performed by ED Physician - none    LABS:  Labs Reviewed   CBC WITH AUTO DIFFERENTIAL - Abnormal; Notable for the following components:       Result Value    RBC 3.83 (*)     Hemoglobin 10.7 (*)     Hematocrit 33.5 (*)     Seg Neutrophils 73 (*)     Lymphocytes 18 (*)     Eosinophils % 0 (*)     All other components within normal limits   COMPREHENSIVE METABOLIC PANEL - Abnormal; Notable for the following components:    Glucose 162 (*)     CREATININE 1.29 (*)     Sodium 131 (*)     Chloride 95 (*)     GFR Non- 40 (*)     GFR  48 (*)     All other components within normal limits   TROPONIN - Abnormal; Notable for the following components:    Troponin, High Sensitivity 19 (*)     All other components within normal limits   URINALYSIS WITH REFLEX TO CULTURE - Abnormal; Notable for the following components:    Specific Gravity, UA <1.005 (*)     All other components within normal limits   TROPONIN - Abnormal; Notable for the following components:    Troponin, High Sensitivity 19 (*)     All other components within normal limits   TROPONIN - Abnormal; Notable for the following components:    Troponin, High Sensitivity 23 (*)     All other components within normal limits   MICROSCOPIC URINALYSIS   TSH   T4, FREE   BASIC METABOLIC PANEL W/ REFLEX TO MG FOR LOW K   CBC WITH AUTO DIFFERENTIAL   TROPONIN       All other labs were within normal range ornot returned as of this dictation. EMERGENCY DEPARTMENT COURSE and DIFFERENTIAL DIAGNOSIS/MDM:   Vitals:    Vitals:    05/25/22 1629 05/25/22 1630 05/25/22 1720 05/25/22 1910   BP:  (!) 162/89 138/81 130/70   Pulse:  80 82 80   Resp:  16 18 16   Temp:   97.4 °F (36.3 °C) 97.9 °F (36.6 °C)   TempSrc:   Temporal Temporal   SpO2: 98% 99% 98% 96%   Weight:   124 lb 5.4 oz (56.4 kg)    Height:   5' 4\" (1.626 m)            MDM  Number of Diagnoses or Management Options  Diagnosis management comments: Patient with near syncopal episode last night - was found to have an abnormal EKG and elevated troponin.  Decision was made to admit her to the hospital for further evaluation and treatment - likely including - trending troponins and getting an echocardiogram etc. Her heart score is 4. CRITICAL CARE TIME   Total CriticalCare time was  minutes, excluding separately reportable procedures. There was a high probability of clinically significant/life threatening deterioration in the patient's condition which required my urgent intervention. CONSULTS:  IP CONSULT TO CARDIOLOGY    PROCEDURES:  Unlessotherwise noted below, none     Procedures    FINAL IMPRESSION      1. Near syncope    2. Abnormal EKG    3. Elevated troponin          DISPOSITION/PLAN   DISPOSITION        PATIENT REFERRED TO:  No follow-up provider specified.     DISCHARGE MEDICATIONS:  Current Discharge Medication List                 (Please note that portions of this note were completed with a voice recognition program.  Efforts were made to edit the dictations but occasionally words are mis-transcribed.)      Sofia Archibald MD (electronically signed)  Attending Emergency Physician            Sofia Archibald MD  05/25/22 [address], [place]  05/25/22 2430

## 2022-05-25 NOTE — ED NOTES
Dr. Ayala Collazo returned call. Currently speaking with Dr. Feliz Lopez.      [facility] ([place], [place])cyndi Overall A Reser  05/25/22 9922

## 2022-05-25 NOTE — ED NOTES
Waiting for Dr. Blake Bennett as hospitalist per Dr. Davie Peres request.     Zachery Garber Reser  05/25/22 5878

## 2022-05-26 PROBLEM — I35.0 AORTIC STENOSIS, SEVERE: Status: ACTIVE | Noted: 2022-01-01

## 2022-05-26 PROBLEM — I42.0 DILATED CARDIOMYOPATHY (HCC): Status: ACTIVE | Noted: 2022-01-01

## 2022-05-26 PROBLEM — I27.20 SEVERE PULMONARY HYPERTENSION (HCC): Status: ACTIVE | Noted: 2022-01-01

## 2022-05-26 PROBLEM — I07.1 MILD TRICUSPID VALVE REGURGITATION: Status: ACTIVE | Noted: 2022-01-01

## 2022-05-26 PROBLEM — I05.0 MILD MITRAL STENOSIS: Status: ACTIVE | Noted: 2022-01-01

## 2022-05-26 PROBLEM — N17.9 ACUTE KIDNEY INJURY SUPERIMPOSED ON CKD (HCC): Status: ACTIVE | Noted: 2022-01-01

## 2022-05-26 PROBLEM — N18.9 ACUTE KIDNEY INJURY SUPERIMPOSED ON CKD (HCC): Status: ACTIVE | Noted: 2022-01-01

## 2022-05-26 NOTE — ACP (ADVANCE CARE PLANNING)
Advance Care Planning     Advance Care Planning Activator (Inpatient)  Conversation Note      Date of ACP Conversation: 5/26/2022     Conversation Conducted with: Patient with Decision Making Capacity    ACP Activator: Lashon Khan RN        Health Care Decision Maker:     Current Designated Health Care Decision Maker:     Primary Decision Maker: Phyliciacyndi Óscar - Brother/Sister - [phone]    Care Preferences    Ventilation: \"If you were in your present state of health and suddenly became very ill and were unable to breathe on your own, what would your preference be about the use of a ventilator (breathing machine) if it were available to you? \"      Would the patient desire the use of ventilator (breathing machine)?: no    \"If your health worsens and it becomes clear that your chance of recovery is unlikely, what would your preference be about the use of a ventilator (breathing machine) if it were available to you? \"     Would the patient desire the use of ventilator (breathing machine)?: No      Resuscitation  \"CPR works best to restart the heart when there is a sudden event, like a heart attack, in someone who is otherwise healthy. Unfortunately, CPR does not typically restart the heart for people who have serious health conditions or who are very sick. \"    \"In the event your heart stopped as a result of an underlying serious health condition, would you want attempts to be made to restart your heart (answer \"yes\" for attempt to resuscitate) or would you prefer a natural death (answer \"no\" for do not attempt to resuscitate)? \" no       [] Yes   [] No   Educated Patient / Italia Richey regarding differences between Advance Directives and portable DNR orders.     Length of ACP Conversation in minutes:  15    Conversation Outcomes:  [x] ACP discussion completed  [] Existing advance directive reviewed with patient; no changes to patient's previously recorded wishes  [x] New Advance Directive completed  [x] Portable Do Not Rescitate prepared for Provider review and signature  [] POLST/POST/MOLST/MOST prepared for Provider review and signature      Follow-up plan:    [] Schedule follow-up conversation to continue planning  [x] Referred individual to Provider for additional questions/concerns   [] Advised patient/agent/surrogate to review completed ACP document and update if needed with changes in condition, patient preferences or care setting    [x] This note routed to one or more involved healthcare providers    [address] [place] [address] and [place] Nurse Coordinator  5/26/2022 1:19 PM

## 2022-05-26 NOTE — PROGRESS NOTES
Occupational Therapy  Facility/Department: [address] [place] [address]  Occupational Therapy Initial Assessment    Name: Rosibel Jeffrey  : 1940  MRN: [id number]  Date of Service: 2022    Discharge Recommendations:   home c assist as needed          Patient Diagnosis(es): The primary encounter diagnosis was Near syncope. Diagnoses of Abnormal EKG and Elevated troponin were also pertinent to this visit. Past Medical History:  has a past medical history of Diabetes mellitus ([address] Hypertension, and Hypothyroidism. Past Surgical History:  has no past surgical history on file. Assessment   Assessment: Patient admitted to [facility] for observation d/t syncope c fall s injury. Patient demonstrates good safety and mod I to indep during ADL. No further OT services needed at this time. Prognosis: Good  Decision Making: Medium Complexity  No Skilled OT: Independent with ADL's  REQUIRES OT FOLLOW-UP: No        Plan   Plan  Plan Comment: No further OT services     Restrictions       Subjective   Subjective  Subjective: Patient denies pain, agreeable to OT evaluation. patient states that she hopes to be d/c later today. Patient states she feels silly even being in hospital.  General Comment  Comments: Patient supine in bed upon arrival.     Social/Functional History  Social/Functional History  Lives With: Family  Type of Home: House  Home Layout: One level  Home Access: Level entry  Bathroom Shower/Tub: Walk-in shower  Bathroom Toilet: Handicap height  Bathroom Equipment: Grab bars in shower  Has the patient had two or more falls in the past year or any fall with injury in the past year?: No  ADL Assistance: [address]: Independent  Homemaking Responsibilities: Yes  Ambulation Assistance: Independent  Transfer Assistance: Independent  Active : Yes  Additional Comments: Patient reports indep PTA with no issues.        Objective   Heart Rate: 77  Heart Rate Source: Monitor  BP: 123/75  BP Location: Left upper arm  Patient Position: Semi fowlers  MAP (Calculated): 91  Resp: 16  SpO2: 94 %  O2 Device: None (Room air)  Vision Exceptions: Wears glasses at all times  Hearing: Exceptions to [employer]  Hearing Exceptions: Bilateral hearing aid          Safety Devices  Type of Devices: Left in bed;Nurse notified  Balance  Sitting: Intact  Standing: Intact  Gait  Overall Level of Assistance: Modified independent  Stairs - Level of Assistance: Other (comment) (IV pole)     AROM: Within functional limits  PROM: Within functional limits  Strength: Generally decreased, functional  Coordination: Within functional limits  Tone: Normal  Sensation: Intact  ADL  Feeding: Independent  Grooming: Setup;Modified independent   UE Bathing: Setup;Modified independent   LE Bathing: Setup;Modified independent   UE Dressing: Setup;Modified independent   LE Dressing: Setup;Modified independent   Toileting: Setup;Modified independent            Transfers  Stand Step Transfers: Independent  Sit to stand: Independent  Stand to sit: Independent     Cognition  Overall Cognitive Status: WFL                  Education Given To: Patient  Education Provided: Role of Therapy;IADL Safety  Education Provided Comments: Edu on carrying cell phone vs. life alert in instance of syncope/dizziness  Education Method: Verbal  Barriers to Learning: None  Education Outcome: Verbalized understanding             AM-PAC Score        AM-[facility] Inpatient Daily Activity Raw Score: 24 (05/26/22 0929)  AM-PAC Inpatient ADL T-Scale Score : 57.54 (05/26/22 0929)  ADL Inpatient CMS 0-100% Score: 0 (05/26/22 0929)  ADL Inpatient CMS G-Code Modifier : [facility] (05/26/22 1206)    Goals  Short Term Goals  Time Frame for Short term goals: 1 visit  Short Term Goal 1: Patient to demo mod I to indep c self care.        Therapy Time   Individual Concurrent Group Co-treatment   Time In 0827         Time Out 0995         Minutes 10                 Bong Jay OTR/L

## 2022-05-26 NOTE — PLAN OF CARE
Problem: Discharge Planning  Goal: Discharge to home or other facility with appropriate resources  Outcome: Progressing  Note: Patient is here for observation. Patient is doing well. Safety precautions taken.

## 2022-05-26 NOTE — PROGRESS NOTES
Writer assisted patient to the bathroom. Patient tolerated well and was steady on her feet. Assessment and vitals as charted. Patient continues to deny any pain at this time and denies any other needs. Bed alarm is on, gripper socks on, bed is locked, and call light is within reach in which patient can use appropriately. Writer will continue to monitor throughout this shift.

## 2022-05-26 NOTE — PROGRESS NOTES
Comprehensive Nutrition Assessment    Type and Reason for Visit:  Initial,Positive Nutrition Screen (MST 1)    Nutrition Recommendations/Plan:   1. Continue current diet. Malnutrition Assessment:  Malnutrition Status:  Insufficient data (05/26/22 1140)    Context:  Acute Illness     Findings of the 6 clinical characteristics of malnutrition:  Energy Intake:  Unable to assess  Weight Loss:  Unable to assess     Body Fat Loss:  Unable to assess     Muscle Mass Loss:  Unable to assess    Fluid Accumulation:  No significant fluid accumulation     Strength:  Not Performed    Nutrition Assessment:    No significant PES. Pt with cardiology at attempted visit. Glucose 117. Noted report of weight loss over the past year, but unsure of how many pounds. Nutrition Related Findings:    unable to assess Wound Type: None       Current Nutrition Intake & Therapies:    Average Meal Intake: 51-75%  Average Supplements Intake: None Ordered  ADULT DIET; Regular    Anthropometric Measures:  Height: 5' 4\" (162.6 cm)  Ideal Body Weight (IBW): 120 lbs (55 kg)    Admission Body Weight: 124 lb 5 oz (56.4 kg)  Current Body Weight: 122 lb 12.7 oz (55.7 kg), 102.3 % IBW.  Weight Source: Bed Scale  Current BMI (kg/m2): 21.1  Usual Body Weight:  (unknown, limited historical data)     Weight Adjustment For: No Adjustment                 BMI Categories: Normal Weight (BMI 22.0 to 24.9) age over 72      Nutrition Diagnosis:   · No nutrition diagnosis at this time related to   as evidenced by        Nutrition Interventions:   Food and/or Nutrient Delivery: Continue Current Diet  Nutrition Education/Counseling: No recommendation at this time  Coordination of Nutrition Care: Continue to monitor while inpatient       Goals:     Goals: PO intake 75% or greater      Recent Labs     05/25/22  1004 05/26/22  0520   * 133*   K 4.4 4.1   CL 95* 100   CO2 24 24   BUN 18 20   CREATININE 1.29* 1.53*   GLUCOSE 162* 117*   ALT 13  --    ALKPHOS 69 --    GFR                            Lab Results   Component Value Date    LABALBU 4.2 05/25/2022        Nutrition Monitoring and Evaluation:   Behavioral-Environmental Outcomes: None Identified  Food/Nutrient Intake Outcomes: Food and Nutrient Intake  Physical Signs/Symptoms Outcomes: Biochemical Data,Weight    Discharge Planning:    Continue current diet     Rufus Sen RD, LD  Contact: [phone]

## 2022-05-26 NOTE — PROGRESS NOTES
Call placed to cardiology office to ensure they received consult info faxed during the night. Spoke with Durga Puentes, she confirms they are aware of consult. Will continue to monitor.

## 2022-05-26 NOTE — CONSULTS
Fuentes Campos am scribing for and in the presence of Shelby Zacarias MD, MS, F.A.C.C..    Patient: Zohaib Bell  : 1940  Date of Admission: 2022  Primary Care Physician: No primary care provider on file. Today's Date: 2022    REASON FOR CONSULTATION: Dizziness (on and off for a couple months; worsened last evening) and Weight Loss (lost weight in the past year)      HPI: Ms. Anayeli Almanzar is a 80 y.o. female who was admitted to the hospital with a syncopal episode leading to this consultation. This has been an on and off issue for her for the last 8 years or so. Her last episode was about a year ago. She denies ever being told herself having heart problems. Her mother had heart disease, CHF towards end of life, and a sister had valve problems and surgery. She denies history of hypertension or hyperlipidemia. She had diabetes for 20 years. In her chart she has the diagnosis of both hypertension and hyperlipidemia. Ms. Anayeli Almanzar was admitted for a fall after being lightheaded/dizzy. She Reports having dizziness for years on and off. She states her last syncope spell was in 2019. She had a total of 3 syncopal episodes in her lifetime, all later in life. She has lightheaded/dizziness a couple of times a month. Sitting down makes it better. During her syncopal episodes she only remembers saying she was not feeling well and thought it was due to diabetes and having low sugar. Some shortness of breath occasionally. She states this has been mildly worse over the past couple of months. She does have random chest pain that lasts just minutes at a time. She does have Hctz as needed for leg swelling, she states her legs only swell when she eats too much salt. . Denies stomach pain, vomiting she does have nausea occasionally. She admits not drinking enough fluids. She does try to drink 8-10 ounces daily, she drinks coffee occasionally and pop.      Ms. Anayeli Almanzar also denied any current or recent chest pain, shortness of breath, abdominal pain, bleeding problems, problems with her medications or any other concerns at this time. Past Medical History:   Diagnosis Date    Diabetes mellitus ([address]     Hypertension     Hypothyroidism        CURRENT ALLERGIES: Milk-related compounds REVIEW OF SYSTEMS: 14 systems were reviewed. Pertinent positives and negatives as above, all else negative. History reviewed. No pertinent surgical history. Social History:  Social History     Tobacco Use    Smoking status: Passive Smoke Exposure - Never Smoker    Smokeless tobacco: Never Used   Vaping Use    Vaping Use: Never used   Substance Use Topics    Alcohol use: Not Currently    Drug use: Never        MEDICATIONS:  levothyroxine (SYNTHROID) tablet 100 mcg, Daily  meclizine (ANTIVERT) tablet 12.5 mg, TID PRN  0.9 % sodium chloride infusion, Continuous  sodium chloride flush 0.9 % injection 5-40 mL, 2 times per day  sodium chloride flush 0.9 % injection 5-40 mL, PRN  0.9 % sodium chloride infusion, PRN  enoxaparin Sodium (LOVENOX) injection 30 mg, Daily  ondansetron (ZOFRAN-ODT) disintegrating tablet 4 mg, Q8H PRN   Or  ondansetron (ZOFRAN) injection 4 mg, Q6H PRN  polyethylene glycol (GLYCOLAX) packet 17 g, Daily PRN  acetaminophen (TYLENOL) tablet 650 mg, Q6H PRN   Or  acetaminophen (TYLENOL) suppository 650 mg, Q6H PRN  labetalol (NORMODYNE;TRANDATE) injection 10 mg, Q6H PRN      CURRENT INPATIENT MEDICATIONS:  Prior to Admission medications    Medication Sig Start Date End Date Taking?  Authorizing Provider   levothyroxine (SYNTHROID) 100 MCG tablet Take 100 mcg by mouth Daily   Yes Historical Provider, MD   triamterene-hydroCHLOROthiazide (DYAZIDE) 37.5-25 MG per capsule Take 1 capsule by mouth daily as needed (swelling)   Yes Historical Provider, MD   meclizine (BONINE) 25 MG CHEW Take 12.5 mg by mouth 3 times daily as needed (dizziness)   Yes Historical Provider, MD   acetaminophen (TYLENOL) 325 mg tablet Take 650 mg by mouth every 6 hours as needed for Pain   Yes Historical Provider, MD   Multiple Vitamins-Minerals (VISION VITAMINS PO) Take 1 tablet by mouth daily Vision Multi 50+   Yes Historical Provider, MD       FAMILY HISTORY: 2 sisters with CAD, 1 with stents and 1 with CABG and 1 with a valve repair in 60's     Physical Examination:     /75   Pulse 77   Temp 97.8 °F (36.6 °C) (Temporal)   Resp 16   Ht 5' 4\" (1.626 m)   Wt 122 lb 12.7 oz (55.7 kg)   SpO2 94%   BMI 21.08 kg/m²  Body mass index is 21.08 kg/m². Constitutional: She appeared oriented to person and place. She appears well-developed and well-nourished. In no acute distress. HEENT: Normocephalic and atraumatic. No JVD present. Carotid bruit is not present. No mass and no thyromegaly present. No lymphadenopathy noted. Cardiovascular: Normal rate, regular rhythm, normal heart sounds. Exam reveals no gallop and no friction rubs. 3/6 systolic murmur, 5th intercostal space on the LEFT in the mid-clavicular line (cardiac apex)  Pulmonary/Chest: Effort normal and breath sounds normal. No respiratory distress. She has no wheezes, rhonchi or rales. Abdominal: Soft, non-tender. She exhibits no organomegaly, mass or bruit. Extremities: None. No cyanosis or clubbing. 2+ radial and carotid pulses. Distal extremity pulses: 2+ bilaterally. Neurological: Alertness and orientation as per Constitutional exam. No evidence of gross cranial nerve deficit. Coordination appeared normal.   Skin: Skin is warm and dry. There is no rash or diaphoresis. Psychiatric: She has a normal mood and affect.  Her speech is normal and behavior is normal.      MOST RECENT LABS ON RECORD:   Lab Results   Component Value Date    WBC 7.3 05/26/2022    HGB 10.3 (L) 05/26/2022    HCT 32.9 (L) 05/26/2022     05/26/2022    ALT 13 05/25/2022    AST 17 05/25/2022     (L) 05/26/2022    K 4.1 05/26/2022     05/26/2022    CREATININE 1.53 (H) 05/26/2022    BUN 20 05/26/2022    CO2 24 05/26/2022    TSH 0.59 05/25/2022     ASSESSMENT:  Patient Active Problem List    Diagnosis Date Noted    Near syncope 05/25/2022    Diabetes mellitus [address] [address] 05/25/2022    Hypertension 05/25/2022    Hypothyroidism 05/25/2022    Probable prerenal azotemia - however no baseline creatinine level in Epic 05/25/2022      PLAN:   Lightheadedness/Dizziness: Unclear etiology but I suspect related to her severe mitral regurgitation as well as idiopathic cardiomyopathy which I suspect is due to ischemic heart disease   · Pharmacological Management: Not indicated             Shortness of breath with mild exertion: Likely due to her severe mitral regurgitation as well as idiopathic cardiomyopathy which I suspect is due to ischemic heart disease     · Hyperlipidemia: Mixed History in her chart, no recent lipid panel done  · Cholesterol Reduction Therapy: Not indicated at this time. · I ordered a lipid panel to be done today. · Severe Mitral Regurgitation: Likely symptomatic with a estimated RVSP of 96 which is severely elevated. Unclear etiology but given low ER this is likely very serious and her options to treat this may be limited  · I also took the liberty of starting her on Toprol XL 25 mg daily and warned her about the potential side effects of lightheadedness and dizziness and told her to call me if this develops. · Start lostartan 25 mg daily     · Idiopathic Cardiomyopathy: Suspect CAD +/- severe mitral regurgitation   For these reasons, I recommended that the patient consider undergoing a cardiac catheterization with coronary angiography to assess their coronary anatomy and facilitate better treatment recommendations. I discussed the risks, benefits, and alternatives to the procedure including the risk of bleeding, contrast induced allergy and/or kidney damage, arrythmia, stroke, heart attack, death, or the need for further procedures.  I also discussed the fact that although treatment with simple medical management is a potential treatment option in place of cardiac catheterization, I expressed my opinion that cardiac catheterization in order to define their coronary anatomy and rule out severe 3 vessel or left main coronary artery disease would significant help guide the most appropriate treatment strategy ranging from no treatment, to medications, stents, to even coronary bypass surgery. The patient verbalized understanding of the risks benefits and alternatives and stated that they would like to undergo the procedure. We will plan to schedule the procedure here at [facility] on June 7th. [place]   ·      Once again, thank you for allowing me to participate in this patients care. Please do not hesitate to contact me if I could be of any further assistance. Sincerely,  Pooja Cantor. Ian STILL, MS, F.A.C.C. [facility] Specialist    [address] [place] [address], [address] [place] [address]  Phone: [phone], Fax: [phone]     I believe that the risk of significant morbidity and mortality related to the patient's current medical conditions are: intermediate-high. The documentation recorded by the scribe, accurately and completely reflects the services I personally performed and the decisions made by me. Esther Cantor.  Rupa Mcleod MD, MS, F.A.C.C. 5/26/2022

## 2022-05-26 NOTE — PROGRESS NOTES
Writer at bedside to complete evening assessment. Upon entry to room, pt awake and in bed, respirations normal and unlabored while on room air. Vitals obtained and assessment completed, see flow sheet for details. Pt denies any needs from writer at this time. Call light in reach. Will continue to monitor.

## 2022-05-26 NOTE — PROGRESS NOTES
Pt resting in bed, call light within reach. A&Ox4, calm and cooperative. Assmt and VS completed as charted, see flow sheet. Pt denies chest pain, chest discomfort or palpitations. IV fluids continue as ordered, see MAR. Pt denies pain, denies further needs. Will continue to monitor.

## 2022-05-26 NOTE — PROGRESS NOTES
Faxed paper work to Dr. Jose Carlos Aldana office so they will become her PCP on discharge.     MATTIE Daily

## 2022-05-26 NOTE — H&P
History and Physical    Patient:  Lina Cerda  MRN: [id number]    Chief Complaint: Dizziness    History Obtained From:  patient, electronic medical record    PCP: No primary care provider on file. History of Present Illness: The patient is a 80 y.o. female who presented to the ER due to \"not feeling right\". She stated 2 days prior she had \"lightheadedness\" but no spinning or syncope, but fell. She has stated that this has occurred several times in the past.  She stated she had no injury at that time. She denied recent illness including fever, chills. She denied n/v/d. She stated she may not be taking enough liquids. She denied headaches or vision changes. During her evaluation she did have elevated BUN and creatinine although no other levels within epic to compare with. Troponins were 23 and 19 and 19. Urinalysis was negative. CBC was negative. Past Medical History:        Diagnosis Date    [address] mellitus [address] [address]     Hypertension     Hypothyroidism        Past Surgical History:    History reviewed. No pertinent surgical history. Medications Prior to Admission:    Prior to Admission medications    Medication Sig Start Date End Date Taking?  Authorizing Provider   levothyroxine (SYNTHROID) 100 MCG tablet Take 100 mcg by mouth Daily   Yes Historical Provider, MD   triamterene-hydroCHLOROthiazide (DYAZIDE) 37.5-25 MG per capsule Take 1 capsule by mouth daily as needed (swelling)   Yes Historical Provider, MD   meclizine (BONINE) 25 MG CHEW Take 12.5 mg by mouth 3 times daily as needed (dizziness)   Yes Historical Provider, MD   acetaminophen (TYLENOL) 325 mg tablet Take 650 mg by mouth every 6 hours as needed for Pain   Yes Historical Provider, MD   Multiple Vitamins-Minerals (VISION VITAMINS PO) Take 1 tablet by mouth daily Vision Multi 50+   Yes Historical Provider, MD       Allergies:  Milk-related compounds    Social History:   TOBACCO:   reports that she is a non-smoker but has been exposed to tobacco smoke. She has never used smokeless tobacco.  ETOH:   reports previous alcohol use. Family History:   History reviewed. No pertinent family history. Allergies:  Milk-related compounds    Medications Prior to Admission:    Prior to Admission medications    Medication Sig Start Date End Date Taking? Authorizing Provider   levothyroxine (SYNTHROID) 100 MCG tablet Take 100 mcg by mouth Daily   Yes Historical Provider, MD   triamterene-hydroCHLOROthiazide (DYAZIDE) 37.5-25 MG per capsule Take 1 capsule by mouth daily as needed (swelling)   Yes Historical Provider, MD   meclizine (BONINE) 25 MG CHEW Take 12.5 mg by mouth 3 times daily as needed (dizziness)   Yes Historical Provider, MD   acetaminophen (TYLENOL) 325 mg tablet Take 650 mg by mouth every 6 hours as needed for Pain   Yes Historical Provider, MD   Multiple Vitamins-Minerals (VISION VITAMINS PO) Take 1 tablet by mouth daily Vision Multi 50+   Yes Historical Provider, MD       Review of Systems:  Constitutional:negative  for fevers, and negative for chills. Eyes: negative for visual disturbance   ENT: negative for sore throat, negative nasal congestion, and negative for earache  Respiratory: negative for shortness of breath, negative for cough, and negative for wheezing  Cardiovascular: negative for chest pain, negative for palpitations, and negative for syncope  Gastrointestinal: negative for abdominal pain, negative for nausea,negative for vomiting, negative for diarrhea, negative for constipation, and negative for hematochezia or melena  Genitourinary: negative for dysuria, negative for urinary urgency, negative for urinary frequency, and negative for hematuria  Skin: negative for skin rash, and negative for skin lesions  Neurological: negative for unilateral weakness, numbness or tingling.  Positive lightheadedness    Physical Exam:    Vitals:   Temp: 97.8 °F (36.6 °C)  BP: 123/75  Resp: 16  Heart Rate: 77  SpO2: 94 %  24HR INTAKE/OUTPUT: Intake/Output Summary (Last 24 hours) at 5/26/2022 0760  Last data filed at 5/26/2022 0502  Gross per 24 hour   Intake 1690 ml   Output 1000 ml   Net 690 ml       Weight    Body mass index is 21.08 kg/m². Exam:  GEN:    Awake, alert and oriented x3. EYES:  EOMI, pupils equal   NECK: Supple. No lymphadenopathy. No carotid bruit  CVS:    regular rate and rhythm, systolic murmur  PULM:  CTA, no wheezes, rales or rhonchi, no acute respiratory distress  ABD:    Bowels sounds normal.  Abdomen is soft. No distention. no tenderness to palpation. EXT:   no edema bilaterally . No calf tenderness. NEURO: Moves all extremities. Motor and sensory are grossly intact  SKIN:  No rashes.   No skin lesions.    -----------------------------------------------------------------  Diagnostic Data:     DATA:    CBC:   Lab Results   Component Value Date    WBC 7.3 05/26/2022    RBC 3.73 (L) 05/26/2022    HGB 10.3 (L) 05/26/2022    HCT 32.9 (L) 05/26/2022    MCV 88.2 05/26/2022     05/26/2022        CMP:   Lab Results   Component Value Date    GLUCOSE 117 (H) 05/26/2022    BUN 20 05/26/2022    CREATININE 1.53 (H) 05/26/2022     (L) 05/26/2022    K 4.1 05/26/2022    CALCIUM 8.7 05/26/2022     05/26/2022    CO2 24 05/26/2022    PROT 6.9 05/25/2022    LABALBU 4.2 05/25/2022    BILITOT 0.38 05/25/2022    ALKPHOS 69 05/25/2022    ALT 13 05/25/2022    AST 17 05/25/2022       UA:   Lab Results   Component Value Date    COLORU Yellow 05/25/2022    SPECGRAV <1.005 (L) 05/25/2022    WBCUA 0 TO 2 05/25/2022    RBCUA 0 TO 2 05/25/2022    EPITHUA 0 TO 2 05/25/2022    LEUKOCYTESUR NEGATIVE 05/25/2022    GLUCOSEU NEGATIVE 05/25/2022    KETUA NEGATIVE 05/25/2022    PROTEINU NEGATIVE 05/25/2022    HGBUR NEGATIVE 05/25/2022       Lactic Acid:   No results found for: LACTA    D-Dimer:  No results found for: DDIMER    PT/INR:  No results found for: PROTIME, INR    High Sensitivity Troponin:  Recent Labs     05/25/22  1210 05/25/22  1900 05/26/22  0520   TROPHS 19* 23* 21*       ABGs:   No results found for: PHART, PH, RIP1BVM, PCO2, PO2ART, PO2, KTF7OGU, HCO3, BEART, BE, THGBART, THB, ZHL2GJS, S9COZQOG, O2SAT, FIO2        No orders to display         EKG reviewed        Assessment:    Principal Problem:    Near syncope  Active Problems:    Diabetes mellitus (HCC)    Hypertension    Hypothyroidism    Probable prerenal azotemia - however no baseline creatinine level in Epic  Resolved Problems:    * No resolved hospital problems. *      Patient Active Problem List    Diagnosis Date Noted    Near syncope 05/25/2022    Diabetes mellitus ([facility] [address] 05/25/2022    Hypertension 05/25/2022    Hypothyroidism 05/25/2022    Probable prerenal azotemia - however no baseline creatinine level in Epic 05/25/2022       Plan:     · This patient requires overnight observation because of near syncope  · Factors affecting the medical complexity of this patient include type 2 diabetes, hypertension, probable prerenal azotemia however no baseline creatinine in epic  · Estimated length of stay is 1 days  · Near syncope  · Appreciate cardiology  · IV fluids  · Orthostatic vital signs-negative  · Echocardiogram  · JACK hose  · Ambulate  · Type 2 diabetes  · Diet controlled  · Hypertension  · Hold Dyazide  · Probable prerenal azotemia however no baseline creatinine in epic  · Patient does report history of kidney disease  · DVT prophylaxis: Lovenox  · Peptic ulcer prophylaxis: Pepcid  · High risk medications: none  · Social Service and Case Management consults for DC planning  · Dietician consult initiated    CORE MEASURES  DVT prophylaxis: Lovenox  Decubitus ulcer present on admission: No  CODE STATUS: FULL CODE  Nutrition Status: good   Physical therapy: Yes   Old Charts reviewed: Yes  EKG Reviewed:  Yes  Advance Directive Addressed: Yes    SHANNON Solis - CNP, APRN, NP-C  5/26/2022, 7:47 AM

## 2022-05-26 NOTE — CONSULTS
Palliative Care     NAME:  Joslyn Alaniz RECORD NUMBER:  [id number]  AGE: 80 y.o. GENDER: female  : 1940  TODAY'S DATE:  2022    Active Hospital Problems    Diagnosis Date Noted    Near syncope [R55] 2022     Priority: Medium    Diabetes mellitus ([facility] [address] [E11.9] 2022     Priority: Medium    Hypertension [I10] 2022     Priority: Medium    Hypothyroidism [E03.9] 2022     Priority: Medium    Probable prerenal azotemia - however no baseline creatinine level in Epic [R79.89] 2022     Priority: Medium       Data        Code Status: Full Code     ADVANCED CARE PLANNING:  Patient has capacity for medical decisions: yes  Health Care Power of : yes  Living Will: yes     Personal, Social, and Family History  Marital Status:   Living situation:alone  Kari/Spiritual History:   Taoism  Psychological Distress: denies  Does patient understand diagnosis/treatment? yes  Does family/caregiver understand diagnosis/treatment? not asked    Pain Management:  The patient is not having any pain. Symptom Management:  Are there any other symptoms that are distressing to the patient or family that needs addressed? Anxiety:  none  Dyspnea:  none  Fatigue:  none  Bladder function:  n/a  Bowel function:  none  Other:  n/a     Plan        Palliative Interaction: I was asked to see patient per  to complete advanced directives. I introduce myself to the patient, and my role in her care. We complete the [facility], guardianship and Living Will. We discuss code status and patient would like to be a DNR-CC after I explain the 3 levels to her. I ask her again and repeat the explanation for all 3 and she states that she wants to be a CC. I ask if she has any other questions for me. She states that her brother passed away after being down for an undetermined amount of time.   He was intubated and the patient states that she does not want to go through any of that. Principle Problem/Diagnosis:  Near syncope [R55]    Goals of care evaluation:  The patient goals of care are improve or maintain function/quality of life and preserve independence/autonomy/control   Goals of care discussed with:    [x] Patient independently    [] Patient and Family    [] Family or Healthcare DPOA independently    [] Unable to discuss with patient, family/DPOA not present      Palliative Care will continue to follow Ms. Holman's care as needed. Thank you for allowing Palliative Care to participate in the care of Ms. Dipesh Miles .     Electronically signed by   Rachana Manjarrez RN  Palliative Care Team  on 5/26/2022 at 1:28 PM    Palliative care office: [phone]

## 2022-05-26 NOTE — PROGRESS NOTES
Physical Therapy  Facility/Department: [facility] AT THE [facility] SURG  Physical Therapy Initial Assessment    Name: Shefali Carney  : 1940  MRN: [id number]  Date of Service: 2022    Discharge Recommendations:  Home independently   PT Equipment Recommendations  Equipment Needed: No      Patient Diagnosis(es): The primary encounter diagnosis was Near syncope. Diagnoses of Abnormal EKG and Elevated troponin were also pertinent to this visit. Past Medical History:  has a past medical history of Diabetes mellitus ([address] Hypertension, and Hypothyroidism. Past Surgical History:  has no past surgical history on file. Assessment   Assessment: Pt is a very pleasant 79yo woman who presents with near syncope. Pt is IND with all mobility and gait and appears safe with all transfers. Pt does not believe she needs services at this time. Pt instructed to contact staff if assistence is needed, no PT required at this time.   Therapy Prognosis: Excellent  Decision Making: Low Complexity  No Skilled PT: Independent with functional mobility   Requires PT Follow-Up: No  Activity Tolerance  Activity Tolerance: Patient tolerated evaluation without incident;Patient tolerated treatment well     Plan   Plan  Plan: Discharge  Safety Devices  Type of Devices: Left in bed,Call light within reach     Restrictions  Restrictions/Precautions  Required Braces or Orthoses?: No     Subjective   General  Chart Reviewed: Yes  Referring Practitioner: Racquel Holt  Referral Date : 22  Diagnosis: Near syncope  Follows Commands: Within Functional Limits  Subjective  Subjective: Pt is very pleasent and agreable to eval.         Social/Functional History  Social/Functional History  Lives With: Family  Type of Home: House  Home Layout: One level  Home Access: Level entry  Bathroom Shower/Tub: Walk-in shower  Bathroom Toilet: Handicap height  Bathroom Equipment: Grab bars in shower  Has the patient had two or more falls in the past year or any fall with injury in the past year?: No  ADL Assistance: Independent  Homemaking Assistance: Independent  Homemaking Responsibilities: Yes  Ambulation Assistance: Independent  Transfer Assistance: Independent  Active : Yes  Additional Comments: Patient reports indep PTA with no issues. Vision/Hearing  Vision Exceptions: Wears glasses at all times  Hearing: Exceptions to [facility]  Hearing Exceptions: Bilateral hearing aid    Cognition   Orientation  Overall Orientation Status: Within Functional Limits  Cognition  Overall Cognitive Status: WFL     Objective   Heart Rate: 77  Heart Rate Source: Monitor  BP: 123/75  BP Location: Left upper arm  Patient Position: Semi fowlers  MAP (Calculated): 91  Resp: 16  SpO2: 94 %  O2 Device: None (Room air)     Observation/Palpation  Posture: Good        AROM RLE (degrees)  RLE AROM: WNL  AROM LLE (degrees)  LLE AROM : WNL  Strength RLE  Strength RLE: WNL  Strength LLE  Strength LLE: WNL        Balance  Sitting: Intact  Standing: Intact  Gait  Overall Level of Assistance: Modified independent  Stairs - Level of Assistance: Other (comment) (IV pole)  Bed mobility  Supine to Sit: Independent  Sit to Supine: Independent  Scooting: Independent  Transfers  Sit to Stand: Independent  Stand to sit:  Independent  Stand Pivot Transfers: Independent  Ambulation  WB Status: FWB  Ambulation  Surface: level tile  Device: No Device  Assistance: Independent  Gait Deviations: None  Distance: 15'     Balance  Posture: Good  Sitting - Static: Good  Sitting - Dynamic: Good  Standing - Static: Good  Standing - Dynamic: Good         AM-PAC Score     AM-PAC Inpatient Mobility without Stair Climbing Raw Score : 20 (05/26/22 0945)  AM-PAC Inpatient without Stair Climbing T-Scale Score : 60.57 (05/26/22 0945)  Mobility Inpatient CMS 0-100% Score: 0 (05/26/22 0945)  Mobility Inpatient without Stair CMS G-Code Modifier : CH (05/26/22 0945)       Goals  Long Term Goals  Long term goal 1: Pt to be IND with all mobility and ADL's to improve functional IND.        Education  Patient Education  Education Given To: Patient  Education Provided: Role of Therapy;Plan of Care  Education Method: Verbal  Barriers to Learning: None  Education Outcome: Verbalized understanding      Therapy Time   Individual Concurrent Group Co-treatment   Time In 0936         Time Out 0942         Minutes 6         Timed Code Treatment Minutes: 6 Minutes       Margot Aguilera, PT

## 2022-05-26 NOTE — PROGRESS NOTES
Patient was up with assistance. Patient was steady on her feet going to the bathroom and denies any dizziness or chest pain at this time.

## 2022-05-26 NOTE — PROGRESS NOTES
SW met with pt to complete assessment. Pt is alert and oriented and very pleasant with assessment. Pt is a 80year old female admitted for near syncope. Pt typically lives alone in her [id number] [facility] at [address] but her cousin's sister is currently staying with her and is deciding if she will stay long term or not. Pt reports that she uses no DME or community services. Pt drives and is able to get to appointments without concerns Pt reports that the little bit of medication she takes now is affordable but she does not have a Part D program. SW provided Good Rx card to assist if needed. Pt is listed as a full code currently but definitely wants to be a DNR. Pt also does not have advance directives and wants to get these completed while she is here. SW called to Palliative care and they will assist pt with both of these items. Pt does state that she would want her sister to be her DPOA. Pt does not have a PCP currently as she moved here from [place] about a year ago. Pt reports that she would like to follow with Marjan Grubbs CNP or Dr Joel Ramirez. Pt plans to go home hopefully this afternoon. Pt identifies no discharge needs or concerns at this time. SW will remain available as needed.  Nelly PHELPS 5/26/2022

## 2022-05-26 NOTE — PROGRESS NOTES
Pt is A&Ox4, calm and cooperative. Assmt and VS completed as charted, see flow sheet. IV fluids continue as ordered, see MAR. Pt resting in bed, call light within reach, denies pain, denies further needs. Will continue to monitor.

## 2022-05-27 NOTE — DISCHARGE INSTR - DIET

## 2022-05-27 NOTE — PROGRESS NOTES
Progress Note    SUBJECTIVE:    Patient seen for f/u of Near syncope. She sitting up in bed with no complaints. Tolerating diet and activity well. ROS:   Constitutional: negative  for fevers, and negative for chills. Respiratory: negative for shortness of breath, negative for cough, and negative for wheezing  Cardiovascular: negative for chest pain, and negative for palpitations  Gastrointestinal: negative for abdominal pain, negative for nausea,negative for vomiting, negative for diarrhea, and negative for constipation     All other systems were reviewed with the patient and are negative unless otherwise stated in HPI      OBJECTIVE:      Vitals:   Vitals:    05/27/22 0639   BP: 139/82   Pulse: 70   Resp: 16   Temp: 97.5 °F (36.4 °C)   SpO2: 98%     Weight: 123 lb 10.9 oz (56.1 kg)   Height: 5' 4\" (162.6 cm)     Weight  Wt Readings from Last 3 Encounters:   05/27/22 123 lb 10.9 oz (56.1 kg)     Body mass index is 21.23 kg/m². 24HR INTAKE/OUTPUT:      Intake/Output Summary (Last 24 hours) at 5/27/2022 0751  Last data filed at 5/27/2022 0641  Gross per 24 hour   Intake 440 ml   Output --   Net 440 ml     -----------------------------------------------------------------  Exam:    GEN:    Awake, alert and oriented x3. EYES:  EOMI, pupils equal   NECK: Supple. No lymphadenopathy. No carotid bruit  CVS:    regular rate and rhythm, systolic murmur  PULM:  CTA, no wheezes, rales or rhonchi, no acute respiratory distress  ABD:    Bowels sounds normal.  Abdomen is soft. No distention. no tenderness to palpation. EXT:   no edema bilaterally . No calf tenderness. NEURO: Moves all extremities. Motor and sensory are grossly intact  SKIN:  No rashes.   No skin lesions.    -----------------------------------------------------------------    Diagnostic Data:      Complete Blood Count:   Recent Labs     05/25/22  1004 05/26/22  0520 05/27/22  0530   WBC 7.0 7.3 7.1   RBC 3.83* 3.73* 3.81*   HGB 10.7* 10.3* 10.5* HCT 33.5* 32.9* 33.2*   MCV 87.5 88.2 87.1   MCH 27.9 27.6 27.6   MCHC 31.9 31.3 31.6   RDW 13.3 13.5 13.7    250 260   MPV 9.1 9.3 9.5        Last 3 Blood Glucose:   Recent Labs     05/25/22  1004 05/26/22  0520 05/27/22  0530   GLUCOSE 162* 117* 135*        Comprehensive Metabolic Profile:   Recent Labs     05/25/22  1004 05/26/22  0520 05/27/22  0530   * 133* 134*   K 4.4 4.1 4.0   CL 95* 100 101   CO2 24 24 23   BUN 18 20 16   CREATININE 1.29* 1.53* 1.16*   GLUCOSE 162* 117* 135*   CALCIUM 9.0 8.7 8.5*   PROT 6.9  --   --    LABALBU 4.2  --   --    BILITOT 0.38  --   --    ALKPHOS 69  --   --    AST 17  --   --    ALT 13  --   --         Urinalysis:   Lab Results   Component Value Date    NITRU NEGATIVE 05/25/2022    COLORU Yellow 05/25/2022    PHUR 6.0 05/25/2022    WBCUA 0 TO 2 05/25/2022    RBCUA 0 TO 2 05/25/2022    SPECGRAV <1.005 05/25/2022    LEUKOCYTESUR NEGATIVE 05/25/2022    UROBILINOGEN Normal 05/25/2022    BILIRUBINUR NEGATIVE 05/25/2022    GLUCOSEU NEGATIVE 05/25/2022    KETUA NEGATIVE 05/25/2022       HgBA1c:  No results found for: LABA1C    Lactic Acid: No results found for: LACTA     Troponin: No results for input(s): TROPONINI in the last 72 hours. CRP:  No results for input(s): CRP in the last 72 hours. Radiology/Imaging:  XR CHEST (2 VW)   Final Result   Interstitial opacities at the posterior lung bases which may be due to   underlying fibrosis. Trace bilateral effusions. Cardiomegaly. ASSESSMENT / PLAN:  Near syncope  · Continue current therapy   ? Appreciate cardiology  ? IV lock  ? Orthostatic vital signs-negative  ? Echocardiogram- severe mitral regurgitation with estimated RVSP of 96, idiopathic cardiomyopathy mitral regurgitation with an EF of 30 to 35%  ? Start Toprol-XL 25 mg daily  ? Start losartan 25 mg daily  ? We will plan heart cath on June 7 per Dr. Ashlyn Hankins  ? JACK rashid  ? Ambulate  · Type 2 diabetes  ? A1c-6.5  · Hypertension  ?  Hold Dyazide  · Probable prerenal azotemia however no baseline creatinine in epic  ?  Patient does report history of kidney disease  · Nutrition status:   · Well developed, well nourished with no malnutrition  · Dietician consult initiated  · Hospital Prophylaxis:   · DVT: Lovenox   · Stress Ulcer: H2 Blocker   · High risk medications: none   · Disposition:    · Discharge plan is home today      SHANNON Holman CNP , SHANNON, NP-C  Hospitalist Medicine        5/27/2022, 7:51 AM

## 2022-05-27 NOTE — PLAN OF CARE
Problem: Discharge Planning  Goal: Discharge to home or other facility with appropriate resources  Outcome: Progressing  Flowsheets (Taken 5/27/2022 0020)  Discharge to home or other facility with appropriate resources:   Identify barriers to discharge with patient and caregiver   Identify discharge learning needs (meds, wound care, etc)     Problem: Safety - Adult  Goal: Free from fall injury  Outcome: Progressing  Flowsheets (Taken 5/27/2022 0020)  Free From Fall Injury: Instruct family/caregiver on patient safety

## 2022-05-27 NOTE — DISCHARGE SUMMARY
Discharge Summary    Ap Sams  :  1940  MRN:  [id number]    Admit date:  2022      Discharge date: 2022     Admitting Physician:  Ellis Venegas MD    Discharge Diagnoses:    Principal Problem:    Near syncope  Active Problems:    Severe mitral regurgitation by prior echocardiogram    Idiopathic cardiomyopathy (HCC)    Diabetes mellitus ([address]    Hypertension    Hypothyroidism    Probable prerenal azotemia - however no baseline creatinine level in Epic    WEI (acute kidney injury) ([address]    Severe pulmonary hypertension (HCC)    Mild-Severe tricuspid valve regurgitation    Mild mitral stenosis    Aortic stenosis, severe    Dilated cardiomyopathy (HCC) - EF 35-40%  Resolved Problems:    * No resolved hospital problems. [facility] Course: Ap Sams is a 80 y.o. female admitted with syncope. She presented to the ER due to \"not feeling right\". She stated 2 days prior she had \"lightheadedness\" but no spinning or syncope, but fell. She has stated that this has occurred several times in the past.  She stated she had no injury at that time. She denied recent illness including fever, chills. She denied n/v/d. She stated she may not be taking enough liquids. She denied headaches or vision changes. During her evaluation she did have elevated BUN and creatinine although no other levels within epic to compare with. Troponins were 23 and 19 and 19. Urinalysis was negative. CBC was negative. During patient's admission she was given IV fluids and labs were trended. Patient appeared to have acute kidney injury however there were no previous labs to compare with. Patient did admit that she did have history of kidney disease and that her Glucophage was stopped by a provider in [place] because it was hurting her kidneys she stated. Patient had no syncopal episodes during her hospitalization. Patient had echocardiogram completed and cardiology was consulted.   Patient was found to have an EF of 35-40 % with severe mitral regurgitation with idiopathic cardiomyopathy. Patient was started on Toprol-XL as well as losartan and tolerated this well. I will stop her Dyazide at this time. She will be planned for outpatient cardiac cath around June 7. She will follow-up with cardiology in 1 week. She is going to establish care with Dr. Yan Sen office and will follow up with him as well. An A1c was ran and she was 6.5 I will defer to her primary care to initiate medications at that time. Patient is agreeable to this plan of care and will be discharged home today. Consultants:  Dr. Neyda Lepe, cardiology    Procedures: Echocardiogram    Complications: none    Discharge Condition: fair    Exam:  GEN:    Awake, alert and oriented x3. EYES:   EOMI, pupils equal   NECK: Supple. No lymphadenopathy. No carotid bruit  CVS:     regular rate and rhythm, systolic murmur  PULM:  CTA, no wheezes, rales or rhonchi, no acute respiratory distress  ABD:     Bowels sounds normal.  Abdomen is soft. No distention. no tenderness to palpation. EXT:     no edema bilaterally . No calf tenderness. NEURO: Moves all extremities. Motor and sensory are grossly intact  SKIN:    No rashes. No skin lesions.       Significant Diagnostic Studies:   Lab Results   Component Value Date    WBC 7.1 05/27/2022    HGB 10.5 (L) 05/27/2022     05/27/2022       Lab Results   Component Value Date    BUN 16 05/27/2022    CREATININE 1.16 (H) 05/27/2022     (L) 05/27/2022    K 4.0 05/27/2022    CALCIUM 8.5 (L) 05/27/2022     05/27/2022    CO2 23 05/27/2022    LABGLOM 45 (L) 05/27/2022       Lab Results   Component Value Date    WBCUA 0 TO 2 05/25/2022    RBCUA 0 TO 2 05/25/2022    EPITHUA 0 TO 2 05/25/2022    LEUKOCYTESUR NEGATIVE 05/25/2022    SPECGRAV <1.005 (L) 05/25/2022    GLUCOSEU NEGATIVE 05/25/2022    KETUA NEGATIVE 05/25/2022    PROTEINU NEGATIVE 05/25/2022    HGBUR NEGATIVE 05/25/2022       ECHO Complete 2D W Doppler W Color    Result Date: 5/26/2022  [address] [place] [address] Transthoracic Echocardiography Report (TTE)  Patient Name [facility]      Date of Study           05/26/2022               Ashley WEINSTEIN   Date of      1940  Gender                  Female  Birth   Age          80 year(s)  Race                       Room Number  0301        Height:                 64 inch, 162.56 cm   Corporate ID [id number]   Weight:                 124 pounds, 56.2 kg  #   Patient Acct [de-identified]   BSA:        1.6 m^2     BMI:         21.28 kg/m^2  #   MR #         [id number]      Sonographer             Araceli Marie   Accession #  [id number]  Interpreting Physician  Rossana Lowry   Fellow                   Referring Nurse                           Practitioner   Interpreting             Referring Physician     Beti Del Toro,  Fellow                                           SANAZ *  Type of Study   TTE procedure:2D Echocardiogram, M-Mode, Doppler, Color Doppler. Procedure Date Date: 05/26/2022 Start: 09:10 AM Study Location: [facility] Indications:Congestive heart failure. History / Tech. Comments: CHF PMHX: HTN, DM Patient Status: Inpatient Height: 64 inches Weight: 124 pounds BSA: 1.6 m^2 BMI: 21.28 kg/m^2 BP: 123/75 mmHg CONCLUSIONS Summary Global left ventricular systolic function appears mildly to moderately reduced with an estimated ejection fraction of 35-40%. The inferoseptal and inferior walls appear hypokinetic in their motion. The left ventricular cavity size is within normal limits and the left ventricular wall thickness is moderately increased. No definite specific wall motion abnormalities were identified. The left atrium is severely dilated (>40) with a left atrial volume index of 45 ml/m2. Moderate myxomatous thickening of the mitral leaflets with mild mitral stenosis. Severe central and posteriorly directly mitral regurgitation mitral regurgitation.  Severe pulmonary hypertension with an estimated right ventricular systolic pressure of 96 mmHg. Moderate to severe tricuspid regurgitation. Evidence of severe (grade III) diastolic dysfunction is seen. No previous studies were available for comparison. Based on the patients severe valvular heart disease, consider additional testing and/or treatment for possible corrective valve surgery if clinically indicated. If the patients reduced ejection fraction is a new finding, consider additional ischemic cardiac testing if clinically indicated. Signature ----------------------------------------------------------------------------  Electronically signed by Araceli Marie(Sonographer) on 05/26/2022 09:42  AM ---------------------------------------------------------------------------- ----------------------------------------------------------------------------  Electronically signed by Grant Sosa(Interpreting physician) on  05/26/2022 01:52 PM ---------------------------------------------------------------------------- FINDINGS Left Atrium The left atrium is severely dilated (>40) with a left atrial volume index of 45 ml/m2. Left Ventricle Global left ventricular systolic function appears mildly to moderately reduced with an estimated ejection fraction of 35-40%. The inferoseptal and inferior walls appear hypokinetic in their motion. The left ventricular cavity size is within normal limits and the left ventricular wall thickness is moderately increased. No definite specific wall motion abnormalities were identified. Right Atrium The right atrium appears mildly dilated. Right Ventricle Normal right ventricular size and function. Mitral Valve Moderate myxomatous thickening of the mitral leaflets with mild mitral stenosis. Severe central and posteriorly directly mitral regurgitation mitral regurgitation. Aortic Valve Normal aortic valve structure with trivial aortic regurgitation.  Tricuspid Valve Severe pulmonary hypertension with an estimated right ventricular systolic pressure of 96 mmHg. Moderate to severe tricuspid regurgitation. Pulmonic Valve The pulmonic valve is normal in structure. Pericardial Effusion No significant pericardial effusion is seen. Miscellaneous Evidence of severe (grade III) diastolic dysfunction is seen. Normal aortic root dimension. M-mode / 2D Measurements & Calculations:   LVIDd:5.31 cm(3.7 - 5.6 cm)      Diastolic PXYMRY:676.05 ml  LVIDs:4.4 cm(2.2 - 4.0 cm)       Systolic KIXQQE:71.1 ml  JOYX:9.77 cm(0.6 - 1.1 cm)       Aortic Root:2.88 cm(2.0 - 3.7 cm)  LVPWd:1.31 cm(0.6 - 1.1 cm)      LA Dimension: 5.08 cm(1.9 - 4.0 cm)  Fractional Shortenin.14 %    LA volume/Index: 72 ml /45m^2  Calculated LVEF (%): 43.11 %     AV Cusp Separation: 1.64 cm                                   RVDd:2.6 cm   Mitral:                                 Aortic   Valve Area (P1/2-Time): 4.32 cm^2       Peak Velocity: 1.32 m/s  Peak E-Wave: 1.68 m/s                   Mean Velocity: 0.92 m/s  Peak A-Wave: 1.34 m/s                   Peak Gradient: 6.97 mmHg  E/A Ratio: 1.26                         Mean Gradient: 3.74 mmHg  Peak Gradient: 11.31 mmHg  Mean Gradient: 5.75 mmHg                Acceleration Time: 87.02 msec  P1/2t: 50.98 msec                                          AV VTI: 21.33 cm   Tricuspid:                              Pulmonic:   Estimated RVSP: 95.98 mmHg  Peak TR Velocity: 4.69 m/s  Peak TR Gradient: 87.9844 mmHg  Estimated RA Pressure: 8 mmHg                                          Estimated PASP: 95.98 mmHg  Diastology / Tissue Doppler Lateral Wall E' velocity:0.07 m/s Lateral Wall E/E':24.17    XR CHEST (2 VW)    Result Date: 2022  EXAMINATION: TWO XRAY VIEWS OF THE CHEST 2022 1:15 pm COMPARISON: None. HISTORY: ORDERING SYSTEM PROVIDED HISTORY: near syncope / severe cardiomyopathy TECHNOLOGIST PROVIDED HISTORY: near syncope / severe cardiomyopathy FINDINGS: Interstitial opacities at the posterior lung bases. Probable trace effusions. No pneumothorax. Cardiomegaly. Diffuse osseous demineralization with degenerative changes throughout the spine. Interstitial opacities at the posterior lung bases which may be due to underlying fibrosis. Trace bilateral effusions. Cardiomegaly.        Assessment and Plan:  Patient Active Problem List    Diagnosis Date Noted    Severe mitral regurgitation by prior echocardiogram     [facility]     WEI (acute kidney injury) ([facility] 05/26/2022    Severe pulmonary hypertension ([facility] 05/26/2022    Mild-Severe tricuspid valve regurgitation 05/26/2022    Mild mitral stenosis 05/26/2022    Aortic stenosis, severe 05/26/2022    Dilated cardiomyopathy ([facility] - EF 35-40% 05/26/2022    Near syncope 05/25/2022    Diabetes mellitus ([facility] 05/25/2022    Hypertension 05/25/2022    Hypothyroidism 05/25/2022    Probable prerenal azotemia - however no baseline creatinine level in Epic 05/25/2022        Discharge Medications:         Medication List      START taking these medications    losartan 25 MG tablet  Commonly known as: COZAAR  Take 1 tablet by mouth daily  Start taking on: May 28, 2022     metoprolol succinate 25 MG extended release tablet  Commonly known as: TOPROL XL  Take 1 tablet by mouth daily  Start taking on: May 28, 2022        CONTINUE taking these medications    acetaminophen 325 mg tablet  Commonly known as: TYLENOL     Bonine 25 MG Chew  Generic drug: meclizine     levothyroxine 100 MCG tablet  Commonly known as: SYNTHROID  Take 1 tablet by mouth Daily     VISION VITAMINS PO        STOP taking these medications    triamterene-hydroCHLOROthiazide 37.5-25 MG per capsule  Commonly known as: DYAZIDE           Where to Get Your Medications      These medications were sent to [facility] [id number] [address] [address]    Phone: [phone]   · levothyroxine 100 MCG tablet  · losartan 25 MG tablet  · metoprolol succinate 25 MG extended release tablet         Patient Instructions:    Activity: activity as tolerated  Diet: cardiac diet  Wound Care: none needed  Other: BMP in 1 week    Disposition:   Discharge to Home    Follow up:  Patient will be followed by Dr. Mccormack Poster in 1-2 weeks; Dr. Maxwell Alvarenga 1 week    CORE MEASURES on Discharge (if applicable)  ACE/ARB in CHF: NA  Statin in MI: NA  ASA in MI: NA  Statin in CVA: NA  Antiplatelet in CVA: NA    Total time spent on discharge services: 40 minutes    Including the following activities:  Evaluation and Management of patient  Discussion with patient and/or surrogate about current care plan  Coordination with Case Management and/or   Coordination of care with Consultants (if applicable)   Coordination of care with Receiving Facility Physician (if applicable)  Completion of DME forms (if applicable)  Preparation of Discharge Summary  Preparation of Medication Reconciliation  Preparation of Discharge Prescriptions    Signed:  SHANNON Rosario CNP, SHANNON, NP-C  5/27/2022, 10:26 AM

## 2022-05-31 NOTE — PROGRESS NOTES
Patient: Chun Brush  : 1940  Date of Admission: (Not on file)  Primary Care Physician: Richard Campbell  Today's Date: 2022    REASON FOR CONSULTATION: Follow-Up from Hospital (HX: syncope, HTN, hypothyroid, diabetes. pt is here for hospital f/u for syncope - pt states she fell because she was so dizzy. pt has been having off and on dizziness but has never fallen. pt states she had not had any dizzy spells since she left the hospital but she is SOB. pt denies CP, palpitations.)      HPI: Ms. Dudley Diane is a 80 y.o. female who was admitted to the hospital with a syncopal episode leading to this consultation. This has been an on and off issue for her for the last 8 years or so. Her last episode was about a year ago. She denies ever being told herself having heart problems. Her mother had heart disease, CHF towards end of life, and a sister had valve problems and surgery. She denies history of hypertension or hyperlipidemia. She had diabetes for 20 years. In her chart she has the diagnosis of both hypertension and hyperlipidemia. Ms. Dudley Diane was admitted 2022 through 2022 for a fall after being lightheaded/dizzy. Echo 2022: Thomas Flake left ventricular systolic function appears mildly to moderately reduced with an estimated ejection fraction of 35-40%. The inferoseptal and inferior walls appear hypokinetic in their motion. The left ventricular cavity size is within normal limits and the left  ventricular wall thickness is moderately increased. No definite specific wall motion abnormalities were identified. The left atrium is severely dilated (>40) with a left atrial volume index of 45 ml/m2. Moderate myxomatous thickening of the mitral leaflets with mild mitral stenosis. Severe central and posteriorly directly mitral regurgitation mitral regurgitation. Severe pulmonary hypertension with an estimated right ventricular systolic pressure of 96 mmHg.  Moderate to severe APRN - CNP   metoprolol succinate (TOPROL XL) 25 MG extended release tablet Take 1 tablet by mouth daily 5/28/22  Yes Leah Begin, APRN - CNP   levothyroxine (SYNTHROID) 100 MCG tablet Take 1 tablet by mouth Daily 5/27/22  Yes Berger Begin, APRN - CNP   meclizine (BONINE) 25 MG CHEW Take 12.5 mg by mouth 3 times daily as needed (dizziness)   Yes Historical Provider, MD   acetaminophen (TYLENOL) 325 mg tablet Take 650 mg by mouth every 6 hours as needed for Pain   Yes Historical Provider, MD   Multiple Vitamins-Minerals (VISION VITAMINS PO) Take 1 tablet by mouth daily Vision Multi 50+   Yes Historical Provider, MD       FAMILY HISTORY: 2 sisters with CAD, 1 with stents and 1 with CABG and 1 with a valve repair in 60's     Physical Examination:     /69 (Site: Left Upper Arm, Position: Sitting, Cuff Size: Medium Adult)   Pulse 66   Resp 18   Ht 5' 4\" (1.626 m)   Wt 126 lb (57.2 kg)   SpO2 100%   BMI 21.63 kg/m²  Body mass index is 21.63 kg/m². Constitutional: She appeared oriented to person and place. She appears well-developed and well-nourished. In no acute distress. HEENT: Normocephalic and atraumatic. No JVD present. Carotid bruit is not present. No mass and no thyromegaly present. No lymphadenopathy noted. Cardiovascular: Normal rate, regular rhythm, normal heart sounds. Exam reveals no gallop and no friction rubs. 3/6 systolic murmur, 5th intercostal space on the LEFT in the mid-clavicular line (cardiac apex)  Pulmonary/Chest: Effort normal and breath sounds normal. No respiratory distress. She has no wheezes, rhonchi or rales. Abdominal: Soft, non-tender. She exhibits no organomegaly, mass or bruit. Extremities: None. No cyanosis or clubbing. 2+ radial and carotid pulses. Distal extremity pulses: 2+ bilaterally. Neurological: Alertness and orientation as per Constitutional exam. No evidence of gross cranial nerve deficit. Coordination appeared normal.   Skin: Skin is warm and dry. There is no rash or diaphoresis. Psychiatric: She has a normal mood and affect. Her speech is normal and behavior is normal.      MOST RECENT LABS ON RECORD:   Lab Results   Component Value Date    WBC 7.1 05/27/2022    HGB 10.5 (L) 05/27/2022    HCT 33.2 (L) 05/27/2022     05/27/2022    CHOL 129 05/26/2022    TRIG 101 05/26/2022    HDL 32 (L) 05/26/2022    ALT 13 05/25/2022    AST 17 05/25/2022     (L) 05/27/2022    K 4.0 05/27/2022     05/27/2022    CREATININE 1.16 (H) 05/27/2022    BUN 16 05/27/2022    CO2 23 05/27/2022    TSH 0.59 05/25/2022    LABA1C 6.4 (H) 05/26/2022     ASSESSMENT:  Patient Active Problem List    Diagnosis Date Noted    Severe mitral regurgitation by prior echocardiogram     [facility]     WEI (acute kidney injury) ([facility] 05/26/2022    Severe pulmonary hypertension ([address] 05/26/2022    Mild-Severe tricuspid valve regurgitation 05/26/2022    Mild mitral stenosis 05/26/2022    Aortic stenosis, severe 05/26/2022    Near syncope 05/25/2022    Diabetes mellitus ([address] 05/25/2022    Hypertension 05/25/2022    Hypothyroidism 05/25/2022    Probable prerenal azotemia - however no baseline creatinine level in Epic 05/25/2022    Dilated cardiomyopathy ([facility] - EF 35-40% 05/26/2022      PLAN:   Lightheadedness/Dizziness: Improved since hsopitlization   Unclear etiology but I suspect related to her severe mitral regurgitation as well as idiopathic cardiomyopathy which I suspect is due to ischemic heart disease   · Pharmacological Management: Not indicated             Shortness of breath with mild exertion: Likely due to her severe mitral regurgitation as well as idiopathic cardiomyopathy which I suspect is due to ischemic heart disease     Essential Hypertension: Controlled  Beta Blocker: Continue Metoprolol succinate (Toprol XL) 25 mg daily. ACE Inibitor/ARB: Continue losartan (Cozaar) 25 mg daily.    Calcium Channel Blocker: Not indicated at this time.   Diuretics: Not indicated at this time. Additional Testing List: None    · Possible history of Hyperlipidemia: LDL on 5/26/2022: 77 mg/dL  · Cholesterol Reduction Therapy: Not indicated at this time. · Severe Mitral Regurgitation: Likely symptomatic with a estimated RVSP of 96 which is severely elevated. Unclear etiology but given low ER this is likely very serious and her options to treat this may be limited  · Continue Toprol XL 25 mg daily   · Continue lostartan 25 mg daily     · Idiopathic Cardiomyopathy: Suspect CAD +/- severe mitral regurgitation   For these reasons, I recommended that the patient consider undergoing a cardiac catheterization with coronary angiography to assess their coronary anatomy and facilitate better treatment recommendations. I discussed the risks, benefits, and alternatives to the procedure including the risk of bleeding, contrast induced allergy and/or kidney damage, arrythmia, stroke, heart attack, death, or the need for further procedures. I also discussed the fact that although treatment with simple medical management is a potential treatment option in place of cardiac catheterization, I expressed my opinion that cardiac catheterization in order to define their coronary anatomy and rule out severe 3 vessel or left main coronary artery disease would significant help guide the most appropriate treatment strategy ranging from no treatment, to medications, stents, to even coronary bypass surgery. The patient verbalized understanding of the risks benefits and alternatives and stated that they would like to undergo the procedure. We will plan to schedule the procedure here at [facility] on June 7th. Additional Testing List: Additional Testing List: I took the liberty of ordering a BMP for today to assess their potassium and renal function.  I told them that they could get their lab work performed at the location of their choosing, unfortunately, if the lab work was not performed at a [facility] I could not guarantee my ability to follow up with them on their results. and  I took the liberty of ordering a CBC. I told them that they could get their lab work performed at the location of their choosing, unfortunately, if the lab work was not performed at a [facility]) facility I could not guarantee my ability to follow up with them on their results. Once again, thank you for allowing me to participate in this patients care. Please do not hesitate to contact me if I could be of any further assistance. Sincerely,  Cierra Rodrigues  [facility] Cardiology Specialist    [address]  Phone: [phone], Fax: [phone]     I believe that the risk of significant morbidity and mortality related to the patient's current medical conditions are: high. Approximately 45 minutes were spent during prep work, discussion and exam of the patient, and follow up documentation and all of their questions were answered.       May 31, 2022

## 2022-05-31 NOTE — TELEPHONE ENCOUNTER
----- Message from Lotus Stinson PA-C sent at 5/31/2022  4:17 PM EDT -----  Please notify patient that their lab results are normal.   Please continue current treatment and follow up.

## 2022-05-31 NOTE — PATIENT INSTRUCTIONS
SURVEY:    You may be receiving a survey from [employer] regarding your visit today. Please complete the survey to enable us to provide the highest quality of care to you and your family. If you cannot score us a very good on any question, please call the office to discuss how we could have made your experience a very good one. Thank you.

## 2022-06-09 PROBLEM — I25.5 ISCHEMIC CARDIOMYOPATHY: Status: ACTIVE | Noted: 2022-01-01

## 2022-06-09 NOTE — PROGRESS NOTES
Vital signs stable, dressing clean and dry. Bella Savage RN reviewing discharge instructions with patient and family.

## 2022-06-09 NOTE — OP NOTE
-  Coronary Angiography Brief Post Operative Note:    Severe three vessel coronary artery disease involving a 100% proximal RCA occlusion with left to right filling of the mid and distal vessel, 100% proximal Cx occlusion with left to left filling of a large OM1 and a 60-70% LAD stenosis with a 90% D1 ostial stenosis. Normal left ventricular end diastolic pressure. The patient also has a 30-35% EF with severe mitral regurgitation and an estimated RVSP 2 weeks ago of 96 mmHG. Consult to interventional cardiology for consideration of angioplasty and/or stenting of the patients severe stenosis +/- mitral valve clipping as the patient is not willing to consider open heart surgery.      Electronically signed by Lashonda Bowers MD on 6/9/2022 at 11:31 AM

## 2022-06-13 NOTE — TELEPHONE ENCOUNTER
Patient is calling about scheduling an appt with Dr. Murray Rodrigues. She said that Dr. Malka Jimenez had wanted her to f/u with him.

## 2022-06-13 NOTE — TELEPHONE ENCOUNTER
Patient LM that she needed to RS appt. Appt scheduled for 6/30/2022 with Dr. Kaz Jama. I left her a message to call our office back. If she needs to RS, schedule for 6/23 pr 7/1/2022.

## 2022-06-20 NOTE — TELEPHONE ENCOUNTER
Orders received from Dr. Jeimy Leger to schedule the patient for a BANDAR and appointment in the same day if able. Updated [facility] at Dr. Tatum [place] office and she will get back with us in regards to this request.    Appointment for 6/23/2022 at 09:45am has been cancelled. Patient notified and instructions mailed out.

## 2022-06-20 NOTE — PROGRESS NOTES
Patient: Leonardo Rodriguez  : 1940  Date of Visit: 2022    REASON FOR VISIT / CONSULTATION: Follow-up (HX: severe mitral valve regurg. cardiomyopathy, WEI, HTN, mild mitral stenosis, aortic stenosis, hypothyroid, diabetes. pt is here today for a heart cath f/u pt states she is doing well but does get tired easily. SOB while walking her dogs off and on. pt denies CP, palpitations, light headed/dizziness. )      History of Present Illness:        Dear Juan Nino MD    I had the pleasure of seeing Ms. Lopez Pozo is a 80 y.o. female for follow up after a heart catheterization today . She was admitted to the hospital with a syncopal episode which is when she first started seeing a cardiologist. This has been an on and off issue for her for the last 8 years or so. Her last episode was about a year ago. She denies ever being told herself having heart problems. Her mother had heart disease, CHF towards end of life, and a sister had valve problems and surgery. She denies history of hypertension or hyperlipidemia. She had diabetes for 20 years. In her chart she has the diagnosis of both hypertension and hyperlipidemia. Ms. Lopez Pozo was admitted 2022 through 2022 for a fall after being lightheaded/dizzy. Echo 2022: Raysa Townsend left ventricular systolic function appears mildly to moderately reduced with an estimated ejection fraction of 35-40%. The inferoseptal and inferior walls appear hypokinetic in their motion. The left ventricular cavity size is within normal limits and the left  ventricular wall thickness is moderately increased. No definite specific wall motion abnormalities were identified. The left atrium is severely dilated (>40) with a left atrial volume index of 45 ml/m2. Moderate myxomatous thickening of the mitral leaflets with mild mitral stenosis. Severe central and posteriorly directly mitral regurgitation mitral regurgitation.  Severe pulmonary hypertension with an estimated right ventricular systolic pressure of 96 mmHg. Moderate to severe tricuspid regurgitation. Evidence of severe (grade III) diastolic dysfunction is seen. Heart Catheterization done 6/9/2022: Severe three vessel disease involving the LAD, D1 branch of the LAD and  of the proximal circumflex and right coronary arteries. Normal LVEDP. Patient also with known Severe mitral regurgitation with an EF of 30-35%    Ms. Titi Mcgee returns to the office today for a heart catheterization follow up. She reports she continues to feel tired. She has a follow up with Dr Azra Montana on Thursday to discuss treatment options. She states she does not want to undergo open heart surgery. She denies any chest pain or chest pressure. She continues to have shortness of breath, she does not report this has worsened since last being seen. She reports since May she has notice worsening leg swelling. She states it improves when elevating her legs, she does not wear TEDs. She states she has not had any further dizziness. She reports she gets tired easily. She also notes getting short of breath with activity easily. She reports she walks very slow. She does walk her dog, but she has to sit down to rest when she gets back as she is out of breath. She reports her shortness of breath has been worsening off and on for 4 or 5 years, it has worsened over the past couple of weeks starting after May 18, 2022. She reports she has not felt right since starting the medications last time she was seen; however cannot describe what \"not feeling right\" means, no lightheaded or dizziness. Ms. Titi Mcgee also denied any current or recent chest pain, abdominal pain, bleeding problems, problems with her medications or any other concerns at this time.      Past Medical History:   Diagnosis Date    WEI (acute kidney injury) ([facility] 05/26/2022    Aortic stenosis, severe 05/26/2022    Diabetes mellitus ([facility] 2020    Dilated cardiomyopathy ([address] 05/26/2022  Hypertension     Hypothyroidism 1968    Mild mitral stenosis 05/26/2022    Mild-Severe tricuspid valve regurgitation 05/26/2022    Severe pulmonary hypertension ([address] 05/26/2022       CURRENT ALLERGIES: Milk-related compounds REVIEW OF SYSTEMS: 14 systems were reviewed. Pertinent positives and negatives as above, all else negative. Past Surgical History:   Procedure Laterality Date    APPENDECTOMY  1964    CARDIAC CATHETERIZATION      CATARACT REMOVAL WITH IMPLANT Bilateral     2006 & 2008    EYE SURGERY      OVARY REMOVAL Right 1964    TONSILLECTOMY  1959    Social History:  Social History     Tobacco Use    Smoking status: Passive Smoke Exposure - Never Smoker    Smokeless tobacco: Never Used   Vaping Use    Vaping Use: Never used   Substance Use Topics    Alcohol use: Not Currently    Drug use: Never        MEDICATIONS:  No current facility-administered medications for this visit. CURRENT INPATIENT MEDICATIONS:  Prior to Admission medications    Medication Sig Start Date End Date Taking?  Authorizing Provider   losartan (COZAAR) 25 MG tablet Take 1 tablet by mouth daily 5/28/22  Yes SHANNON Rosario CNP   metoprolol succinate (TOPROL XL) 25 MG extended release tablet Take 1 tablet by mouth daily 5/28/22  Yes SHANNON Rosario CNP   levothyroxine (SYNTHROID) 100 MCG tablet Take 1 tablet by mouth Daily 5/27/22  Yes SHANNON Rosario CNP   meclizine (BONINE) 25 MG CHEW Take 12.5 mg by mouth 3 times daily as needed (dizziness)   Yes Historical Provider, MD   acetaminophen (TYLENOL) 325 mg tablet Take 650 mg by mouth every 6 hours as needed for Pain   Yes Historical Provider, MD   Multiple Vitamins-Minerals (VISION VITAMINS PO) Take 1 tablet by mouth daily Vision Multi 50+   Yes Historical Provider, MD       FAMILY HISTORY: 2 sisters with CAD, 1 with stents and 1 with CABG and 1 with a valve repair in 60's     Physical Examination:     /86 (Site: Right Upper Arm, Position: Sitting, Cuff Size: Medium Adult)   Pulse 87   Resp 18   Ht 5' 4\" (1.626 m)   Wt 124 lb 9.6 oz (56.5 kg)   SpO2 99%   BMI 21.39 kg/m²  Body mass index is 21.39 kg/m². Constitutional: She appeared oriented to person and place. She appears well-developed and well-nourished. In no acute distress. HEENT: Normocephalic and atraumatic. No JVD present. Carotid bruit is not present. No mass and no thyromegaly present. No lymphadenopathy noted. Cardiovascular: Normal rate, regular rhythm, normal heart sounds. Exam reveals no gallop and no friction rubs. 3/6 systolic murmur, 5th intercostal space on the LEFT in the mid-clavicular line (cardiac apex)  Pulmonary/Chest: Effort normal and breath sounds normal. No respiratory distress. She has no wheezes, rhonchi or rales. Abdominal: Soft, non-tender. She exhibits no organomegaly, mass or bruit. Extremities: None. No cyanosis or clubbing. 2+ radial and carotid pulses. Distal extremity pulses: 2+ bilaterally. Neurological: Alertness and orientation as per Constitutional exam. No evidence of gross cranial nerve deficit. Coordination appeared normal.   Skin: Skin is warm and dry. There is no rash or diaphoresis. Psychiatric: She has a normal mood and affect.  Her speech is normal and behavior is normal.      MOST RECENT LABS ON RECORD:   Lab Results   Component Value Date    WBC 7.1 05/31/2022    HGB 10.8 (L) 05/31/2022    HCT 35.2 (L) 05/31/2022     05/31/2022    CHOL 129 05/26/2022    TRIG 101 05/26/2022    HDL 32 (L) 05/26/2022    ALT 13 05/25/2022    AST 17 05/25/2022     (L) 05/31/2022    K 4.6 05/31/2022     05/31/2022    CREATININE 1.46 (H) 05/31/2022    BUN 20 05/31/2022    CO2 24 05/31/2022    TSH 0.59 05/25/2022    LABA1C 6.4 (H) 05/26/2022     ASSESSMENT:  Patient Active Problem List    Diagnosis Date Noted    Ischemic cardiomyopathy 06/09/2022    Severe mitral regurgitation by prior echocardiogram     Idiopathic cardiomyopathy ([facility]     WEI (acute kidney injury) ([facility] 05/26/2022    Severe pulmonary hypertension ([facility] 05/26/2022    Mild-Severe tricuspid valve regurgitation 05/26/2022    Mild mitral stenosis 05/26/2022    Aortic stenosis, severe 05/26/2022    Near syncope 05/25/2022    Diabetes mellitus ([facility] 05/25/2022    Hypertension 05/25/2022    Hypothyroidism 05/25/2022    Probable prerenal azotemia - however no baseline creatinine level in Epic 05/25/2022    Dilated cardiomyopathy ([facility] - EF 35-40% 05/26/2022      PLAN:   Atherosclerotic Heart Disease:   Antiplatelet Agent: START Aspirin 81 mg daily.  Beta Blocker: Continue Metoprolol succinate (Toprol XL) 25 mg daily.  Anti-anginal medications: Not indicated at this time.  Cholesterol Reduction Therapy: START Atorvastatin (Lipitor) 20 mg daily. I discussed the potential benefits of statin therapy as well as the potential risks including myalgia as well as the rare but potentially serious complication of liver or kidney damage. Although rare, I told them that this could be serious and therefore told them to stop the medication immediately and call if they developed any severe muscle aches or pains and they agreed to do so. LDL 75 mg/dL on 5/26/2022   Additional counseling: I advised them to call our office or go to the emergency room if they developed worsening or persistent chest pain or increased shortness of breath as this could be life threatening.      Lightheadedness/Dizziness: Improved since hsopitlization   Unclear etiology but I suspect related to her severe mitral regurgitation as well as idiopathic cardiomyopathy which I suspect is due to ischemic heart disease   · Pharmacological Management: Not indicated             Shortness of breath with mild exertion: Likely due to her severe mitral regurgitation as well as idiopathic cardiomyopathy which I suspect is due to ischemic heart disease     Essential Hypertension: Controlled  Beta Blocker: Continue Metoprolol succinate (Toprol XL) 25 mg daily. ACE Inibitor/ARB: Continue losartan (Cozaar) 25 mg daily. Calcium Channel Blocker: Not indicated at this time. Diuretics: Not indicated at this time. Additional Testing List: None    · Hyperlipidemia: LDL on 5/26/2022: 77 mg/dL  · Cholesterol Reduction Therapy: START Atorvastatin (Lipitor) 20 mg daily. · Severe Mitral Regurgitation: Likely symptomatic with a estimated RVSP of 96 which is severely elevated. Unclear etiology but given low ER this is likely very serious and her options to treat this may be limited  · Continue Toprol XL 25 mg daily   · Continue lostartan 25 mg daily     · Ischemic Cardiomyopathy: CAD and severe mitral regurgitation   Continue follow up with Dr Amie Mendiola on Thursday. They would like to proceed with a BANDAR while she is there for her appointment Thursday, I have placed the order. Once again, thank you for allowing me to participate in this patients care. Please do not hesitate to contact me if I could be of any further assistance. I discussed patient's symptoms and treatment plan with Dr Ernst Rivero, he was in agreement with the plan and follow up. Sincerely,  [facility] Cardiology Specialist    [address] [facility], [address] [place] [address] Avenue  Phone: [phone], Fax: [phone]     I believe that the risk of significant morbidity and mortality related to the patient's current medical conditions are: high. Approximately 45 minutes were spent during prep work, discussion and exam of the patient, and follow up documentation and all of their questions were answered.       June 20, 2022

## 2022-06-20 NOTE — PATIENT INSTRUCTIONS
SURVEY:    You may be receiving a survey from [employer] regarding your visit today. Please complete the survey to enable us to provide the highest quality of care to you and your family. If you cannot score us a very good on any question, please call the office to discuss how we could have made your experience a very good one. Thank you.

## 2022-06-20 NOTE — TELEPHONE ENCOUNTER
Dr Palomo Solares would like to have BANDAR done also, is it ok to place order. Spoke to [employer] she was in agreement. Order placed.

## 2022-06-23 NOTE — H&P
[address] [address]  Sedation/Analgesia History & Physical    Pt Name: Jr Pereira  Account number: [de-identified]  MRN: [id number]  YOB: 1940  Provider Performing Procedure: Mely Muniz MD MD  Referring Provider: Mely Muniz MD   Primary Care Physician: Chilango Haley MD  Date: 6/23/2022    PRE-PROCEDURE    Code Status: FULL CODE  Brief History/Pre-Procedure Diagnosis: MR     Consent: : I have discussed with the patient risks, benefits, and alternatives (and relevant risks, benefits, and side effects related to alternatives or not receiving care), and likelihood of the success. The patient and/or representative appear to understand and agree to proceed. The discussion encompasses risks, benefits, and side effects related to the alternatives and the risks related to not receiving the proposed care, treatment, and services. The indication, risks and benefits of the procedure and possible therapeutic consequences and alternatives were discussed with the patient. The patient was given the opportunity to ask questions and to have them answered to his/her satisfaction. Risks of the procedure include but are not limited to the following: Bleeding, hematoma including retroperitoneal hemmorhage, infection, pain and discomfort, injury to the aorta and other blood vessels, rhythm disturbance, low blood pressure, myocardial infarction, stroke, kidney damage/failure, myocardial perforation, allergic reactions to sedatives/contrast material, loss of pulse/vascular compromise requiring surgery, aneurysm/pseudoaneurysm formation, possible loss of a limb/hand/leg, needing blood transfusion, requiring emergent open heart surgery or emergent coronary intervention, the need for intubation/respiratory support, the requirement for defibrillation/cardioversion, and death. Alternatives to and omission of the suggested procedure were discussed.  The patient had no further questions and wished to proceed; the consent form was signed. MEDICAL HISTORY   has a past medical history of WEI (acute kidney injury) ([facility] Aortic stenosis, severe, Diabetes mellitus ([facility] Dilated cardiomyopathy ([facility] Hypertension, Hypothyroidism, Mild mitral stenosis, Mild-Severe tricuspid valve regurgitation, and Severe pulmonary hypertension ([address] SURGICAL HISTORY   has a past surgical history that includes Appendectomy (1964); Tonsillectomy (1959); Ovary removal (Right, 1964); Cataract removal with implant (Bilateral); eye surgery; and Cardiac catheterization. Additional information:       ALLERGIES   Allergies as of 06/20/2022 - Fully Reviewed 06/20/2022   Allergen Reaction Noted    Milk-related compounds Nausea Only 05/25/2022     Additional information:       MEDICATIONS     Current Facility-Administered Medications:     0.9 % sodium chloride infusion, , IntraVENous, Continuous, Ramandeep Swift MD, Last Rate: 75 mL/hr at 06/23/22 1032, New Bag at 06/23/22 1032  Prior to Admission medications    Medication Sig Start Date End Date Taking?  Authorizing Provider   aspirin EC 81 MG EC tablet Take 1 tablet by mouth daily 6/20/22   Blanche Li PA-C   atorvastatin (LIPITOR) 20 MG tablet Take 1 tablet by mouth daily 6/20/22   Blanche Li PA-C   losartan (COZAAR) 25 MG tablet Take 1 tablet by mouth daily 5/28/22   SHANNON Medrano CNP   metoprolol succinate (TOPROL XL) 25 MG extended release tablet Take 1 tablet by mouth daily 5/28/22   SHANNON Medrano CNP   levothyroxine (SYNTHROID) 100 MCG tablet Take 1 tablet by mouth Daily 5/27/22   SHANNON Medrano CNP   meclizine (BONINE) 25 MG CHEW Take 12.5 mg by mouth 3 times daily as needed (dizziness)    Historical Provider, MD   acetaminophen (TYLENOL) 325 mg tablet Take 650 mg by mouth every 6 hours as needed for Pain    Historical Provider, MD   Multiple Vitamins-Minerals (VISION VITAMINS PO) Take 1 tablet by mouth daily Vision Multi 50+ Historical Provider, MD     Additional information:       VITAL SIGNS   Vitals:    06/23/22 1039   BP:    Pulse: 67   Resp: 24   Temp:    SpO2:        PHYSICAL:   General: No acute distress  HEENT:  Unremarkable for age  Neck: without increased JVD, carotid pulses 2+ bilaterally without bruits  Heart: RRR, S1 & S2 WNL, S4 gallop, 3/6 HSM   NYHA: 3  Lungs: Clear to auscultation    Abdomen: BS present, without HSM, masses, or tenderness    Extremities: without C,C,E.  Pulses 2+ bilaterally  Mental Status: Alert & Oriented        PLANNED PROCEDURE   []Cath  []PCI                []Pacemaker/AICD  [x]BANDAR             []Cardioversion []Peripheral angiography/PTA  []Other:      SEDATION  Planned agent:[x]Midazolam []Meperidine [x]Sublimaze []Morphine  []Diazepam  []Other:     ASA Classification:  []1 []2 [x]3 []4 []5  Class 1: A normal healthy patient  Class 2: Pt with mild to moderate systemic disease  Class 3: Severe systemic disease or disturbance  Class 4: Severe systemic disorders that are already life threatening. Class 5: Moribund pt with little chances of survival, for more than 24 hours. Mallampati I Airway Classification:   []1 []2 [x]3 []4    [x]Pre-procedure diagnostic studies complete and results available. Comment:    [x]Previous sedation/anesthesia experiences assessed. Comment:    [x]The patient is an appropriate candidate to undergo the planned procedure sedation and anesthesia. (Refer to nursing sedation/analgesia documentation record)  [x]Formulation and discussion of sedation/procedure plan, risks, and expectations with patient and/or responsible adult completed. [x]Patient examined immediately prior to the procedure.  (Refer to nursing sedation/analgesia documentation record)    Ramandeep Swift MD MD   Electronically signed 6/23/2022 at 11:08 AM

## 2022-06-24 NOTE — CONSULTS
[facility]    Patient's Name/Date of Birth: Yanira Garay / 1940 (65 y.o.)    Date: June 23, 2022     Referring Provider: Annelle Baumgarten    CHIEF COMPLAINT:  Severe MR, low EF      HPI: This is a pleasant 80 y.o. female presents with complaints of progressive rosales and fatigue. She has low energy. Noticed this over the last 1 month. Has also noted more and more LE swelling. She is able to lay flat, no PND. She was recently admitted with presycnope and CHF. She was started on OMT. Cardiac cath was done showing 3VD. Patient does not want OHS despite much discussion previously with her primary cardiologist.  She is a [employer]. She has some of her teeth. Cr 1.46. Hgb 10.8. ECG with NSR, PVCS, no LBBB. No major malignancy. Her sister helps with her care. She has no children. BANDAR today demonstrated severe MR, EF 30-35%. She is on BB and ARB. She is also on statin and ASA. No chest pain or angina.              Echo: Results for orders placed during the hospital encounter of 05/25/22    ECHO Complete 2D W Doppler W Color    Narrative  [address] [place] [address]    Transthoracic Echocardiography Report (TTE)    Patient Name [facility]      Date of Study           05/26/2022  Josehung Castillo     Date of      1940  Gender                  Female  Birth    Age          80 year(s)  Race                        Room Number  0301        Height:                 64 inch, 162.56 cm    Corporate ID [id number]   Weight:                 124 pounds, 56.2 kg  #    Patient Acct [de-identified]   BSA:        1.6 m^2     BMI:         21.28 kg/m^2  #    MR #         [id number]      Sonographer             Araceli Marie    Accession #  [id number]  Interpreting Physician  Rossana Lowry    Fellow                   Referring Nurse  Practitioner    Interpreting             Referring Physician     Kia Ferguson,  Ivana YO *    Type of Study    TTE procedure:2D Echocardiogram, M-Mode, Doppler, Color Doppler. Procedure Date  Date: 05/26/2022 Start: 09:10 AM    Study Location: [facility]    Indications:Congestive heart failure. History / Tech. Comments:  CHF  PMHX: HTN, DM    Patient Status: Inpatient    Height: 64 inches Weight: 124 pounds BSA: 1.6 m^2 BMI: 21.28 kg/m^2    BP: 123/75 mmHg    CONCLUSIONS    Summary  Global left ventricular systolic function appears mildly to moderately  reduced with an estimated ejection fraction of 35-40%. The inferoseptal and inferior walls appear hypokinetic in their motion. The left ventricular cavity size is within normal limits and the left  ventricular wall thickness is moderately increased. No definite specific wall motion abnormalities were identified. The left atrium is severely dilated (>40) with a left atrial volume index of  45 ml/m2. Moderate myxomatous thickening of the mitral leaflets with mild mitral  stenosis. Severe central and posteriorly directly mitral regurgitation mitral  regurgitation. Severe pulmonary hypertension with an estimated right ventricular systolic  pressure of 96 mmHg. Moderate to severe tricuspid regurgitation. Evidence of severe (grade III) diastolic dysfunction is seen. No previous studies were available for comparison. Based on the patients severe valvular heart disease, consider additional  testing and/or treatment for possible corrective valve surgery if clinically  indicated. If the patients reduced ejection fraction is a new finding, consider  additional ischemic cardiac testing if clinically indicated.     Signature  ----------------------------------------------------------------------------  Electronically signed by Pauly MarieSonographer) on 05/26/2022 09:42  AM  ----------------------------------------------------------------------------    ----------------------------------------------------------------------------  Electronically signed by Grant Sosa(Interpreting physician) on  2022 01:52 PM  ----------------------------------------------------------------------------  FINDINGS  Left Atrium  The left atrium is severely dilated (>40) with a left atrial volume index of  45 ml/m2. Left Ventricle  Global left ventricular systolic function appears mildly to moderately  reduced with an estimated ejection fraction of 35-40%. The inferoseptal and inferior walls appear hypokinetic in their motion. The left ventricular cavity size is within normal limits and the left  ventricular wall thickness is moderately increased. No definite specific wall motion abnormalities were identified. Right Atrium  The right atrium appears mildly dilated. Right Ventricle  Normal right ventricular size and function. Mitral Valve  Moderate myxomatous thickening of the mitral leaflets with mild mitral  stenosis. Severe central and posteriorly directly mitral regurgitation mitral  regurgitation. Aortic Valve  Normal aortic valve structure with trivial aortic regurgitation. Tricuspid Valve  Severe pulmonary hypertension with an estimated right ventricular systolic  pressure of 96 mmHg. Moderate to severe tricuspid regurgitation. Pulmonic Valve  The pulmonic valve is normal in structure. Pericardial Effusion  No significant pericardial effusion is seen. Miscellaneous  Evidence of severe (grade III) diastolic dysfunction is seen. Normal aortic root dimension.     M-mode / 2D Measurements & Calculations:    LVIDd:5.31 cm(3.7 - 5.6 cm)      Diastolic MKDUGI:814.77 ml  LVIDs:4.4 cm(2.2 - 4.0 cm)       Systolic VPXEQJ:26.4 ml  HCWS:6.91 cm(0.6 - 1.1 cm)       Aortic Root:2.88 cm(2.0 - 3.7 cm)  LVPWd:1.31 cm(0.6 - 1.1 cm)      LA Dimension: 5.08 cm(1.9 - 4.0 cm)  Fractional Shortenin.14 %    LA volume/Index: 72 ml /45m^2  Calculated LVEF (%): 43.11 %     AV Cusp Separation: 1.64 cm  RVDd:2.6 cm    Mitral:                                 Aortic    Valve Area (P1/2-Time): 4.32 cm^2       Peak Velocity: 1.32 m/s  Peak E-Wave: 1.68 m/s                   Mean Velocity: 0.92 m/s  Peak A-Wave: 1.34 m/s                   Peak Gradient: 6.97 mmHg  E/A Ratio: 1.26                         Mean Gradient: 3.74 mmHg  Peak Gradient: 11.31 mmHg  Mean Gradient: 5.75 mmHg                Acceleration Time: 87.02 msec  P1/2t: 50.98 msec  AV VTI: 21.33 cm    Tricuspid:                              Pulmonic:    Estimated RVSP: 95.98 mmHg  Peak TR Velocity: 4.69 m/s  Peak TR Gradient: 87.9844 mmHg  Estimated RA Pressure: 8 mmHg  Estimated PASP: 95.98 mmHg    Diastology / Tissue Doppler    Lateral Wall E' velocity:0.07 m/s  Lateral Wall E/E':24.17       All labs, EKG's, diagnostic testing and images as well as cardiac cath, stress testing were reviewed during this encounter    Past Medical History:   Diagnosis Date    WEI (acute kidney injury) ([facility]) 05/26/2022    Aortic stenosis, severe 05/26/2022    Diabetes mellitus ([facility]) 2020    Dilated cardiomyopathy ([facility]) 05/26/2022    Hypertension     Hypothyroidism 1968    Mild mitral stenosis 05/26/2022    Mild-Severe tricuspid valve regurgitation 05/26/2022    Severe pulmonary hypertension ([facility]) 05/26/2022     Past Surgical History:   Procedure Laterality Date    APPENDECTOMY  1964    CARDIAC CATHETERIZATION      CATARACT REMOVAL WITH IMPLANT Bilateral     2006 & 2008    EYE SURGERY      OVARY REMOVAL Right 1964    TONSILLECTOMY  1959     No current facility-administered medications for this encounter.      Current Outpatient Medications   Medication Sig Dispense Refill    aspirin EC 81 MG EC tablet Take 1 tablet by mouth daily 30 tablet 3    atorvastatin (LIPITOR) 20 MG tablet Take 1 tablet by mouth daily 90 tablet 1    losartan (COZAAR) 25 MG tablet Take 1 tablet by mouth daily 30 tablet 3    metoprolol succinate (TOPROL XL) 25 MG extended release tablet Take 1 tablet by mouth daily 30 tablet 3    levothyroxine (SYNTHROID) 100 MCG tablet Take 1 tablet by mouth Daily 30 tablet 3    meclizine (BONINE) 25 MG CHEW Take 12.5 mg by mouth 3 times daily as needed (dizziness)      acetaminophen (TYLENOL) 325 mg tablet Take 650 mg by mouth every 6 hours as needed for Pain      Multiple Vitamins-Minerals (VISION VITAMINS PO) Take 1 tablet by mouth daily Vision Multi 50+       Prior to Admission medications    Medication Sig Start Date End Date Taking? Authorizing Provider   aspirin EC 81 MG EC tablet Take 1 tablet by mouth daily 6/20/22   Kendall Rizzo PA-C   atorvastatin (LIPITOR) 20 MG tablet Take 1 tablet by mouth daily 6/20/22   Shayla Hallman PA-C   losartan (COZAAR) 25 MG tablet Take 1 tablet by mouth daily 5/28/22   SHANNON Schultz CNP   metoprolol succinate (TOPROL XL) 25 MG extended release tablet Take 1 tablet by mouth daily 5/28/22   SHANNON Schultz CNP   levothyroxine (SYNTHROID) 100 MCG tablet Take 1 tablet by mouth Daily 5/27/22   SHANNON Schultz CNP   meclizine (BONINE) 25 MG CHEW Take 12.5 mg by mouth 3 times daily as needed (dizziness)    Historical Provider, MD   acetaminophen (TYLENOL) 325 mg tablet Take 650 mg by mouth every 6 hours as needed for Pain    Historical Provider, MD   Multiple Vitamins-Minerals (VISION VITAMINS PO) Take 1 tablet by mouth daily Vision Multi 50+    Historical Provider, MD   Scheduled Meds:  Continuous Infusions:  PRN Meds:.     Allergies   Allergen Reactions    Milk-Related Compounds Nausea Only     Family History   Problem Relation Age of Onset    Hypertension Mother     Diabetes Mother     Heart Disease Mother     Lung Cancer Father     Heart Disease Sister     Colon Cancer Sister      Social History     Socioeconomic History    Marital status:      Spouse name: Not on file    Number of children: Not on file    Years of education: Not on file    Highest education level: Not on file   Occupational History    Not on file   Tobacco Use    Smoking status: Passive Smoke Exposure - Never Smoker    Smokeless tobacco: Never Used   Vaping Use    Vaping Use: Never used   Substance and Sexual Activity    Alcohol use: Not Currently    Drug use: Never    Sexual activity: Never   Other Topics Concern    Not on file   Social History Narrative    Not on file     Social Determinants of Health     Financial Resource Strain: Low Risk     Difficulty of Paying Living Expenses: Not hard at all   Food Insecurity: No Food Insecurity    Worried About Running Out of Food in the Last Year: Never true    [address] in the Last Year: Never true   Transportation Needs:     Lack of Transportation (Medical): Not on file    Lack of Transportation (Non-Medical): Not on file   Physical Activity:     Days of Exercise per Week: Not on file    Minutes of Exercise per Session: Not on file   Stress:     Feeling of Stress : Not on file   Social Connections:     Frequency of Communication with Friends and Family: Not on file    Frequency of Social Gatherings with Friends and Family: Not on file    Attends Rastafari Services: Not on file    Active Member of [address] [place] [address] or Organizations: Not on file    Attends Club or Organization Meetings: Not on file    Marital Status: Not on file   Intimate Partner Violence:     Fear of Current or Ex-Partner: Not on file    Emotionally Abused: Not on file    Physically Abused: Not on file    Sexually Abused: Not on file   Housing Stability:     Unable to Pay for Housing in the Last Year: Not on file    Number of [place] in the Last Year: Not on file    Unstable Housing in the Last Year: Not on file     ROS:   Constitutional: Denies any recent wt change. Eyes:  Denies any blurring or double vision, no glaucoma  Ears/Nose/Mouth/Throat:  Denies any chronic sinus/rhinitis, bleeding gums  Cardiovascular:  As described above.     Respiratory:  Denies any frequent cough, wheezing or coughing up blood  Genitourinary:  Denies difficulty with urination and kidney stones  Gastrointestinal:  Denies any chronic problems with abdominal pain, nausea, vomiting or diarrhea  Musculoskeletal:  Denies any joint pain, back pain, or difficulty walking  Integumentary:  Denies any rash  Neurological:  No numbness or tingling  Endocrine:  Denies any polydipsia. Hematologic/Lymphatic:  Denies any hemorrhage or lymphatic drainage problems. Labs:  CBC: No results for input(s): WBC, HGB, HCT, MCV, PLT in the last 72 hours. BMP: No results for input(s): NA, K, CL, CO2, PHOS, BUN, CREATININE, CA, MG in the last 72 hours. Accucheck Glucoses: No results for input(s): POCGLU in the last 72 hours. Cardiac Enzymes: No results for input(s): CKTOTAL, CKMB, CKMBINDEX, TROPONINI in the last 72 hours. PT/INR: No results for input(s): PROTIME, INR in the last 72 hours. APTT: No results for input(s): APTT in the last 72 hours.   Liver Profile:  Lab Results   Component Value Date    AST 17 05/25/2022    ALT 13 05/25/2022    BILITOT 0.38 05/25/2022    ALKPHOS 69 05/25/2022     Lab Results   Component Value Date    CHOL 129 05/26/2022    HDL 32 05/26/2022    TRIG 101 05/26/2022     TSH:   Lab Results   Component Value Date    TSH 0.59 05/25/2022     UA:   Lab Results   Component Value Date    COLORU Yellow 05/25/2022    PHUR 6.0 05/25/2022    WBCUA 0 TO 2 05/25/2022    RBCUA 0 TO 2 05/25/2022    SPECGRAV <1.005 05/25/2022    LEUKOCYTESUR NEGATIVE 05/25/2022    UROBILINOGEN Normal 05/25/2022    BILIRUBINUR NEGATIVE 05/25/2022    GLUCOSEU NEGATIVE 05/25/2022         Physical Exam:  Vitals:    06/23/22 1201   BP: (!) 148/71   Pulse: 70   Resp: 18   Temp:    SpO2: 94%    No intake or output data in the 24 hours ending 06/23/22 2250   General:  No acute distress  Neck: Supple, no JVD, no carotid bruits  Heart: Regular rhythm normal S1 and S2, 3/6 HSM  Lungs: clear to ascultation no rales, wheezes, or rhonchi  Abdomen: positive bowel sounds, soft, non-tender, non-distended, no bruits, no masses  Extremities:no clubbing, cyanosis, 2-3 edema  Neurologic: alert and oriented x 3, cranial nerves 2-12 grossly intact, motor and sensory intact, moving all extremities  Skin: No rashes  Psych: AO x 3, no depression/adali, no pressured speech, normal affect  Lymph: No obvious LAD      Assessment:  Severe, degenerative MR  NYHA III  EF 30-35%  Severe MV CAD  Severe PH    Given the STS scores, age, frailty, comorbidities, and the imaging findings, the patient is currently prohibitive risk for surgical MVR. Discussed transcatheter mitral valve repair (MitraClip) with the patient/family regarding risks, benefits, alternatives to the procedure. The patient understands the associated visit with CT surgeon and also understands the need for PCI/RHC  The patient is FULL CODE. The POA is listed in the chart. We will schedule the above as appropriate. Once complete and appropriate based on the findings, a procedure date will be aligned at [facility], and we will notify the patient of further instructions. We had a long discussion with myself, family, patient, and structural heart coordinators. The family and patient were explained the risks/benefits/alternatives of the procedure, how the procedure works (need for general anesthesia, intubation, BANDAR), the work-up, post-procedural care, and all of the possible complications of the procedure, including, but not limited to vascular injury, debilitating stroke, cardiac perforation, device related thrombosis, device embolization, bleeding, need for open heart surgery, and death. The patient/family would like to pursue MitraClip at our facility and we will work towards this goal.        The patient/family understand that should there be any findings or issues that arise during the evaluation that would preclude MitraClip, that this will need to be evaluated prior to proceeding with a percutaneous approach.   Further, a unified heart team approach will be performed prior to proceeding with MitraClip which includes the patient's primary care givers, cardiologist, and the entire structural heart team (interventionalist, CT surgeons, structural heart NP). The patient and family appeared to understand everything, all of their questions were answered to their satisfaction and they are agreeable to proceed with further evaluation for MitraClip. Thank you for allowing us to participate in the care of this patient. Please do not hesitate to call us with questions.     Electronically signed by Jr Gallegos MD on 6/23/2022 at 10:50 PM    Interventional Cardiology - [facility]

## 2022-06-28 NOTE — TELEPHONE ENCOUNTER
Called and left a message for Nathanael Verduzco the cath  at [facility] for assistance in obtaining the cath images.  Once received will have Dr. Geo Miller review and will move forward with the below request.

## 2022-06-28 NOTE — TELEPHONE ENCOUNTER
----- Message from Murali Barlow MD sent at 6/23/2022 11:04 PM EDT -----  Regarding: clip  I need cath films - cant see bruhl films  Will likely need pci/RHC before hand, surgery can see the same time  Then proceed with clip  Needs ARB washout, entresto start/jardiance/aldactone - maybe she can see CHF clinic same day as I PCI    sp

## 2022-07-05 NOTE — TELEPHONE ENCOUNTER
PCI ordered. Please specify doses for the below meds if she should start them now.     Needs ARB washout, entresto start/jardiance/aldactone

## 2022-07-07 NOTE — TELEPHONE ENCOUNTER
Spoke with patient to schedule left heart cath/PCI for 7/25/22 at 11:00 am, arrival time of 9:00 am. Patient to take all meds as usual on day of procedure. All instructions reviewed via phone and also mailed to patient this date. Pt verbalized understanding. Case scheduled with Edgar De Jesus in cath lab.

## 2022-07-07 NOTE — TELEPHONE ENCOUNTER
Please send message to Ian regarding chf meds or our clinic can do them if he prefers but would like these started before Cath

## 2022-07-08 NOTE — TELEPHONE ENCOUNTER
Faxed over encounter to Dr. John Vasquez office with cover letter asking them how they want us to proceed.

## 2022-07-13 NOTE — TELEPHONE ENCOUNTER
I do not understand what they are asking me. Have them follow whatever their protocol is regarding medications prior to procedures, I do not have any special instructions for her. Thanks.

## 2022-07-18 NOTE — TELEPHONE ENCOUNTER
Orders received from Dr. Alma Odom to schedule this patient with CV/CV Surgery for an evaluation, PFT/6 minute walk test and the CHF clinic prior to the MtiraClip procedure.

## 2022-07-18 NOTE — TELEPHONE ENCOUNTER
Patient notified. She states that she does need teeth extracted with this said we are going to cancel her cath for 7/25/2022 and reschedule once her extraction is complete. Cath lab, Dr. Saldana Public and patient have been notified.

## 2022-08-03 NOTE — PROGRESS NOTES
Theo Loza am scribing for and in the presence of Jeyson Hirsch MD, MS, F.A.C.C..    Patient: Jovita Beckwith  : 1940  Date of Visit: August 3, 2022    REASON FOR VISIT / CONSULTATION: Follow-up (HX: CAD, HTN, HLD, CHF. Pt states she is doing better. Denies: CP, SOB, Lightheaded/dizziness, Palpitaiotns. )      History of Present Illness:        Dear Miguel Jiménez MD    I had the pleasure of seeing Ms. Yamilet Talbert is a 80 y.o. female for follow up today. She was admitted to the hospital with dizziness on 2022 which is when she first started seeing a cardiologist. This has been an on and off issue for her for the last 8 years or so. Her last episode was about a year ago. She denies ever being told herself having heart problems. Her mother had heart disease, CHF towards end of life, and a sister had valve problems and surgery. She had diabetes for 20 years. In her chart she has the diagnosis of both hypertension and hyperlipidemia. Echo 2022: Alverna Lot left ventricular systolic function appears mildly to moderately reduced with an estimated ejection fraction of 35-40%. The inferoseptal and inferior walls appear hypokinetic in their motion. The left ventricular cavity size is within normal limits and the left  ventricular wall thickness is moderately increased. The left atrium is severely dilated (>40) with a left atrial volume index of 45 ml/m2. Moderate myxomatous thickening of the mitral leaflets with mild mitral stenosis. Severe central and posteriorly directly mitral regurgitation mitral regurgitation. Severe pulmonary hypertension with an estimated right ventricular systolic pressure of 96 mmHg. Moderate to severe tricuspid regurgitation. Evidence of severe (grade III) diastolic dysfunction is seen. Heart Catheterization done 2022: Severe three vessel disease involving the LAD, D1 branch of the LAD and  of the proximal circumflex and right coronary arteries. Normal LVEDP. Patient also with known Severe mitral regurgitation with an EF of 30-35%. She then went to see Dr. Shana Garcia for consultation for a mitral valve clip and also for FFR of the LAD. BANDAR done on 6/23/2022. Ms. Luís Tirado is here today for follow up after her consultation with Dr. Shana Garcia as above. She is doing great over all. She denied any chest pain, pressure or tightness. She also denied any shortness of breath. She also denied any heart palpations, lightheaded or dizziness. She did reports that she quite taking her Lipitor due to her thinking that it spiked her sugar readings. She also denied any abdominal pain, bleeding problems, problems with her medications or any other concerns at this time. Past Medical History:   Diagnosis Date    WEI (acute kidney injury) ([facility] 05/26/2022    Aortic stenosis, severe 05/26/2022    Diabetes mellitus ([facility] 2020    Dilated cardiomyopathy ([facility] 05/26/2022    Hypertension     Hypothyroidism 1968    Mild mitral stenosis 05/26/2022    Mild-Severe tricuspid valve regurgitation 05/26/2022    Severe pulmonary hypertension ([facility] 05/26/2022       CURRENT ALLERGIES: Milk-related compounds REVIEW OF SYSTEMS: 14 systems were reviewed. Pertinent positives and negatives as above, all else negative. Past Surgical History:   Procedure Laterality Date    [place]      CATARACT REMOVAL WITH IMPLANT Bilateral     2006 & 2008    EYE SURGERY      OVARY REMOVAL Right 1964    TONSILLECTOMY  1959    TRANSESOPHAGEAL ECHOCARDIOGRAM N/A 6/23/2022    TRANSESOPHAGEAL ECHOCARDIOGRAM performed by Cecilia Lozano MD at [facility]    Social History:  Social History     Tobacco Use    Smoking status: Never     Passive exposure: Yes    Smokeless tobacco: Never   Vaping Use    Vaping Use: Never used   Substance Use Topics    Alcohol use: Not Currently    Drug use: Never        MEDICATIONS:  No current facility-administered medications for this visit.     CURRENT INPATIENT sounds normal. No respiratory distress. She has no wheezes, rhonchi or rales. Abdominal: Soft, non-tender. She exhibits no organomegaly, mass or bruit. Extremities: None. No cyanosis or clubbing. 2+ radial and carotid pulses. Distal extremity pulses: 2+ bilaterally. Neurological: Alertness and orientation as per Constitutional exam. No evidence of gross cranial nerve deficit. Coordination appeared normal.   Skin: Skin is warm and dry. There is no rash or diaphoresis. Psychiatric: She has a normal mood and affect.  Her speech is normal and behavior is normal.      MOST RECENT LABS ON RECORD:   Lab Results   Component Value Date    WBC 6.3 07/12/2022    HGB 12.5 07/12/2022    HCT 41.2 07/12/2022     07/12/2022    CHOL 84 07/12/2022    TRIG 83 07/12/2022    HDL 26 (L) 07/12/2022    ALT 21 07/12/2022    AST 24 07/12/2022     07/12/2022    K 4.5 07/12/2022     07/12/2022    CREATININE 2.09 (H) 07/12/2022    BUN 22 07/12/2022    CO2 22 07/12/2022    TSH 0.59 05/25/2022    LABA1C 7.5 (H) 07/12/2022     ASSESSMENT:  Patient Active Problem List    Diagnosis Date Noted    Ischemic cardiomyopathy 06/09/2022    Severe mitral regurgitation by prior echocardiogram     Idiopathic cardiomyopathy (HCC)     WEI (acute kidney injury) ([address] 05/26/2022    Severe pulmonary hypertension ([address] 05/26/2022    Mild-Severe tricuspid valve regurgitation 05/26/2022    Mild mitral stenosis 05/26/2022    Aortic stenosis, severe 05/26/2022    Near syncope 05/25/2022    Diabetes mellitus ([address] 05/25/2022    Hypertension 05/25/2022    Hypothyroidism 05/25/2022    Probable prerenal azotemia - however no baseline creatinine level in Epic 05/25/2022    Dilated cardiomyopathy ([address] - EF 35-40% 05/26/2022      PLAN:  Atherosclerotic Heart Disease: Heart Cath done on 6/9/2022 showed severe three vessel coronary artery disease involving a 100% proximal RCA occlusion with left to right filling of the mid and distal vessel, 100% proximal Cx occlusion with left to left filling of a large OM1 and a 60-70% LAD stenosis with a 90% D1 ostial stenosis. Normal left ventricular end diastolic pressure. The patient also has a 30-35% EF with severe mitral regurgitation and an estimated RVSP 2 weeks ago of 96 mmHG. She then went to see Dr. Feliberto Almazan for consultation for a mitral valve clip and also for FFR of the LAD. She is asymptotic at this time. We will have her continue to follow up with Dr. Feliberto Almazan until after procedure. Antiplatelet Agent: Continue Aspirin 81 mg daily. Beta Blocker: Continue Metoprolol succinate (Toprol XL) 25 mg daily. Cholesterol Reduction Therapy: DECREASE Atorvastatin (Lipitor) 10 mg daily. She does have severe blockages at this time as above and she was agreeable to restarting on the lower dose. Additional counseling: I advised them to call our office or go to the emergency room if they developed worsening or persistent chest pain or increased shortness of breath as this could be life threatening. Severe Mitral Regurgitation: S/p BANDAR done on 6/23/2022 with Dr. Feliberto Almazan. EF estimated at 30-35%, left atrial size was severely dilated with no thrombus identified. Mean gradient 4 mmHg--related to high flow. Continue follow up with Dr. Feliberto Almazan for clipping of the valve. Beta Blocker: Continue Metoprolol succinate (Toprol XL) 25 mg daily. ACE Inibitor/ARB: Continue losartan (Cozaar) 25 mg daily. Ischemic Cardiomyopathy New York Heart Association Class: IIb   Beta Blocker: Continue Metoprolol succinate (Toprol XL) 25 mg daily. ACE Inibitor/ARB: Continue losartan (Cozaar) 25 mg daily. Heart failure counseling: I advised them to try and keep their legs up whenever possible and to limit salt in their diet. Essential Hypertension: Controlled  Beta Blocker: Continue Metoprolol succinate (Toprol XL) 25 mg daily. ACE Inibitor/ARB: Continue losartan (Cozaar) 25 mg daily.      Hyperlipidemia: Mixed, LDL done on 7/12/2022 was 41 mg/dL   Cholesterol Reduction Therapy: DECREASE Atorvastatin (Lipitor) 10 mg daily as above. Once again, thank you for allowing me to participate in this patients care. Please do not hesitate to contact me if I could be of any further assistance. FOLLOW UP:   I told Ms. Caesar Storey to call my office if she had any problems, but otherwise told her to Return in about 5 weeks (around 9/7/2022). However, I would be happy to see her sooner should the need arise. Sincerely,  Walter Simon MD, MS, [facility]    [address] [place] [address]  Phone: [phone], Fax: [phone]     I believe that the risk of significant morbidity and mortality related to the patient's current medical conditions are: Intermediate. The documentation recorded by the scribe, accurately and completely reflects the services I personally performed and the decisions made by me. Demi Trimble MD, MS, F.A.C.C.  August 3, 2022

## 2022-08-03 NOTE — PATIENT INSTRUCTIONS
SURVEY:    You may be receiving a survey from [employer] regarding your visit today. Please complete the survey to enable us to provide the highest quality of care to you and your family. If you cannot score us a very good on any question, please call the office to discuss how we could have made your experience a very good one. Thank you.

## 2022-08-04 NOTE — TELEPHONE ENCOUNTER
Patient had her teeth extracted on Monday by Dr. Ophelia Hall in [facility]. Dental Clearance sent. Once we receive this clearance we will schedule the cath. Patient notified.

## 2022-08-09 NOTE — PATIENT INSTRUCTIONS
You may receive a survey regarding the care you received during your visit. Your input is valuable to us. We encourage you to complete and return your survey. We hope you will choose us in the future for your healthcare needs. Continue:  Continue current medications  Daily weights and record  Fluid restriction of 2 Liters per day  Limit sodium in diet to around 7099-0304 mg/day  Monitor BP  Activity as tolerated     Call the [facility] Failure Clinic for any of the following symptoms: [phone]  Weight gain of 2-3 pounds in 1 day or 5 pounds in 1 week  Increased shortness of breath  Shortness of breath while laying down  Cough  Chest pain  Swelling in feet, ankles or legs  Tenderness or bloating in the abdomen  Fatigue   Decreased appetite or nausea   Confusion      Repeat blood work today    No med changes today     Continue diet/fluid adherence  Continue daily wts.   F/U w/ Cardiology  F/U in clinic in [place]

## 2022-08-09 NOTE — TELEPHONE ENCOUNTER
Labs reviewed, Cr is back to 1.4 which does give us room to try the medications we talked about.  I would like to try Entresto first. I will call that in to check cost

## 2022-08-09 NOTE — TELEPHONE ENCOUNTER
Patient does not have Rx coverage  Cost for [facility] is $816  Genaro Bates can you see if patient qualifies for [facility] patient assistance?

## 2022-08-09 NOTE — PROGRESS NOTES
CT surgery New Patient Office Appointment    8/9/2022 11:26 AM    Patient's Name/Date of Birth: Nina Topete / 1940 (40 y.o.)    PCP: Tiffany Harmon MD    Date: August 9, 2022     CC:   Shortness of breath and recent syncopal episode  Chief Complaint   Patient presents with    Other          HPI  We had the pleasure of seeing Nina Topete in the office today, as you know this is a very pleasant 80y.o. year old female with a history of severe mitral regurgitation. PMHx including DM, HTN, Dilated Cardiomyopathy, hypothyroidism, recent WEI and Pulmonary HTN. Recent hospital admission for syncopal episode and fall on 5/25/22. She has noted progressive shortness of breath with activities, easy fatigue and swelling in her ankles. PastMedical History:  Severe Mitral Regurg  DM  HTN  Idiopathic Cardiomyopathy  Hypothyroidism  Pulmonary HTN  Past Surgical History:  The patient  has a past surgical history that includes Appendectomy (1964); Tonsillectomy (1959); Ovary removal (Right, 1964); Cataract removal with implant (Bilateral); eye surgery; Cardiac catheterization; and transesophageal echocardiogram (N/A, 6/23/2022). Allergies: The patient is allergic to milk-related compounds.     Medications:    Current Outpatient Medications:     atorvastatin (LIPITOR) 10 MG tablet, Take 1 tablet by mouth in the morning., Disp: 90 tablet, Rfl: 3    aspirin EC 81 MG EC tablet, Take 1 tablet by mouth daily, Disp: 30 tablet, Rfl: 3    losartan (COZAAR) 25 MG tablet, Take 1 tablet by mouth daily, Disp: 30 tablet, Rfl: 3    metoprolol succinate (TOPROL XL) 25 MG extended release tablet, Take 1 tablet by mouth daily, Disp: 30 tablet, Rfl: 3    levothyroxine (SYNTHROID) 100 MCG tablet, Take 1 tablet by mouth Daily, Disp: 30 tablet, Rfl: 3    meclizine (ANTIVERT) 25 MG CHEW, Take 12.5 mg by mouth 3 times daily as needed (dizziness), Disp: , Rfl:     acetaminophen (TYLENOL) 325 mg tablet, Take 650 mg by mouth every 6 hours as needed for Pain, Disp: , Rfl:     Multiple Vitamins-Minerals (VISION VITAMINS PO), Take 1 tablet by mouth daily Vision Multi 50+, Disp: , Rfl:     Family History: This patient's family history includes Colon Cancer in her sister; Diabetes in her mother; Heart Disease in her mother and sister; Hypertension in her mother; [place] in her father. Social History:  Suraj Cali  reports that she has never smoked. She has been exposed to tobacco smoke. She has never used smokeless tobacco. She reports that she does not currently use alcohol. She reports that she does not use drugs. Imaging:  BANDAR:6/23/22  Transesophageal Echocardiography Report (BANDAR)      Demographics      Patient Name   Lakia Roche Gender              Female                  A      MR #           [id number]       Race                                                     Ethnicity      Account #      [de-identified]       Room Number      Accession      [id number]      Date of Study       06/23/2022   Number      Date of Birth  1940      Referring Physician Faisal Kraft      Age            80 year(s)      SHAVONNE ReyesT                                     Interpreting        Baljinder Sexton MD                                  Physician     Procedure     Type of Study      BANDAR procedure:ECHOCARDIOGRAM TRANSESOPHAGEAL. Procedure Date  Date: 06/23/2022 Start: 10:54 AM     Study Location: Endoscopy     Indications:Evaluate mitral valve for degree of regurgitation. Additional Medical History:cardiomyopathy, diabetes, hypertension, pulmonary  hypertension     Patient Status: Routine     Height: 64 inches Weight: 122 pounds BSA: 1.59 m^2 BMI: 20.94 kg/m^2     BP: 110/71 mmHg     Procedure Medications    - Fentanyl 50 mcg.       - Versed 1 mg. Procedure Descriptor:-The transesophageal approach was used.   -Probe inserted with no complications by cardiologist.  -The study included complete 2D imaging, complete spectral doppler, and  color doppler.  -Procedure performed without complications. Conclusions      Summary   Probe easily inserted by Cardiologist.   Procedure performed without complications. The transesophageal approach was used. Risk benefits and alternatives were explained to the patient and informed   consent was obtained. Mild-moderately dilated left ventricular size and severely reduced   systolic function. There was severe global hypokinesis. Wall thickness was within normal limits. Ejection fraction was estimated at 30-35%. Left atrial size was severely dilated with no thrombus identified. APPENDAGE: The left atrial appendage size was normal with no thrombus   identified. DOPPLER: The function was normal (normal emptying velocity). The mitral valve structure demonstrated bileaflet thickening, PMVL   restriction, malcoaptation. DOPPLER: The transmitral velocity was within   the normal range with no evidence for mitral stenosis (mean gradient 4   mmHg--related to high flow). 3D analysis confirms the above, there also   maybe a cleft between P1 and P2; majority of regurgitation is on the   lateral side of A2-P2. There was severe mitral regurgitation (EROA = 0.4   cm2, RV 71 cc). Signature      ----------------------------------------------------------------   Electronically signed by Mortimer Pinion MD (Interpreting   physician) on 06/24/2022 at 04:54 PM   ----------------------------------------------------------------      Findings      Mitral Valve   The mitral valve structure demonstrated bileaflet thickening, PMVL   restriction, malcoaptation. DOPPLER: The transmitral velocity was within   the normal range with no evidence for mitral stenosis (mean gradient 4   mmHg--related to high flow).  3D analysis confirms the above, there also   maybe a cleft between P1 and P2; majority of regurgitation is on the   lateral side of A2-P2. There was severe mitral regurgitation (EROA = 0.4   cm2, RV 71 cc). Aortic Valve   The aortic valve was trileaflet with normal thickness and cuspal   separation. There was no echocardiographic evidence of a vegetation. DOPPLER: Transaortic velocity was within the normal range with no evidence   of aortic stenosis or regurgitaiton. Tricuspid Valve   The tricuspid valve structure was normal with normal leaflet separation. There was no echocardiographic evidence of a vegetation. DOPPLER: There   was no evidence of tricuspid stenosis. Mild tricuspid regurgitation. Pulmonic Valve   The pulmonic valve leaflets exhibited normal thickness, no calcification,   and normal cuspal separation. There was no echocardiographic evidence of   vegetation. DOPPLER: The transpulmonic velocity was within the normal   range with no evidence for regurgitation. Left Atrium   Left atrial size was severely dilated with no thrombus identified. APPENDAGE: The left atrial appendage size was normal with no thrombus   identified. DOPPLER: The function was normal (normal emptying velocity). Left Ventricle   Mild-moderately dilated left ventricular size and severely reduced   systolic function. There was severe global hypokinesis. Wall thickness was within normal limits. Ejection fraction was estimated at 30-35%. Right Atrium   Right atrial size was severely dilated with no thrombus identified. ATRIAL   SEPTUM: No defect or patent foramen ovale was identified. There was no   right-to-left shunt, with provocative maneuvers to increase right atrial   pressure. Right Ventricle   The right ventricular size was normal with normal systolic function and   wall thickness. Pericardial Effusion   The pericardium was normal in appearance with no evidence of a pericardial   effusion. Pleural Effusion   No evidence of pleural effusion.       Aorta / Great Vessels   -Aortic root normal rate, regular rhythm, normal S1 and S2, Grade III/VI [place] with no rubs, clicks, or gallops  Pulmonary/Chest: clear to auscultation bilaterally- no wheezes, rales or rhonchi, normal air movement, no respiratory distress  Abdomen:soft, non-tender, non-distended, normal bowel sounds, no bruits,   Extremities: no cyanosis, clubbing or edema. Skin: warm and dry, no rash or erythema  Head: normocephalic and atraumatic  Neck: supple and non-tender without mass, no thyromegaly, no JVD   Musculoskeletal: normal range of motion, no joint swelling, deformity or tenderness. Neurological: alert, oriented, normal speech, no focal findings or movement disorder noted. Peripheral Pulses: 2+ radial and DP pulses palpable, equal bilaterally     Active Problem List:  Patient Active Problem List   Diagnosis    Near syncope    Diabetes mellitus (HCC)    Hypertension    Hypothyroidism    Probable prerenal azotemia - however no baseline creatinine level in Epic    WEI (acute kidney injury) ([facility] [address]    Severe pulmonary hypertension (HCC)    Mild-Severe tricuspid valve regurgitation    Mild mitral stenosis    Aortic stenosis, severe    Dilated cardiomyopathy ([facility]) - EF 35-40%    Severe mitral regurgitation by prior echocardiogram    Idiopathic cardiomyopathy ([address]    Ischemic cardiomyopathy       Assessment:   Severe Mitral Regurgitation    Plan: 8/9/22  80y.o. year-old female with severe symptomatic mitral regurgitation. Patient clearly is at high risk for surgical mitral valve replacement, for reasons of age, frality, STS score, and multiple co-morbidities. Recommend proceed with evaluation for MitraClip. Issues discussed in detail with the patient, who understands and has no further questions. Time spent with patient: 60 minutes, of which more than 50% was spent counseling/coordinating the patient's care.     The plan of care was reviewed in detail by Dr. Jaz Stevens

## 2022-08-10 NOTE — CARE COORDINATION
I called and spoke with the patient and went over the program available for her Tiffanie Ch since she has no insurance. I will be mailing her portion out to her soon.           [facility]   Medication Assistance  [address] [place] [address], and [employer]    (j) [phone] (q) [phone]

## 2022-08-10 NOTE — TELEPHONE ENCOUNTER
Pre TAVr -  [facility] Cardiomyopathy Questionnaire ([id number])  Bri Yee  8/10/2022    The following questions refer to your heart failure and how it may affect your life. Please read and complete the following questions. There is no right or wrong answers. Please paul the answer that best applies to you. Heart failure affects different people in different ways. Some feel shortness of breath while others feel fatigue. Please indicate how much you are limited by heart failure (shortness of breath or fatigue) in your ability to do the following activities over the past 2 weeks. Activity Extremely Limited Quite a bit limited Moderatly Limited Slightly Limited Not at all Limited Limited for other reasons/ did not do the activity   Showering/ Bathing         x     Walking 1 block on Level ground    x     Hurrying or jogging (as if to catch a bus)    x          1         2       3  4       5   6     2. Over the past 2 weeks, how many times did you have swelling in your feet, ankles or legs when you woke up in the morning? Every morning 3 or more times per week, but not every day 1-2 times per week Less than once a week Never over the past 2 weeks         x   `        1          2   3       4                     5           3. Over the past 2 weeks, on average, how many times has fatigue limited your ability to do what you wanted? All of the time Several times per day At least once a day 3 or more times per week 1-2 times per week Less than once a week Never over the past 2 weeks          x             1        2     3  4        5       6  7        4. Over the past 2 weeks, on average, how many times has shortness of breath limited your ability to do what you    wanted?      All of the time Several times per day At least once a day 3 or more times per week 1-2 times per week Less than once a week Never over the past 2 weeks          x             1        2     3  4         5       6  7

## 2022-08-11 NOTE — CARE COORDINATION
I was able to mail the patients portion of the application to her for assistance on her Entresto. Once I get this back I will be able to submit to the .         [facility]   Medication Assistance  [address] [place] [address] and [employer]    L) [phone] (W) [phone]

## 2022-08-15 NOTE — TELEPHONE ENCOUNTER
Patient called and does not want to take the Entresto too costly and has heard bad things about the medication on television. Aster advise?

## 2022-08-16 PROBLEM — N28.9 RENAL INSUFFICIENCY: Status: ACTIVE | Noted: 2022-01-01

## 2022-08-17 NOTE — BRIEF OP NOTE
[address] [address]  Sedation/Analgesia Post Sedation Record    Pt Name: Carter Roman  Account number: [de-identified]  MRN: [id number]  YOB: 1940  Procedure Performed By: MD MD Magdiel Lentz, [address]  Primary Care Physician: Shyam Storey MD  Date: 8/17/2022    POST-PROCEDURE    Physicians/Assistants: MD MD Magdiel Lentz, RPVI    Procedure Performed:Cath/ PCI      Sedation/Anesthesia: Versed/ Fentanyl and 2% xylocaine local anesthesia. Estimated Blood Loss: < 50 ml. Specimens Removed: None         Disposition of Specimen: N/A        Complications: No Immediate Complications.        Post-procedure Diagnosis/Findings:     Severe PH  LAD-DX with T-stenting   LAD - supplies RCA   DX  - supplies LCX  Severe                  MD MD Magdiel Lentz, RPVI  Electronically signed 8/17/2022 at 11:09 AM  Interventional Cardiology

## 2022-08-17 NOTE — H&P
[address] [address]  Sedation/Analgesia History & Physical    Pt Name: Ester Gray  Account number: [de-identified]  MRN: [id number]  YOB: 1940  Provider Performing Procedure: Gustabo Retana MD MD  Referring Provider: Gustabo Retana MD   Primary Care Physician: Juan Pablo Woods MD  Date: 8/17/2022    PRE-PROCEDURE    Code Status: FULL CODE  Brief History/Pre-Procedure Diagnosis:  Severe MR  Not a candidate for OHS    Consent: : I have discussed with the patient risks, benefits, and alternatives (and relevant risks, benefits, and side effects related to alternatives or not receiving care), and likelihood of the success. The patient and/or representative appear to understand and agree to proceed. The discussion encompasses risks, benefits, and side effects related to the alternatives and the risks related to not receiving the proposed care, treatment, and services. The indication, risks and benefits of the procedure and possible therapeutic consequences and alternatives were discussed with the patient. The patient was given the opportunity to ask questions and to have them answered to his/her satisfaction. Risks of the procedure include but are not limited to the following: Bleeding, hematoma including retroperitoneal hemmorhage, infection, pain and discomfort, injury to the aorta and other blood vessels, rhythm disturbance, low blood pressure, myocardial infarction, stroke, kidney damage/failure, myocardial perforation, allergic reactions to sedatives/contrast material, loss of pulse/vascular compromise requiring surgery, aneurysm/pseudoaneurysm formation, possible loss of a limb/hand/leg, needing blood transfusion, requiring emergent open heart surgery or emergent coronary intervention, the need for intubation/respiratory support, the requirement for defibrillation/cardioversion, and death. Alternatives to and omission of the suggested procedure were discussed.  The patient had no further questions and wished to proceed; the consent form was signed. MEDICAL HISTORY   has a past medical history of WEI (acute kidney injury) ([facility] [address] Aortic stenosis, severe, Diabetes mellitus ([address] Dilated cardiomyopathy ([facility] [address] Hypertension, Hypothyroidism, Mild mitral stenosis, Mild-Severe tricuspid valve regurgitation, and Severe pulmonary hypertension ([address] SURGICAL HISTORY   has a past surgical history that includes Appendectomy (1964); Tonsillectomy (1959); Ovary removal (Right, 1964); Cataract removal with implant (Bilateral); eye surgery; Cardiac catheterization; and transesophageal echocardiogram (N/A, 6/23/2022).   Additional information:       ALLERGIES   Allergies as of 08/16/2022 - Fully Reviewed 08/16/2022   Allergen Reaction Noted    Milk-related compounds Nausea Only 05/25/2022     Additional information:       MEDICATIONS     Current Facility-Administered Medications:     sodium chloride flush 0.9 % injection 5-40 mL, 5-40 mL, IntraVENous, 2 times per day, Poonam Munoz APRN - CNP, 10 mL at 08/16/22 2323    sodium chloride flush 0.9 % injection 5-40 mL, 5-40 mL, IntraVENous, PRN, Poonam Munoz, APRN - CNP    0.9 % sodium chloride infusion, , IntraVENous, PRN, Poonam Munoz APRN - CNP    aspirin tablet 325 mg, 325 mg, Oral, Once, Nicolette [place], APRN - CNP    nitroGLYCERIN (NITROSTAT) SL tablet 0.4 mg, 0.4 mg, SubLINGual, Q5 Min PRN, Poonam Munoz APRN - CNP    0.9 % sodium chloride infusion, , IntraVENous, Continuous, Jayna Grant MD, Last Rate: 50 mL/hr at 08/16/22 2323, New Bag at 08/16/22 2323    acetaminophen (TYLENOL) tablet 650 mg, 650 mg, Oral, Q6H PRN, Jayna Grant MD    aspirin EC tablet 81 mg, 81 mg, Oral, Daily, Jayna Grant MD, 81 mg at 08/17/22 8592    atorvastatin (LIPITOR) tablet 10 mg, 10 mg, Oral, Daily, Jayna Grant MD, 10 mg at 08/17/22 0373    levothyroxine (SYNTHROID) tablet 100 mcg, 100 mcg, Oral, Daily, HSM, masses, or tenderness    Extremities: without C,C,E.  Pulses 2+ bilaterally  Mental Status: Alert & Oriented        PLANNED PROCEDURE   [x]Cath  [x]PCI                []Pacemaker/AICD  []BANDAR             []Cardioversion []Peripheral angiography/PTA  [x]Other: RHC    SEDATION  Planned agent:[x]Midazolam []Meperidine [x]Sublimaze []Morphine  []Diazepam  []Other:     ASA Classification:  []1 []2 [x]3 []4 []5  Class 1: A normal healthy patient  Class 2: Pt with mild to moderate systemic disease  Class 3: Severe systemic disease or disturbance  Class 4: Severe systemic disorders that are already life threatening. Class 5: Moribund pt with little chances of survival, for more than 24 hours. Mallampati I Airway Classification:   []1 []2 [x]3 []4    [x]Pre-procedure diagnostic studies complete and results available. Comment:    [x]Previous sedation/anesthesia experiences assessed. Comment:    [x]The patient is an appropriate candidate to undergo the planned procedure sedation and anesthesia. (Refer to nursing sedation/analgesia documentation record)  [x]Formulation and discussion of sedation/procedure plan, risks, and expectations with patient and/or responsible adult completed. [x]Patient examined immediately prior to the procedure.  (Refer to nursing sedation/analgesia documentation record)    Davon Rudolph MD MD   Electronically signed 8/17/2022 at 9:56 AM

## 2022-08-17 NOTE — PROGRESS NOTES
Inpatient Cardiac Rehabilitation Consult    Received consult for Phase II Cardiac Rehabilitation. Patient needs cardiac rehab due to PCI on 8/17/22. Importance of Cardiac Rehab discussed with patient. Patient authorized referral to [facility].  Referral sent

## 2022-08-17 NOTE — PLAN OF CARE
Problem: Discharge Planning  Goal: Discharge to home or other facility with appropriate resources  Outcome: Progressing  Flowsheets  Taken 8/16/2022 2015 by Selene Foote RN  Discharge to home or other facility with appropriate resources: Identify barriers to discharge with patient and caregiver  Taken 8/16/2022 1950 by Angelito Multani RN  Discharge to home or other facility with appropriate resources:   Identify barriers to discharge with patient and caregiver   Refer to discharge planning if patient needs post-hospital services based on physician order or complex needs related to functional status, cognitive ability or social support system  Note: Pt plans to be discharged home when appropriate. Problem: Safety - Adult  Goal: Free from fall injury  Outcome: Progressing  Note: Absence of falls this shift. Fall prevention in place. Fall band applied. Bed alarm activated as needed. Problem: Pain  Goal: Verbalizes/displays adequate comfort level or baseline comfort level  Outcome: Progressing  Note: Pt complains of pain at a 0/10. Non pharmacological interventions completed which include rest, and repositioning. Pt states pain goal at a 0/10. Pain assessed every 4 hours, and as needed. PRN pain medications given as ordered. Care plan reviewed with patient. Patient verbalize understanding of the plan of care and contribute to goal setting.

## 2022-08-17 NOTE — CARE COORDINATION
8/17/22, 7:47 AM EDT  DISCHARGE PLANNING EVALUATION:    Pablo Abel       Admitted: 8/16/2022/ [address] [place] [address] day: 0   Location: -23/023-A Reason for admit: Renal insufficiency [N28.9]   PMH:  has a past medical history of WEI (acute kidney injury) ([facility] [address] Aortic stenosis, severe, Diabetes mellitus ([facility] [address] Dilated cardiomyopathy ([facility] [address] Hypertension, Hypothyroidism, Mild mitral stenosis, Mild-Severe tricuspid valve regurgitation, and Severe pulmonary hypertension ([facility] [address] Procedure: On cath schedule for today. Barriers to Discharge:  Prehydration for Cath. For MitraClip eval. IVF at 50/hr. Creat 1.6 yesterday. Cath performed with PCI/stenting. PCP: Anthony Avendaño MD    Patient's Healthcare Decision Maker: Named in [address] [place] [address]    Patient Goals/Plan/Treatment Preferences: Met with pt this afternoon. She is from home where she resides with a cousin. She is independent. No home services or DME. She has a PCP, she drives and thus far has been able to afford her meds. She is insured but not form medications. Transportation/Food Security/Housekeeping Addressed:  No issues identified.

## 2022-08-17 NOTE — PLAN OF CARE
Problem: Discharge Planning  Goal: Discharge to home or other facility with appropriate resources  8/17/2022 0920 by Justin Gomes RN  Outcome: Progressing  Flowsheets (Taken 8/17/2022 0920)  Discharge to home or other facility with appropriate resources: Identify barriers to discharge with patient and caregiver  Note: Discharge barriers identified with patient. To be discharged home when stable. Problem: Safety - Adult  Goal: Free from fall injury  8/17/2022 0920 by Justin Gomes RN  Outcome: Progressing  Flowsheets  Taken 8/17/2022 0920  Free From Fall Injury: Instruct family/caregiver on patient safety  Taken 8/17/2022 0907  Free From Fall Injury: Instruct family/caregiver on patient safety  Note: Patient free from falls this shift. Call light within reach. Bed alarm on. Falling star program in place. Problem: Pain  Goal: Verbalizes/displays adequate comfort level or baseline comfort level  8/17/2022 0920 by Justin Gomes RN  Outcome: Progressing  Flowsheets (Taken 8/17/2022 0920)  Verbalizes/displays adequate comfort level or baseline comfort level:   Encourage patient to monitor pain and request assistance   Assess pain using appropriate pain scale   Implement non-pharmacological measures as appropriate and evaluate response  Note: Patient denies pain at this time. Patient educated on pain rating scale. Problem: Cardiovascular - Adult  Goal: Maintains optimal cardiac output and hemodynamic stability  Outcome: Progressing  Flowsheets (Taken 8/17/2022 0920)  Maintains optimal cardiac output and hemodynamic stability: Monitor blood pressure and heart rate  Note: Patient on continuous telemetry. Denies chest pain. To have heart cath today.       Problem: Hematologic - Adult  Goal: Maintains hematologic stability  Outcome: Progressing  Flowsheets (Taken 8/17/2022 0920)  Maintains hematologic stability: Assess for signs and symptoms of bleeding or hemorrhage  Note: Patient to have heart cath today. Will monitor site when patient returns. Care plan reviewed with patient. Patient verbalize understanding of the plan of care and contribute to goal setting.

## 2022-08-18 NOTE — PROGRESS NOTES
Comprehensive Nutrition Assessment    Type and Reason for Visit:  Initial, Positive Nutrition Screen    Nutrition Recommendations/Plan:   Continue current diet  Consider daily MVI     Malnutrition Assessment:  Malnutrition Status: At risk for malnutrition (Comment) (Wasting may be d/t age; Significant weight loss; pt denies changes in po intake/appetite but states her appetite is typically poor; Eats small meals; Denies any significant weight loss; stated she has been trying to watch what she eats (sugar and salt)) (08/18/22 1144)    Context:  Acute Illness     Findings of the 6 clinical characteristics of malnutrition:  Energy Intake:  No significant decrease in energy intake  Weight Loss:   (12.8 % weight loss in 2 months (6/23/22 122 lbs 3 oz; 8/16/22 109 lbs 6 oz))     Body Fat Loss:  Mild body fat loss Orbital   Muscle Mass Loss:  Mild muscle mass loss Clavicles (pectoralis & deltoids)  Fluid Accumulation:  No significant fluid accumulation     Strength:  Not Performed    Nutrition Assessment:     Pt. nutritionally compromised AEB weight loss (?malnutrition- will continue to monitor). At risk for further nutrition compromise r/t s/p PCI 8/18 and underlying medical condition (Hx; DM, HTN, Hypothyroidism, Severe pulmonary hypertension). Nutrition Related Findings:    Pt. Report/Treatments/Miscellaneous: Pt seen- reported eating 100% of breakfast; Denied any changes in po intake/appetite; Stated that she typically has poor appetite; Denies any home ONS use; Reports eating small meals 2-3 times a day states she has never been a big eater (diet hx; cereal at breakfast, [place] for lunch/dinner); Denies any significant weight loss; Report no significant changes in the way her clothes fit (they fit loose like she likes them); Pt kept stating don't worry about her she is doing fine; Reported that she has been more mindful of her sugar and salt intake recently;  Will continue to monitor po intake/appetite and weight while inpatient. GI Status: No BM  Pertinent Labs: BUN 29, Cr 1.7, Glucose 156, A1C 7.0 (8/16/22), Hgb 11.3  Pertinent Meds: Synthroid     Wound Type: None       Current Nutrition Intake & Therapies:    Average Meal Intake: %  Average Supplements Intake: None Ordered  ADULT DIET; Regular    Anthropometric Measures:  Height: 5' 4\" (162.6 cm)  Ideal Body Weight (IBW): 120 lbs (55 kg)    Admission Body Weight: 109 lb 6.4 oz (49.6 kg) (8/16; no edema)  Current Body Weight: 109 lb 6.4 oz (49.6 kg) (8/16; no edema), 91.2 % IBW. Weight Source: Standing Scale  Current BMI (kg/m2): 18.8  Usual Body Weight:  (Per pt 120 lbs but it varies she used to weight 180 lbs years ago; Per EMR 5/25/22 118 lbs stated, 6/3/22 126 lbs 3 oz, 6/23/22 122 lbs 3 oz, 8/16/22 109 lbs 6 oz)     Weight Adjustment For: No Adjustment                 BMI Categories: Underweight (BMI less than 22) age over 72    Estimated Daily Nutrient Needs:  Energy Requirements Based On: Kcal/kg  Weight Used for Energy Requirements: Current (49.6 kg)  Energy (kcal/day): 9103-5503 (25-30 kcal/kg)  Weight Used for Protein Requirements: Current (49.6 kg)  Protein (g/day): ~60 grams (1.2 g/kg)         Nutrition Diagnosis:   Inadequate oral intake related to inadequate protein-energy intake as evidenced by weight loss    Nutrition Interventions:   Food and/or Nutrient Delivery: Continue Current Diet  Nutrition Education/Counseling: No recommendation at this time  Coordination of Nutrition Care: Continue to monitor while inpatient       Goals:  Previous Goal Met: Goal(s) Achieved  Goals: PO intake 75% or greater, by next RD assessment       Nutrition Monitoring and Evaluation:   Behavioral-Environmental Outcomes: None Identified  Food/Nutrient Intake Outcomes: Food and Nutrient Intake, Vitamin/Mineral Intake  Physical Signs/Symptoms Outcomes: Biochemical Data, GI Status, Fluid Status or Edema, Skin, Weight    Discharge Planning:     Too soon to determine     Roberto Carlosdarlyn Villalobos, [address]  Contact: [phone]

## 2022-08-18 NOTE — PROCEDURES
[address] [id number]                            CARDIAC CATHETERIZATION    PATIENT NAME: Jeremy Hanks                  :        1940  MED REC NO:   [id number]                           ROOM:       [id number]  ACCOUNT NO:   [de-identified]                           ADMIT DATE: 2022  PROVIDER:     Easton Pichardo MD    DATE OF PROCEDURE:  2022    INDICATION:  Severe symptomatic mitral regurgitation, not a candidate  for open heart surgery, pre-MitraClip. DESCRIPTION OF PROCEDURE:  After informed consent was obtained from the  patient, she was brought to the cardiac catheterization laboratory and  prepped in a sterile fashion. Radial artery and brachial vein were  chosen as the primary point of access. Preprocedure timeout was  completed. After infiltration of right brachial region with 2%  lidocaine using micropuncture and modified Seldinger technique under  fluoroscopic guidance and ultrasound guidance, I was able to insert a  5-Faroese sheath into the right brachial vein. After infiltration of  right wrist with 2% lidocaine using micropuncture and modified Seldinger  technique under fluoroscopic guidance and ultrasound guidance, I was  able to insert 6-Faroese sheath into the right radial artery. Standard  antispasmodic/antithrombotic cocktail was given intraarterially. We  then performed diagnostic coronary angiography using 5-Faroese JL3.5 and  5-Faroese JR4 catheters. Right heart catheterization was performed using  a 5-Faroese Vazquez balloon-tipped wedge catheter. RIGHT HEART CATHETERIZATION:  RA 14, RV 73/14, PA 70/26 with a mean of  40, wedge pressure with V-wave to 50 but overall it was around 30 mmHg. PA saturation was 58%. Cardiac index is 1.7. CORONARY ANGIOGRAM:  LEFT MAIN:  Distal left main has about 60% to 70% stenosis.     LAD:  LAD has multiple stenotic lesions totaling about 30% in the mid  section. There is a large diagonal branch with about 80% stenosis. Large left-to-right collaterals were seen from the diagonal branch into  the circumflex from the LAD of the right PDA. LEFT CIRCUMFLEX:   of proximal circumflex and retrograde fills from  the diagonal branch to a two OM system retrograde. RCA:  Proximal  with large marginal branch. RCA is dominant. INTERVENTION:  Given the findings on presentation, I elected to proceed  with intervention of the LAD. At this point, the patient was surgical  and we offered her CABG + MVR. She adamantly was against it. She had  been talking to her primary cardiologist multiple times as well. Her  ejection fraction had dropped to 30% to 35% and did not have left  bundle-branch block that would necessitate CRT. Therefore she elected  to proceed with percutaneous approach after heart team approach was  undertaken. We therefore proceeded with intervention of the LAD to save  the diagonal branch as well as the LAD as it was supplying the entire  myocardium. We extended all the way to the distal left main as well to  ensure adequate flow into the circumflex to the collaterals. I exchanged out for 6-[place] JL3.5 guiding catheter. Heparin IV was  given. ACT was confirmed to be above 250 seconds. I cannulated the  left main without complications. I wired the LAD with a Runthrough  wire. I wired the diagonal branch with a Runthrough wire as well. I  then stented the lesion with 2.5 x 38 Resolute [place] TASNEEM and then  proximally, a 4.0 x 22 Resolute [place] TASNEEM in an overlapping fashion. Both stents were deployed at 8 to 10 atmospheres. I then rewired the  diagonal branch with a Fielder wire. I postdilated the stent distally  with a 3.0 x 20 NC balloon up to 22 atmospheres distally and a 4.0 x 20  NC balloon proximally up to 20 atmospheres. I then used a 1.5 x 10  Euphora balloon and opened the stent from the LAD into the diagonal  branch.   I then used a 2.5 x 12 NC balloon and dilated to 20 atmospheres  pressures across with the diagonal branch ostium. I then used a 3.0 x  20 to the LAD for another 12 to 14 atmospheric inflations and then kissing  balloon inflation at 14 to 16 to the bifurcation. Clear that the  diagonal branch was at jeopardy and symptoms applied to the entire  myocardium that was supplied to the left circumflex via collaterals, I  elected to save this vessel. I used a 2.0 x 8 Resolute [place] TASNEEM and  stented the ostium of the diagonal branch in two-step fashion. I then  did a kissing balloon technique with a 2.5 NC balloon in the diagonal  branch and 3.0 NC balloon through the LAD. High pressure inflations at  15 atmospheres. Post PCI angiography demonstrated EVERTON-3 flow without  any perforation dissection, distal embolization, side branch loss, or  guide or guidewire-induced trauma. All equipments were removed from the  patient. The patient tolerated the procedure well. IMMEDIATE COMPLICATIONS:  None. MEDICATIONS:  See EMR. ACCESS:  Vasc Band was used for right radial artery and manual pressure  was used to right brachial vein. ESTIMATED BLOOD LOSS:  Less than 50 mL. SUMMARY:  Successful LAD diagonal bifurcation PCI; RCA ; left  circumflex ; severe symptomatic mitral regurgitation; RHC with reduced CI. PLAN:  1. Bedrest.  2.  Optimal medical therapy. 3.  Risk factor management. 4.  Routine access care. 5.  The patient's primary cardiac care is via Dr. De La Torre Courser. We will try to optimize medical treatment as tolerated and proceed with  MitraClip as soon as possible. 6.  DAPT. 7.  Restart home meds. 8.  Overnight admission. 9.  Monitor renal function. 10.  CHF clinic followup. 11.  LifeVest for the patient if wanted to proceed. All the above was explained to the patient and the patient's family. They were agreeable and amenable to the plan.         Peewee Aguilera MD    D: 08/18/2022 10:53:59 T:  08/18/2022 12:28:41     [email or web address]  Job#: [id number]     Doc#: [id number]    CC:

## 2022-08-18 NOTE — PROGRESS NOTES
Patient received pamphlet about cardiac intervention, how to take care of the incision site, mended hearts program, cardiac rehab and the hours of operations, and Nutritional information regarding cardiac diet. MI teaching done reviewing causes, signs, symptoms, and risk factors.

## 2022-08-18 NOTE — DISCHARGE SUMMARY
Cardiology Discharge Summary      Patient Identification:  Isabella Martinez  : 1940  MRN: [id number]   Account: [de-identified]     Admit date: 2022  Discharge date: 2022     Attending provider: Katiana Trevino MD        Primary care provider: Florentino Argueta MD   Primary Cardiologist: Dr. Easton Sam ([place])    Admission Diagnoses:  Renal insufficiency         Discharge Diagnoses:   Principal Problem:    Renal insufficiency  Active Problems:    Severe mitral regurgitation by prior echocardiogram    Severe pulmonary hypertension ([address]  Resolved Problems:    * No resolved hospital problems. [place] [facility] Course: Isabella Martinez is a 80 y.o. female admitted to [address] [place] [address] on 2022 for cardiac cath with anticipated PCI. Patient had undergone cardiac cath on 2022 and taken with Dr. Easton Sam. Significant multivessel disease was noted with 100% RCA. 100% left circumflex with large OM1.  60 to 70% in the mid LAD and 90% blockage in the first diagonal.  She is also known to have severe MR and is in process with work-up for MitraClip with Dr. Kesha Swift. She was taken to the Cath Lab on 2022 per Dr. Kesha Swift and a T stent was placed linking the LAD to the diagonal.  He was also noted to have severe pulmonary hypertension and severe mitral regurg. Patient remained stable throughout the procedure and postoperatively. She remained stable overnight. Right radial access site is stable. No bleeding or hematoma. Right upper extremity is neurovascularly intact. Labs were stable postoperatively. Vital signs remained stable. Patient denies pain or discomfort. Denies dyspnea at rest or with exertion. Tolerating being up in her room and ambulation in the shaw. No dizziness or lightheadedness, no syncope or near syncope. Patient voices that she is ready to go home.   She is tolerating her diet and voiding without difficulty. Routine post cath care instructions. Patient will follow up with Dr. Ingrid Giraldo in [facility] in the next 2 weeks. Structural heart will be in contact with her to move forward with her MitraClip. Procedures: 8/17/22: brief op note; full note to follow  [facility]  Sedation/Analgesia Post Sedation Record     Pt Name: Daron Morris  Account number: [de-identified]  MRN: [id number]  YOB: 1940  Procedure Performed By: MD MD Purnima Sanchez, [address]  Primary Care Physician: Rubens Norman MD  Date: 8/17/2022     POST-PROCEDURE     Physicians/Assistants: MD MD Purnima Sanchez, QI     Procedure Performed:Cath/ PCI                        Sedation/Anesthesia: Versed/ Fentanyl and 2% xylocaine local anesthesia. Estimated Blood Loss: < 50 ml. Specimens Removed: None                                           Disposition of Specimen: N/A                                         Complications: No Immediate Complications. Post-procedure Diagnosis/Findings:           Severe PH  LAD-DX with T-stenting  LAD - supplies RCA   DX  - supplies LCX  Severe                 MD MD Purnima Sanchez, QI  Electronically signed 8/17/2022 at 11:09 AM  Interventional Cardiology    Code Status: Full Code     Consults:   none    Examination:  Vitals:/68   Pulse 64   Temp 97.5 °F (36.4 °C) (Oral)   Resp 16   Ht 5' 4\" (1.626 m)   Wt 109 lb 6.4 oz (49.6 kg)   SpO2 99%   BMI 18.78 kg/m²      24 hour intake/output:  Intake/Output Summary (Last 24 hours) at 8/18/2022 1304  Last data filed at 8/18/2022 0331  Gross per 24 hour   Intake 220 ml   Output --   Net 220 ml       General Appearance: alert and oriented to person, place and time, in no acute distress resting in bed reading.    Cardiovascular: normal rate, regular rhythm, normal S1 and S2, (+) murmur, no rubs, clicks, or gallops, distal pulses intact, no JVD  Pulmonary/Chest: clear to auscultation bilaterally- no wheezes, rales or rhonchi, normal air movement, no respiratory distress, RA  Abdomen: soft, non-tender, non-distended, normal bowel sounds  Extremities: no cyanosis, clubbing or edema, pulses 2+. R radial artery site with mild ecchymosis and minimal swelling. Area soft; no hematoma, no bleeding. Ecchymosis R anticubital region and proximal inner R forearm; all areas soft; no underlying hematoma. RUE NVI, motor function intact.    Skin: warm and dry, no rash or erythema  Head: normocephalic and atraumatic  Eyes: pupils equal, round, and reactive to light  Neck: supple and non-tender without mass, no thyromegaly   Musculoskeletal: normal range of motion, no joint swelling, deformity or tenderness  Neurological: alert, oriented, normal speech, no focal findings or movement disorder noted    Significant Diagnostics:   8/17/22  EKG 12 lead  Order: [id number]  Status: Final result    Visible to patient: No (not released)    Next appt: 08/23/2022 at 10:30 AM in Internal Medicine Reese Villafana MD)    0 Result Notes    Component Ref Range & Units 8/17/22 1223 8/17/22 0556 5/26/22 0410 5/25/22 0941   Ventricular Rate BPM 61  66  72  100    Atrial Rate BPM 61  66  72  100    P-R Interval ms 160  162  148  154    QRS Duration ms 124  122  110  114    Q-T Interval ms 502  476  462  384    QTc Calculation (Bazett) ms 505  499  505  495    P Axis degrees 20  34  64  68    R Axis degrees -53  -48  -30  -36    T Axis degrees 49  55  -38  106    Resulting Agency  [address] [facility] M[facility] [address] [facility]             Narrative & Impression    Normal sinus rhythm  Left anterior fascicular block  Minimal voltage criteria for LVH, may be normal variant ( [place] product )  Anterolateral infarct , age undetermined  Abnormal ECG  When compared with ECG of 17-AUG-2022 05:56,  No significant change was found  Confirmed by Ward Aceves ([id number]) on 8/17/2022 2:23:22 PM           Radiology: No results found.     Labs:   Recent Results (from the past 72 hour(s))   CBC    Collection Time: 08/16/22  8:57 PM   Result Value Ref Range    WBC 5.2 4.8 - 10.8 thou/mm3    RBC 4.33 4.20 - 5.40 mill/mm3    Hemoglobin 11.5 (L) 12.0 - 16.0 gm/dl    Hematocrit 37.8 37.0 - 47.0 %    MCV 87.3 81.0 - 99.0 fL    MCH 26.6 26.0 - 33.0 pg    MCHC 30.4 (L) 32.2 - 35.5 gm/dl    RDW-CV 15.9 (H) 11.5 - 14.5 %    RDW-SD 50.3 (H) 35.0 - 45.0 fL    Platelets 449 885 - 484 thou/mm3    MPV 10.1 9.4 - 12.4 fL   Basic Metabolic Panel    Collection Time: 08/16/22  8:57 PM   Result Value Ref Range    Sodium 140 135 - 145 meq/L    Potassium 4.5 3.5 - 5.2 meq/L    Chloride 104 98 - 111 meq/L    CO2 23 23 - 33 meq/L    Glucose 117 (H) 70 - 108 mg/dL    BUN 27 (H) 7 - 22 mg/dL    Creatinine 1.6 (H) 0.4 - 1.2 mg/dL    Calcium 8.8 8.5 - 10.5 mg/dL   Hemoglobin A1C    Collection Time: 08/16/22  8:57 PM   Result Value Ref Range    Hemoglobin A1C 7.0 (H) 4.4 - 6.4 %    AVERAGE GLUCOSE 150 (H) 70 - 126 mg/dL   Lipid panel    Collection Time: 08/16/22  8:57 PM   Result Value Ref Range    Cholesterol, Total 81 (L) 100 - 199 mg/dL    Triglycerides 89 0 - 199 mg/dL    HDL 31 mg/dL    LDL Calculated 32 mg/dL   Protime-INR    Collection Time: 08/16/22  8:57 PM   Result Value Ref Range    INR 1.33 (H) 0.85 - 1.13   TYPE AND SCREEN    Collection Time: 08/16/22  8:57 PM   Result Value Ref Range    ABO A     Rh Factor NEG     Antibody Screen NEG    Anion Gap    Collection Time: 08/16/22  8:57 PM   Result Value Ref Range    Anion Gap 13.0 8.0 - 16.0 meq/L   Glomerular Filtration Rate, Estimated    Collection Time: 08/16/22  8:57 PM   Result Value Ref Range    Est, Glom Filt Rate 31 (A) ml/min/1.73m2   Electrocardiogram, 12-lead     Collection Time: 08/17/22  5:56 AM   Result Value Ref Range    Ventricular Rate 66 BPM    Atrial Rate 66 BPM    P-R Interval 162 ms    QRS Duration 122 ms    Q-T Interval 476 ms    QTc Calculation (Bazett) 499 ms    P Axis 34 degrees    R Axis -48 degrees    T Axis 55 degrees   Basic Metabolic Panel    Collection Time: 08/17/22  7:45 AM   Result Value Ref Range    Sodium 141 135 - 145 meq/L    Potassium 4.6 3.5 - 5.2 meq/L    Chloride 107 98 - 111 meq/L    CO2 22 (L) 23 - 33 meq/L    Glucose 106 70 - 108 mg/dL    BUN 25 (H) 7 - 22 mg/dL    Creatinine 1.4 (H) 0.4 - 1.2 mg/dL    Calcium 9.2 8.5 - 10.5 mg/dL   Anion Gap    Collection Time: 08/17/22  7:45 AM   Result Value Ref Range    Anion Gap 12.0 8.0 - 16.0 meq/L   Glomerular Filtration Rate, Estimated    Collection Time: 08/17/22  7:45 AM   Result Value Ref Range    Est, Glom Filt Rate 36 (A) ml/min/1.73m2   Venous O2 Saturation    Collection Time: 08/17/22 10:36 AM   Result Value Ref Range    POC O2 SAT 58 (L) 94 - 97 %    Source: PA     COLLECTED BY: [id number]    POCT activated clotting time    Collection Time: 08/17/22 10:55 AM   Result Value Ref Range    Activated Clotting Time 387 (H) 1 - 150 seconds   Venous O2 Saturation    Collection Time: 08/17/22 11:09 AM   Result Value Ref Range    POC O2 SAT 92 (L) 94 - 97 %    Source: ART     COLLECTED BY: [id number]    EKG 12 lead    Collection Time: 08/17/22 12:23 PM   Result Value Ref Range    Ventricular Rate 61 BPM    Atrial Rate 61 BPM    P-R Interval 160 ms    QRS Duration 124 ms    Q-T Interval 502 ms    QTc Calculation (Bazett) 505 ms    P Axis 20 degrees    R Axis -53 degrees    T Axis 49 degrees   CBC    Collection Time: 08/18/22  5:42 AM   Result Value Ref Range    WBC 6.3 4.8 - 10.8 thou/mm3    RBC 4.33 4.20 - 5.40 mill/mm3    Hemoglobin 11.3 (L) 12.0 - 16.0 gm/dl    Hematocrit 37.1 37.0 - 47.0 %    MCV 85.7 81.0 - 99.0 fL    MCH 26.1 26.0 - 33.0 pg    MCHC 30.5 (L) 32.2 - 35.5 gm/dl    RDW-CV 16.2 (H) 11.5 - 14.5 %    RDW-SD 49.5 (H) 35.0 - 45.0 fL    Platelets 589 620 - 089 thou/mm3    MPV 10.2 9.4 - 12.4 fL   Basic Metabolic Panel    Collection Time: 08/18/22  5:42 AM Result Value Ref Range    Sodium 138 135 - 145 meq/L    Potassium 5.2 3.5 - 5.2 meq/L    Chloride 107 98 - 111 meq/L    CO2 23 23 - 33 meq/L    Glucose 156 (H) 70 - 108 mg/dL    BUN 29 (H) 7 - 22 mg/dL    Creatinine 1.7 (H) 0.4 - 1.2 mg/dL    Calcium 9.0 8.5 - 10.5 mg/dL   Anion Gap    Collection Time: 08/18/22  5:42 AM   Result Value Ref Range    Anion Gap 8.0 8.0 - 16.0 meq/L   Glomerular Filtration Rate, Estimated    Collection Time: 08/18/22  5:42 AM   Result Value Ref Range    Est, Glom Filt Rate 29 (A) ml/min/1.73m2       Patient and Practitioner mutually agreed upon goal:   Patient and provider goals: Feel better and have more energy and continue to stay as active as she currently can with her known severe MR awaiting MitraClip. Continue compliance with medications and diet. Begin cardiac rehab as scheduled. Patient Instructions:    Discharge lab work: none  Post cath instructions including activity:   1. Take it easy for 3-4 days. 2.  No driving for 2 days. 3.  No lifting of 5 lbs or more for 5 days with the affected arm. 4.  Can shower after 24 hours. 5.  Remove arm board after 24 hours. 6.  Apply a band aid to the insertion site daily for 5 days. May apply antibiotic ointment if desired, but not necessary. Wash site daily with soap and water. 7.  No creams, ointments, or powders near the insertion site. 8.  No tub baths, swimming, hot tubs, or hand washing dishes for 1 week. 9.  Watch for signs of infection (redness, warmth, swelling, or pus drainage) or coolness of extremity and call physician if this occurs  10. If bleeding occurs from insertion site, apply pressure and call 911. Watch for numbness/tingling - if this occurs go to ER ASAP  Apply ice 15min with hour break in between and alternate with heat compress for 15 min to reduce swelling for 3-5 days     Diet: ADULT DIET;  Regular      Cardiac Rehab: Yes    Follow-up visits:   Alberto Hanson MD  [address]

## 2022-08-18 NOTE — PLAN OF CARE
Problem: Discharge Planning  Goal: Discharge to home or other facility with appropriate resources  8/17/2022 2159 by Beau Rooney RN  Outcome: Progressing  Flowsheets (Taken 8/17/2022 0920 by Buddy Arias RN)  Discharge to home or other facility with appropriate resources: Identify barriers to discharge with patient and caregiver  Note: Pt plans to be discharged home when appropriate. Problem: Safety - Adult  Goal: Free from fall injury  8/17/2022 2159 by Beau Rooney RN  Outcome: Progressing  Flowsheets (Taken 8/17/2022 0920 by Buddy Arias RN)  Free From Fall Injury: Instruct family/caregiver on patient safety  Note: Pt free from fall. Problem: Pain  Goal: Verbalizes/displays adequate comfort level or baseline comfort level  8/17/2022 2159 by Beau Rooney RN  Outcome: Progressing  Flowsheets (Taken 8/17/2022 0920 by Buddy Arias RN)  Verbalizes/displays adequate comfort level or baseline comfort level:   Encourage patient to monitor pain and request assistance   Assess pain using appropriate pain scale   Implement non-pharmacological measures as appropriate and evaluate response  Note: Pt complains of pain at a 0/10. Non pharmacological interventions completed which include rest. Pt states pain goal at a 0/10. Pain assessed every 4 hours, and as needed. PRN pain medications given as ordered. Problem: Cardiovascular - Adult  Goal: Maintains optimal cardiac output and hemodynamic stability  8/17/2022 2159 by Beau Rooney RN  Outcome: Progressing  Flowsheets (Taken 8/17/2022 0920 by Buddy Arias RN)  Maintains optimal cardiac output and hemodynamic stability: Monitor blood pressure and heart rate  Note: Vitals stable at this time. Cath sites remain dry and intact.      Problem: Hematologic - Adult  Goal: Maintains hematologic stability  8/17/2022 2159 by Beau Rooney RN  Outcome: Progressing  Flowsheets (Taken 8/17/2022 0920 by Buddy Arias RN)  Maintains hematologic stability: Assess for signs and symptoms of bleeding or hemorrhage  Note: No s/s of bleeding noted. Problem: Chronic Conditions and Co-morbidities  Goal: Patient's chronic conditions and co-morbidity symptoms are monitored and maintained or improved  Outcome: Progressing  Note: Updated on plan of care. Care plan reviewed with patient. Patient verbalize understanding of the plan of care and contribute to goal setting.

## 2022-08-18 NOTE — CARE COORDINATION
8/18/22, 3:51 PM EDT    Patient goals/plan/ treatment preferences discussed by  and . Patient goals/plan/ treatment preferences reviewed with patient/ family. Patient/ family verbalize understanding of discharge plan and are in agreement with goal/plan/treatment preferences. Understanding was demonstrated using the teach back method. AVS provided by RN at time of discharge, which includes all necessary medical information pertaining to the patients current course of illness, treatment, post-discharge goals of care, and treatment preferences. Services At/After Discharge: Group home and None       IMM Letter  IMM Letter given to Patient/Family/Significant other/Guardian/POA/by[de-identified] Aruna PALOMINO Case Manager  IMM Letter date given[de-identified] 08/17/22  IMM Letter time given[de-identified] 1330     Discharged home with no new services.

## 2022-08-21 NOTE — DISCHARGE INSTRUCTIONS
Discharge Instructions for Radial Heart Catherization    1. Take it easy for 3-4 days. 2.  No driving for 2 days. 3.  No lifting of 5 lbs or more for 5 days with the affected arm. 4.  Can shower after 24 hours. 5.  Remove arm board after 24 hours. 6.  Apply a band aid to the insertion site daily for 5 days. May apply antibiotic ointment if desired, but not necessary. Wash site daily with soap and water. 7.  No creams, ointments, or powders near the insertion site. 8.  No tub baths, swimming, hot tubs, or hand washing dishes for 1 week. 9.  Watch for signs of infection (redness, warmth, swelling, or pus drainage) or coolness of extremity and call physician if this occurs  10. If bleeding occurs from insertion site, apply pressure and call 911.      Watch for numbness/tingling - if this occurs go to ER ASAP  Apply ice 15min with hour break in between and alternate with heat compress for 15 min to reduce swelling for 3-5 days
10 feet

## 2022-08-24 NOTE — PATIENT INSTRUCTIONS
SURVEY:    You may be receiving a survey from [employer] regarding your visit today. Please complete the survey to enable us to provide the highest quality of care to you and your family. If you cannot score us a very good on any question, please call the office to discuss how we could have made your experience a very good one. Thank you.

## 2022-08-24 NOTE — PROGRESS NOTES
Vivek Karimi am scribing for and in the presence of Virginia Lara MD, MS, F.A.C.C..    Patient: Chivo Naranjo  : 1940  Date of Visit: 2022    REASON FOR VISIT / CONSULTATION: Follow-up (HX: Ischemic cardiomyopathy, severe mitral regurg, WEI, ASHD, HTN, HLD. Pt is here today for a follow up on one stent placed at [facility] a week ago on 22. Pt says she is doing okay, SOB if she over does it but is better, she has dizziness when she bends over. denies CP, palpitations, )      History of Present Illness:        Dear Christiano Lai MD    I had the pleasure of seeing Ms. Radha So is a 80 y.o. female for follow up today. She was admitted to the hospital with dizziness on 2022 which is when she first started seeing a cardiologist. This has been an on and off issue for her for the last 8 years or so. Her last episode was about a year ago. She denies ever being told herself having heart problems. Her mother had heart disease, CHF towards end of life, and a sister had valve problems and surgery. She had diabetes for 20 years. In her chart she has the diagnosis of both hypertension and hyperlipidemia. Echo 2022: Rory Vasques left ventricular systolic function appears mildly to moderately reduced with an estimated ejection fraction of 35-40%. The inferoseptal and inferior walls appear hypokinetic in their motion. The left ventricular cavity size is within normal limits and the left  ventricular wall thickness is moderately increased. The left atrium is severely dilated (>40) with a left atrial volume index of 45 ml/m2. Moderate myxomatous thickening of the mitral leaflets with mild mitral stenosis. Severe central and posteriorly directly mitral regurgitation mitral regurgitation. Severe pulmonary hypertension with an estimated right ventricular systolic pressure of 96 mmHg. Moderate to severe tricuspid regurgitation. Evidence of severe (grade III) diastolic dysfunction is seen.  Heart Catheterization done 6/9/2022: Severe three vessel disease involving the LAD, D1 branch of the LAD and  of the proximal circumflex and right coronary arteries. Normal LVEDP. Patient also with known Severe mitral regurgitation with an EF of 30-35%. She then went to see Dr. Alverto Linda for consultation for a mitral valve clip and also for FFR of the LAD. BANDAR done on 6/23/2022. Ms. Hanane Santo is here today for follow up after having one stent placed at [address] [place] [address]. Alejandra's on 8/17/22. She says she is feeling good. She she does have some shortness of breath but it is better. She does have some light headedness when she bends over. She denies chest pain, pressure, and tightness. She denies any palpitations. She also denied any abdominal pain, bleeding problems, problems with her medications or any other concerns at this time. Past Medical History:   Diagnosis Date    WEI (acute kidney injury) ([facility] [address] 05/26/2022    Aortic stenosis, severe 05/26/2022    Diabetes mellitus ([address] 2020    Dilated cardiomyopathy ([address] 05/26/2022    Hypertension     Hypothyroidism 1968    Mild mitral stenosis 05/26/2022    Mild-Severe tricuspid valve regurgitation 05/26/2022    Severe pulmonary hypertension ([address] 05/26/2022       CURRENT ALLERGIES: Patient has no known allergies. REVIEW OF SYSTEMS: 14 systems were reviewed. Pertinent positives and negatives as above, all else negative.      Past Surgical History:   Procedure Laterality Date    APPENDECTOMY  1964    CATARACT REMOVAL WITH IMPLANT  2008    CATARACT REMOVAL WITH IMPLANT  2006    CORONARY ANGIOPLASTY WITH STENT PLACEMENT  08/16/2022    PCI / TASNEEM to LAD-diagonal bifurcation    OVARY REMOVAL Right [employer] 5    TRANSESOPHAGEAL ECHOCARDIOGRAM N/A 06/23/2022    TRANSESOPHAGEAL ECHOCARDIOGRAM performed by Isael Andrade MD at [facility]    Social History:  Social History     Tobacco Use    Smoking status: Never     Passive exposure: Yes    Smokeless tobacco: Never   Vaping Use    Vaping Use: Never used   Substance Use Topics    Alcohol use: Not Currently    Drug use: Never        MEDICATIONS:  No current facility-administered medications for this visit. CURRENT INPATIENT MEDICATIONS:  Prior to Admission medications    Medication Sig Start Date End Date Taking? Authorizing Provider   nitroGLYCERIN (NITROSTAT) 0.4 MG SL tablet up to max of 3 total doses. If no relief after 1 dose, call 911. 8/18/22  Yes SHANNON Smith CNP   clopidogrel (PLAVIX) 75 MG tablet Take 1 tablet by mouth daily 8/19/22  Yes SHANNON Smith CNP   atorvastatin (LIPITOR) 10 MG tablet Take 1 tablet by mouth in the morning. 8/3/22  Yes Rick Guy MD   aspirin EC 81 MG EC tablet Take 1 tablet by mouth daily 6/20/22  Yes Manjinder Zamorano PA-C   losartan (COZAAR) 25 MG tablet Take 1 tablet by mouth daily 5/28/22  Yes SHANNON Weaver CNP   metoprolol succinate (TOPROL XL) 25 MG extended release tablet Take 1 tablet by mouth daily 5/28/22  Yes SHANNON Weaver CNP   levothyroxine (SYNTHROID) 100 MCG tablet Take 1 tablet by mouth Daily 5/27/22  Yes SHANNON Weaver CNP   meclizine (ANTIVERT) 25 MG CHEW Take 12.5 mg by mouth 3 times daily as needed (dizziness)   Yes Historical Provider, MD   acetaminophen (TYLENOL) 325 mg tablet Take 650 mg by mouth every 6 hours as needed for Pain   Yes Historical Provider, MD   Multiple Vitamins-Minerals (VISION VITAMINS PO) Take 1 tablet by mouth daily Vision Multi 50+   Yes Historical Provider, MD       FAMILY HISTORY: 2 sisters with CAD, 1 with stents and 1 with CABG and 1 with a valve repair in 60's     Physical Examination:     /69 (Site: Left Upper Arm, Position: Sitting, Cuff Size: Medium Adult)   Pulse 72   Resp 18   Ht 5' 4\" (1.626 m)   Wt 114 lb 3.2 oz (51.8 kg)   SpO2 99%   BMI 19.60 kg/m²  Body mass index is 19.6 kg/m². Constitutional: She appeared oriented to person and place.  She appears well-developed and well-nourished. In no acute distress. HEENT: Normocephalic and atraumatic. No JVD present. Carotid bruit is not present. No mass and no thyromegaly present. No lymphadenopathy noted. Cardiovascular: Normal rate, regular rhythm, normal heart sounds. Exam reveals no gallop and no friction rubs. 4/6 systolic murmur, 5th intercostal space on the LEFT in the mid-clavicular line (cardiac apex)  Pulmonary/Chest: Effort normal and breath sounds normal. No respiratory distress. She has no wheezes, rhonchi or rales. Abdominal: Soft, non-tender. She exhibits no organomegaly, mass or bruit. Extremities: None. No cyanosis or clubbing. 2+ radial and carotid pulses. Distal extremity pulses: 2+ bilaterally. Neurological: Alertness and orientation as per Constitutional exam. No evidence of gross cranial nerve deficit. Coordination appeared normal.   Skin: Skin is warm and dry. There is no rash or diaphoresis. Psychiatric: She has a normal mood and affect.  Her speech is normal and behavior is normal.      MOST RECENT LABS ON RECORD:   Lab Results   Component Value Date    WBC 6.3 08/18/2022    HGB 11.3 (L) 08/18/2022    HCT 37.1 08/18/2022     08/18/2022    CHOL 81 (L) 08/16/2022    TRIG 89 08/16/2022    HDL 31 08/16/2022    ALT 21 07/12/2022    AST 24 07/12/2022     08/18/2022    K 5.2 08/18/2022     08/18/2022    CREATININE 1.7 (H) 08/18/2022    BUN 29 (H) 08/18/2022    CO2 23 08/18/2022    TSH 0.59 05/25/2022    INR 1.33 (H) 08/16/2022    LABA1C 7.0 (H) 08/16/2022     ASSESSMENT:  Patient Active Problem List    Diagnosis Date Noted    Ischemic cardiomyopathy 06/09/2022    Severe mitral regurgitation by prior echocardiogram     Idiopathic cardiomyopathy ([facility] [address]     Renal insufficiency 08/16/2022    WEI (acute kidney injury) ([facility] [address] 05/26/2022    Severe pulmonary hypertension ([address] 05/26/2022    Mild-Severe tricuspid valve regurgitation 05/26/2022    Mild mitral stenosis 05/26/2022    Aortic stenosis, severe 05/26/2022    Near syncope 05/25/2022    Diabetes mellitus ([facility] 05/25/2022    Hypertension 05/25/2022    Hypothyroidism 05/25/2022    Probable prerenal azotemia - however no baseline creatinine level in Epic 05/25/2022    Dilated cardiomyopathy ([facility] - EF 35-40% 05/26/2022      PLAN:  Atherosclerotic Heart Disease: Heart Cath done on 6/9/2022 showed severe three vessel coronary artery disease involving a 100% proximal RCA occlusion with left to right filling of the mid and distal vessel, 100% proximal Cx occlusion with left to left filling of a large OM1 and a 60-70% LAD stenosis with a 90% D1 ostial stenosis. Normal left ventricular end diastolic pressure. The patient also has a 30-35% EF with severe mitral regurgitation and an estimated RVSP 2 weeks ago of 96 mmHG. She then went to see Dr. Jackie Markham for consultation for a mitral valve clip and also for FFR of the LAD. She is asymptotic at this time. We will have her continue to follow up with Dr. Jackie Markham until after procedure. She did have one stent placed at [facility]. Alejandra's on 8/17/22. Antiplatelet Agent: Continue Aspirin 81 mg daily. Beta Blocker: Continue Metoprolol succinate (Toprol XL) 25 mg daily. Cholesterol Reduction Therapy: Continue Atorvastatin (Lipitor) 10 mg daily. She does have severe blockages at this time as above and she was agreeable to restarting on the lower dose. Severe Mitral Regurgitation: S/p BANDAR done on 6/23/2022 with Dr. Jackie Markham. EF estimated at 30-35%, left atrial size was severely dilated with no thrombus identified. Mean gradient 4 mmHg--related to high flow. Continue follow up with Dr. Jackie Markham for likely planned clipping of the valve in the near future. Beta Blocker: Continue Metoprolol succinate (Toprol XL) 25 mg daily. ACE Inibitor/ARB: STOP losartan (Cozaar)    Ischemic Cardiomyopathy New York Heart Association Class: IIb   Beta Blocker: Continue Metoprolol succinate (Toprol XL) 25 mg daily.    ACE Inibitor/ARB: Because of his persistent New York Heart Association class II-III symptoms of shortness of breath with mild to moderate exertion in spite of arguably maximal tolerated medical management, combined with her reduced ejection fraction <40%, I would like to consider starting on a newer agent called Entresto (sacubitril/valsartan). This has been shown to improve heart failure symptoms as well as reduce hospitalization in patients with class II through class IV heart failure who also have an EF <40%. I discussed the risks and benefits of the drug including the risk of lightheadedness and dizziness as well as rare but serious potential angioedema and he was very interested in trying this medication. Therefore, I took the liberty of starting her on the starting 24mg/26mg and ultimately titrate up if tolerated. I told Ms. Holman to watch for any lightheadedness and dizziness and call if this develops. I also stressed that it was very important that any ACE or ARB be discontinued for at least 36 hours prior to starting Entresto. I also provided him with 2 weeks of samples of the 24mg/26mg 1/2 tab bid and instructed him it its use. I will also plan to see her back in 3-4 weeks to assess for response and side effects before deciding her ultimate maintenance dose. Additional counseling: I advised them to call our office or go to the emergency room if they developed worsening or persistent chest pain or increased shortness of breath as this could be life threatening. Heart failure counseling: I advised them to try and keep their legs up whenever possible and to limit salt in their diet. Essential Hypertension: Controlled  Beta Blocker: Continue Metoprolol succinate (Toprol XL) 25 mg daily. ACE Inibitor/ARB: STOP losartan (Cozaar) and START sacubitril/valsartan (Entresto) 24/26 mg, 1/2 tab twice daily.  I also discussed the potential side effects of this medication including lightheadedness and dizziness and instructed them to stop the medication of this occurs and call our office if this occurs. Hyperlipidemia: Mixed, LDL done on 8/16/22 was 31 mg/dL   Cholesterol Reduction Therapy: Continue Atorvastatin (Lipitor) 10 mg daily as above. Once again, thank you for allowing me to participate in this patients care. Please do not hesitate to contact me if I could be of any further assistance. FOLLOW UP:   I told Ms. Nara Jennings to call my office if she had any problems, but otherwise told her to Return in about 6 weeks (around 10/5/2022). However, I would be happy to see her sooner should the need arise. Sincerely,  Tiana Kaufman MD, MS, [facility] Cardiology Specialist     [facility], [address] [place] [address]  Phone: [phone], Fax: [phone]     I believe that the risk of significant morbidity and mortality related to the patient's current medical conditions are: Intermediate. The documentation recorded by the scribe, accurately and completely reflects the services I personally performed and the decisions made by me. Concepcion Gomez MD, MS, F.A.C.C.  August 24, 2022

## 2022-08-26 NOTE — PROGRESS NOTES
Phase II Cardiac Rehabilitation   Physician Order Antonieta Norris  1940  [id number]  8/26/2022    [x] Phase 2 ECG Monitored Cardiac Rehabilitation    [] MI     [x] PTCA with/without stents  [] CABG    [] Heart Valve Repair/Replaced   [] Stable Angina:      [x] CHF:  30-35%    Onset Date: 8/16/22    Cardiac Education Goals: (see individualized treatment plan for specific goals, progression & compliance)    Hypertension      Dietary sodium  Exercise      Home exercise     Cardiac anatomy and physiology   CAD/Interventions  Angina       Risk factors  Medications       Depression   Stress       Diabetes and HD  Heart healthy diet     Weight management       Prescribed Exercise Plan:    Submaximal exercise evaluation at program beginning and completion  Target Hr:     Initial MET Level: 2-3 METs     Duration: 31 - 60 Minutes  Frequency: 3 Days per week  Limitations: None    Modalities:  Treadmill   Bicycle ergometer/UBE  Seated Stepper  Rowing Machine  Weights/therabands    May increase duration per F.I.T.T. protocol parameters on average 5-10 minutes every 1-2 weeks for the first 4-6 weeks. After 3-4 weeks completed, continue to gradually increase F.I.T.T. parameters gradually at the established duration on average 0.5-1.0 METs per 30 days over the course of the remaining program, as established by patient centered goals and guidelines, maintaining RPE 12-16. Introduce 8-15 bilateral upper /lower extremity resistance exercise at 1-3 sets per lift, on 2-3 non-consecutive days using TheraBand/weights to 8-15 reps on at lease 2 occasions, maintaining RPE 12-16. Once repetition maximum has been achieved, additional weight sets may be added.       Standing Orders:  Initiate ACLS for cardiac events  Nitroglycerine 0.4mg SL every 5 minutes X 3 for angina pain  12 lead EKG for chest pain or dysrhythmia  O2 per nasal cannula as needed for SpO2 <90% with symptoms  Blood sugar monitoring for hyper/hypoglycemia symptoms  Lipid panel pre/post program as needed.

## 2022-08-26 NOTE — PROGRESS NOTES
Cardiac Rehab Initial History and Assessment    Krissy Guerrero   1940  [id number]  8/26/2022    Primary Diagnosis: PCI w/ TASNEEM  Date: 8/16/22    Living Will: [x] Yes   [] No  On File: [x] Yes   [] No   [] N/A  Durable Power of : [x] Yes   [] No    Medical History  Past Medical History:   Diagnosis Date    WEI (acute kidney injury) ([facility] 05/26/2022    Aortic stenosis, severe 05/26/2022    Diabetes mellitus ([facility] 2020    Dilated cardiomyopathy ([facility] 05/26/2022    Hypertension     Hypothyroidism 1968    Mild mitral stenosis 05/26/2022    Mild-Severe tricuspid valve regurgitation 05/26/2022    Severe pulmonary hypertension ([facility] 05/26/2022       Family History  Family History   Problem Relation Age of Onset    Hypertension Mother     Diabetes Mother     Heart Disease Mother     Lung Cancer Father     Heart Disease Sister     Colon Cancer Sister        Symptoms:  1. Angina   [x] None   [] Tightness   [x] Shortness of Breath   [] Pressure    [] Nausea   [] Sharp, Stabbing  [] Pallor   [] Indigestion, Heartburn [] Sweaty    Where was discomfort located? Precipitating Factors: exertion  Relieved by: rest    2. Arrhythmia   [x] None   [] Irregular Beats (skips) [] Pacer    [] Atrial Fibrillation  [] AICD    Arrhythmia Medications:    3. Congestive Heart Failure   [x] None   [] Pedal Edema  [] Unusual weight gain   [] SOB with mild exertion [] Fatigue    4. Vascular   [x] None   [] Peripheral claudication [] R [] L  PAD History:  Claudication History:                Location:  Pain Scale (1-5):  [] Carotid Narrowing  [] R [] L    5. Musculoskeletal    [x] None   [] Back Pain  Where? [] Joint discomfort Where?      Nutrition  Appetite:  []  Too Good    []  Good  [x]  Poor  Diet: Diabetic  Restaurant food 1-2 times/wk  Dietary Screening:  Rate Your Plate: 60   Daily Food Diary completed:       [x] Yes        [] No  Lipids:    Date 8/16/22        Total Cholesterol:   Lab Results   Component Value Date    CHOL 81 (L) 2022    CHOL 84 2022    CHOL 129 2022     Lab Results   Component Value Date    TRIG 89 2022    TRIG 83 2022    TRIG 101 2022     Lab Results   Component Value Date    HDL 31 2022    HDL 26 (L) 2022    HDL 32 (L) 2022     Lab Results   Component Value Date    LDLCHOLESTEROL 41 2022    LDLCHOLESTEROL 77 2022    LDLCALC 32 2022     No results found for: LABVLDL, VLDL  Lab Results   Component Value Date    CHOLHDLRATIO 3.2 2022    CHOLHDLRATIO 4.0 2022    TC     Lipid Meds: Lipitor    Alcohol:   Social History     Substance and Sexual Activity   Alcohol Use Not Currently     Caffeine: [] Yes [x] No  Type:  Amount:  Water intake per day: 1.5 Liters    Psychological  [] Depression    [] Anxiety    [x] None  Treatment:     Tobacco Use  Social History     Tobacco Use   Smoking Status Never    Passive exposure: Yes   Smokeless Tobacco Never     Current smokers:  Longest quit attempt:  Tobacco triggers:  Barriers to successful cessation:  [] Smoking cessation medication:    Diabetes     [x] Yes  [] No  How Lon years  How do you manage your diabetes:  diet  Do you check your blood sugar? [] Yes  [x] No  How often:  Have you seen a dietician or diabetic educator?        [] Yes  [x] No      HbA1c  : 7.0  FBS: 106               Diabetic Medication: NA    Socio-Economic  Marital Status:     Medication Compliance (stated):    [x] 100%  [] 75%           [] 50%  [] 25%      [] 0%         Stress  Source: Money  Relaxation techniques/hobbies: Reading, walking the dog    Level of Education  [] 8th Grade  [x] Associates  [] Masters  [] High School [] Bachelor  []  Other:    Depression Screening:  [x] PHQ9 completed - score: 3    Psychosocial Screenin/27//  [x] [employer] and Foss Quality of Life Index scores:      Cardiac Rehab Pre - Test  [x] Score: 93%    Physical Findings  Height: 5'4\"  Weight:113.4  BMI: 19.6  Weight goal: BP Sittin/68  BP Standing:  Current Exercise -   [x] Yes - type/frequency/intensity/duration  [] No    Cardiovascular-  No edema, apical pulse regular to auscultation, strong bilateral upper and lower extremity pulses   12-Lead EKG(22) Normal sinus rhythm  Left anterior fascicular block  VR- 61 bpm  NH- 160ms  QRS- 124ms  QT/QTc- 502/505 ms  Cardiac Cath-22: LAD: mid 60%, proximal 70%, 1st diag 90%; Prox Lcx: 90%; prox %  Ejection Fraction- 30-35%  Pulmonary-   Breath Sounds:    SpO2: 96%  [] 02 use:   [] CPAP  [] BiPAP   [] Other    Neurological- No deficit noted or reported. Peripheral Vascular- No deficit noted or reported. Muscular Skeletal- No deficit noted or reported. Pain Assessment- Location/Frequency/ Duration-   Pain Level 7/10 on 10 point scale  Endocrine- No deficit noted or reported. Gastrointestinal- No deficit noted or reported. Genitourinary- No deficit noted or reported.   Other-    Barriers to Program Adherence:  [] Transportation   [] Travel distance  [] Insurance/copay    [] Motivation  [] Other  [] NA    Risk Stratification (of untoward event during exercise):  [x] HIGH RISK  LVEF < 40%  Survivor of cardiac arrest or sudden cardiac death  Complex ventricular arrythmias (VT >6 beats, multifocal PVCs at rest or with exercise)  Presence of angina or other significant symptoms (shortness of breath, light-headedness, or dizziness at <5 METs or during recovery  MI or cardiac surgery complicated by cardiogenic shock, CHF, and/or s/s of post-procedure ischemia  Abnormal hemodynamics with exercise, especially flat or decreasing systolic BP or chronotropic incompetence with ^ workload  Significant silent ischemia (ST depression >/= 2mm without symptoms with exercise or in recovery  Signs/symptoms including angina, dizziness, light-headedness or dyspnea with low exercise levels or in recovery  Clinically significant depression   Physically Inactive - <= 5 METs  Implantable cardioverter defibrillator (ICD)  At least 4 of below:   Lipids LDL >130 / Cholesterol > 200  BP >160/100  BMI >30  Smoker who continues to smoke  DM - HgbA1C >=7  [] MODERATE RISK  LVEF 40-49%  Mild to moderate level of silent ischemia during exercise testing or recovery (ST depression <2mm from baseline)  Presence of stable angina or other significant symptoms (unusual shortness of breath, light-headedness, or dizziness occurring only at high level of exertion [>=7 METs])  Functional capacity 5.1-8.9 METs  At least 2-3 of below:  Lipids -129 / Cholesterol 148-980  BP - systolic 860-323/MHMIZLEWJ 13-35  BMI >00  DM - HgbA1C 5.8-7.0  [] LOW RISK  Rest LVEF >= 50%  No resting or exercise induced complex dysrhythmias  Uncomplicate MI, CABG, angioplasty, atherectomy, or stent  Normal hemodynamic and EGC response with exercise and in recovery (appropriate increases and decreases in HR and SBP with increasing workloads and recovery  Functional capacity >9 METs  Absence of angina or other significant symptoms (unusual shortness of breath, light-headedness, or dizziness during exercise and recovery)  Absence of complicated ventricular arrhythmias at rest  Absence of HF  Absence of signs/symptoms of post-event or post-procedure ischemia  Absence of clinical depression   Non-smoker   0-1 Uncontrolled risk factors    Fall Risk Assessment:  History of falls with or without injury  [x] Yes   [] No   Use of ambulatory aid  [] Yes  [x] No   Difficulty walking/impaired gait  [] Yes  [x] No   Numbness in feet  [] Yes   [x] No   Vision changes  [] Yes  [x] No   Dizziness  [x] Yes  [] No   Shortness of breath  [x] Yes  [] No   Medications  [x] Anticoagulant/Antiplatelet  [x] Betablocker /ACE/ARB  [] Antidepressant  [] Seizure medication   [] NA  Risk of fall  [] Low (none of above)  [] Medium (less than 3 above)  [x] High (3 or more above)       Patient 90-Day Goals: (Specific, Measurable, Achievable, Relevant, and Time-Bound)  Be able to walk a mile without getting short of breath    Program Goals:   Achieve and progress prescribed exercise frequency, intensity, time, and type based upon initial evaluation and submaximal graded exercise results as evidenced by the attaining and maintaining the prescribed target heart rate range, a Navin rating of perceived exertion between 11 and 16, duration of >30 - 60 minutes using multiple exercise modes for at least 3 days/week, progressing at least 0.5-1.0 METs/week as tolerated, as evidenced by daily session reports and pre/post MET levels. Introduce and progress 8-10 different bilateral UE and/or LE progressive resistance exercises focused on major muscle groups using 1-3 sets each per lift, on 2-3 non-consecutive days implementing free and machine weights and/or TheraBands, as appropriate, with a resistance of 40-60% 1-repetition maximum or 10 -15 repetitions to progressive overload and increasing resistance once repetitions have progressed to 15 reps and feel fairly light on 2 prior occasions. Develop regular home aerobic exercise program for 20 - 60 minutes at least 2 non-rehab days per week, excluding  5 - 10 minutes warm-up and cool-down periods, as tracked on home workout log. Establish and maintain individualized heart healthy eating plan, and gradually lose 10-15 lb of body weight over the program, utilizing dietician recommendations, moderating nutrional intake, and performing regular aerobic and strength training exercises as prescribed, as evidenced by routine weights, pre- and post-program nutriton survey and routine rounding with patient, food diary, and Rate-your-Plate screening survey.     Strive for blood lipid optimization with an LDL-C of <100 mg/dL or  LDL 70 mg/dL, an HDL-C of > or = 40 mg/dL for men and > or = 50 mg/dL for women, and a triglyceride level of <150 mg/dL via lifestyle education, behavioral modification, and medication compliance, as evidenced by improved post-program lipid results. Strive for HbA1C <= 7.0% via lifestyle education, behavioral modification, and medication compliance as evidenced by post program HbA1C lab results. Achieve and maintain an optimal average resting blood pressure of <120 / 80 mmHg (130/80 for DM) over the course of the program by educating patient, monitoring medication compliance, introducing and maintaining regular cardiovascular exercise program, as evidenced by routine BP monitoring. Minimize self-reported psycho-social feelings of stress over the program by educating patient, monitoring medication compliance, introducing and maintaining regular cardiovascular exercise as evidenced by pre- and post- surveys and by routine rounding with patient. Demonstrate knowledge about risk factor reduction, lifestyle modification, and heart health strategies with an increase of >=5% accuracy via pre- and post-program education assessment screening tool. Complete abstinence from tobacco products over the cardiac rehab program through intensive counseling, behavior modification, and medication compliance as evidenced by routine rounding with patient.     Electronically signed by Estefany Hare CEP on 8/26/22 at 11:56 AM BIA

## 2022-08-26 NOTE — PROGRESS NOTES
Phase II Cardiac Rehab Individualized Treatment Plan-Initial     Patient Name: Magdiel Elliott  Date of Initial Assessment: 8/26/2022  ACCOUNT #: [de-identified]  Diagnosis: PCI w/ TASNEEM   Onset Date: 8/16/22  Referring Physician: Dr. Brittany Umanzor  Session Number: 1   Risk Stratification (of untoward event during exercise):  [x] HIGH RISK  LVEF < 40%  Survivor of cardiac arrest or sudden cardiac death  Complex ventricular arrythmias (VT >6 beats, multifocal PVCs at rest or with exercise)  Presence of angina or other significant symptoms (shortness of breath, light-headedness, or dizziness at <5 METs or during recovery  MI or cardiac surgery complicated by cardiogenic shock, CHF, and/or s/s of post-procedure ischemia  Abnormal hemodynamics with exercise, especially flat or decreasing systolic BP or chronotropic incompetence with ^ workload  Significant silent ischemia (ST depression >/= 2mm without symptoms with exercise or in recovery  Signs/symptoms including angina, dizziness, light-headedness or dyspnea with low exercise levels or in recovery  Clinically significant depression   Physically Inactive - <= 5 METs  Implantable cardioverter defibrillator (ICD)  At least 4 of below:   Lipids LDL >130 / Cholesterol > 200  BP >160/100  BMI >30  Smoker who continues to smoke  DM - HgbA1C >=7  [] MODERATE RISK  LVEF 40-49%  Mild to moderate level of silent ischemia during exercise testing or recovery (ST depression <2mm from baseline)  Presence of stable angina or other significant symptoms (unusual shortness of breath, light-headedness, or dizziness occurring only at high level of exertion [>=7 METs])  Functional capacity 5.1-8.9 METs  At least 2-3 of below:  Lipids -129 / Cholesterol 587-403  BP - systolic 002-197/VDBDPUWLZ 50-33  BMI >39  DM - HgbA1C 5.8-7.0  [] LOW RISK  Rest LVEF >= 50%  No resting or exercise induced complex dysrhythmias  Uncomplicate MI, CABG, angioplasty, atherectomy, or stent  Normal hemodynamic and EGC response with exercise and in recovery (appropriate increases and decreases in HR and SBP with increasing workloads and recovery  Functional capacity >9 METs  Absence of angina or other significant symptoms (unusual shortness of breath, light-headedness, or dizziness during exercise and recovery)  Absence of complicated ventricular arrhythmias at rest  Absence of HF  Absence of signs/symptoms of post-event or post-procedure ischemia  Absence of clinical depression   Non-smoker   0-1 Uncontrolled risk factors    EXERCISE    Stages of Change:   [] pre-contemplation  [] Action   [] Contemplate    [] Maintainence   [x] Prep   [] Relapse          Exercise Prescription:  Mode: [x] TM    [x] B    [x] STP   [] R   [x] UBE    Frequency: 3 days per week  Duration: 31-60 minutes  Intensity: 2-3 METs             THRR:    Progression:   Based on risk stratification, may increase duration per F.I.T.T. protocol parameters on average 5-10 minutes every 1-2 weeks for the first 4-6 weeks. After 3-4 weeks completed, continue to gradually increase F.I.T.T. parameters gradually at the established duration on average 0.5-1.0 METs per 30 days over the course of the remaining program, as established by patient centered goals and guidelines, maintaining RPE 12-16. [] Resistance Training  Introduce 8-15 bilateral upper extremity resistance exercise at 1-3 sets per lift, on 2-3 non-consecutive days using TheraBand/Free Weights/Weight machine to 8-15 reps on at lease 2 occasions, maintaining RPE 12-16. Once repetition maximum has been achieved, additional weight sets may be added. [] Angina with Exertion    Hypertension:  [x] Yes  [] No  Resting BP: 114/68  BP Meds: Toprol XL    Intervention:  Home Exercise:  Current Exercise -    [x] Yes - walks dog daily for 30 min   [] No   [] Resistance Training  Progression:  20-60 minutes of aerobic exercise on at lease 2 non-rehab days.   8-12 bilateral upper extremity resistance exercise at 1-3 sets per lift, on 2 non-consecutive days using TheraBand/Free Weights/Weight Machine to 8-15 reps on at lease 2 occasions. Once repetition maximum has been achieved, additional weight sets may be added. Education:   [x] Equipment [employer]  [] Understanding BP   [x] S/S to report  [] Sodium     [x] Warm up/ Cool down  [] Exercise Prescription   [x] RPE Scale   [] Hot/Cold weather guidelines   [x] Ex Safety   [] Home Exercise     [] Proper use weights/bands    Target Goal:   -Individual Exercise Plan  -BP<120/80 or <130/80 if DM   -Aerobic active 30 + minutes 5-7 days per week    Nutrition    Stages of Change:   [] pre-contemplation  [] Action   [] Contemplate    [] Maintainence   [x] Prep    [] Relapse    Hyperlipidemia:  [x] Yes  [] No  Lipids: 8/16/22  Lipids:    Total Cholesterol:   Lab Results   Component Value Date    CHOL 81 (L) 08/16/2022    CHOL 84 07/12/2022    CHOL 129 05/26/2022     Lab Results   Component Value Date    TRIG 89 08/16/2022    TRIG 83 07/12/2022    TRIG 101 05/26/2022     Lab Results   Component Value Date    HDL 31 08/16/2022    HDL 26 (L) 07/12/2022    HDL 32 (L) 05/26/2022     Lab Results   Component Value Date    LDLCHOLESTEROL 41 07/12/2022    LDLCHOLESTEROL 77 05/26/2022    LDLCALC 32 08/16/2022     No results found for: LABVLDL, VLDL  Lab Results   Component Value Date    CHOLHDLRATIO 3.2 07/12/2022    CHOLHDLRATIO 4.0 05/26/2022        Lipid Meds: Lipitor     Diabetes:  [x] Yes  [] No  FBS: 106  HbA1c: 7.0  [] Monitor BS at home   Frequency?: Does not check  Diabetes Medication:    Weight Management:  Height: 5'4\"  Weight: 113.4  BMI: 19.6     Wt Goal: BMI >20  Alcohol:   Social History     Substance and Sexual Activity   Alcohol Use Not Currently     Diet Assessment Tool:   [x]  Food Diary  Rate My Plate Score:   60  Special Diet:  2 gram NA+, 30% total fat, <10% saturated fat, 25-35 grams fiber, reduced cholesterol, balanced nutrition    Intervention:   [] Dietitian Consult       [] Staff/Patient Discussion     [] Referred to Diabetes Education     Education:  [] Heart Healthy Nutrition  [] Weight Management  [] Portions  [] Fat/Cholesterol  [] Food Labels       [] Food Packaging Claims  [] Snacks and Substitutes      [] Relate Diabetes/CAD    Target Goal:  -LDL-C<100 if triglycerides are > 200  -LDL-C < 70 for high risk patients  -HbA1c < 7%  -BMI < 25     Education    Stages of Change:    [] pre-contemplation  [] Action   [] Contemplate    [] Maintainence   [x] Prep    [] Relapse    Learning Barriers:   [] Speech   [] Cognitive   [] Literacy   [] Vision   [x] Hearing    [] Readiness to Learn    Knowledge test score: 93%      Family support: [x] Yes  [] No    Tobacco use:   Social History     Tobacco Use   Smoking Status Never    Passive exposure: Yes   Smokeless Tobacco Never     Current smokers:  Longest quit attempt:  Tobacco triggers:  Barriers to successful cessation:  [] Smoking cessation medication:    Intervention:  [] Referred to physician for smoking cessation    [] Individual education and counseling  [] Tobacco adjunct      Education:   [] Cardiac A and P  [] Interventions  [] Risk Factors     [] Angina      [] Medications  [] CHF     Target Goal:  -Complete cessation of tobacco use (if applicable)  -Continued risk factor modifications  -Recognizing signs/symptoms to report  -Proper use of meds    Psychosocial  Stages of Change:    [] pre-contemplation  [] Action   [] Contemplate    [] Maintainence   [x] Prep    [] Relapse    Psychosocial Test:  Tool Used:   Howard Foss Quality of Life Score:  29/27/30/30/27  PHQ9 score: 3    Intervention:   [] Psych Consult/   [] Uses stress management skills    [] Physician Referral     Medications:     Education:    [] Stress Management   [] Depression    Target Goal:  -Assess presence or absence of depression using a valid screening tool.   -Maximize coping skills.  -Positive support system. Preventative Medication:   [x] Aspirin       [x] Beta Blockade      [x] Statin or other lipid lowering agent     [x] Antiplatelet   [] ACE Inhibitor/ARB   [] Anticoagulant   Medication Compliance (stated):    [x] 100%  [] 75%           [] 50%  [] 25%      [] 0%         Target Goal:  -100% medication adherence    Fall Risk Assessment:  History of falls with or without injury  [x] Yes   [] No   Use of ambulatory aid  [] Yes  [x] No   Difficulty walking/impaired gait  [] Yes  [x] No   Numbness in feet  [] Yes   [x] No   Vision changes  [] Yes  [x] No   Dizziness  [x] Yes  [] No   Shortness of breath  [x] Yes  [] No   Medications  [x] Anticoagulant/Antiplatelet  [x] Betablocker /ACE/ARB  [] Antidepressant  [] Seizure medication   [] NA  Risk of fall  [] Low (none of above)  [] Medium (less than 3 above)  [x] High (3 or more above)    Patient 90-Day Goals: (Specific, Measurable, Achievable, Relevant, and Time-Bound)  Be able to walk a mile without getting short of breath      Program Goals:   Achieve and progress prescribed exercise frequency, intensity, time, and type based upon initial evaluation and submaximal graded exercise results as evidenced by the attaining and maintaining the prescribed target heart rate range, a Navin rating of perceived exertion between 11 and 16, duration of >30 - 60 minutes using multiple exercise modes for at least 3 days/week, progressing at least 0.5-1.0 METs/week as tolerated, as evidenced by daily session reports and pre/post MET levels.      Introduce and progress 8-10 different bilateral UE and/or LE progressive resistance exercises focused on major muscle groups using 1-3 sets each per lift, on 2-3 non-consecutive days implementing free and machine weights and/or TheraBands, as appropriate, with a resistance of 40-60% 1-repetition maximum or 10 -15 repetitions to progressive overload and increasing resistance once repetitions have progressed to 15 reps and feel Jame's light on 2 prior occasions. Develop regular home aerobic exercise program for 20 - 60 minutes at least 2 non-rehab days per week, excluding  5 - 10 minutes warm-up and cool-down periods, as tracked on home workout log. Establish and maintain individualized heart healthy eating plan, and gradually lose 10-15 lb of body weight over the program, utilizing dietician recommendations, moderating nutrional intake, and performing regular aerobic and strength training exercises as prescribed, as evidenced by pre- and post-program nutriton survey and routine rounding with patient, food diary, and Rate-your-Plate screening survey. Strive for blood lipid optimization with an LDL-C of <100 mg/dL or  LDL 70 mg/dL, an HDL-C of > or = 40 mg/dL for men and > or = 50 mg/dL for women, and a triglyceride level of <150 mg/dL via lifestyle education, behavioral modification, and medication compliance, as evidenced by improved post-program lipid results. Strive for HbA1C <= 7.0% via lifestyle education, behavioral modification, and medication compliance as evidenced by post program HbA1C lab results. Achieve and maintain an optimal average resting blood pressure of <120 / 80 mmHg (130/80 for DM) over the course of the program by educating patient, monitoring medication compliance, introducing and maintaining regular cardiovascular exercise program as evidenced by routine BP monitoring. Minimize self-reported psycho-social feelings of stress over the program by educating patient, monitoring medication compliance, introducing and maintaining regular cardiovascular exercise as evidenced by pre- and post- surveys and by routine rounding with patient. Demonstrate knowledge about risk factor reduction, lifestyle modification, and heart health strategies with an increase of >=5% accuracy via pre- and post-program education assessment screening tool.     Complete abstinence from tobacco products over the cardiac rehab program through intensive counseling, behavior modification, and medication compliance as evidenced by routine rounding with patient.     Electronically signed by Norma Martinez CEP on 8/26/22 at 12:35 PM EDT    Physician Changes/Comments:

## 2022-08-30 NOTE — CARE COORDINATION
I was able to fax in her completed application into [employer] for her [facility]. Once I hear back from the company I will let the patient know.           [facility]   Medication Assistance  [address] [place] [address], and [email or web address]    H) [phone] (L) [phone]

## 2022-08-30 NOTE — TELEPHONE ENCOUNTER
Looking at previous encounter on 8/9, pt did not wish to try d/t hearing bad things on TV about Entresto. If this has changed I will call it in.

## 2022-09-06 NOTE — TELEPHONE ENCOUNTER
Orders received from Dr. Shana Garcia to schedule this patient for her MitraClip procedure and have the patient hold all medications the morning of the procedure. MitraClip: 9/14/2022 at 0730 with an arrival of 0500  Discharge home with her cousin    Post MitraClip instructions per Dr. Shana Garcia: have the patient be seen in the Dr. Gretel Hartmann office 7 days post MitraClip, obtain a CBC, BMP and ECHO prior to the 30 day post MitraClip follow up appointment.     7 day post MitraClip appointment:9/20/2022 at 1300 (with Dr. Bard Matthews)  [address] [facility] follow up appointment 9/27/2022 at 1430  CBC/BMP/ECHO:10/12/2022 at 1400 in [facility]  30 day post MitraClip appointment:10/13/2022 at 1215

## 2022-09-06 NOTE — TELEPHONE ENCOUNTER
Pre-MitraClip Assessment      Straight STS Score = 8.732%    Total Estimated Operative Morbidity and Mortality Risk = 32.7%     Dental Clearance = obtained    Layla Mclaughlin)  The following questions refer to your heart failure and how it may affect your life. Please read and complete the following questions. There is no right or wrong answers. Please paul the answer that best applies to you. Heart failure affects different people in different ways. Some feel shortness of breath while others feel fatigue. Please indicate how much you are limited by heart failure (shortness of breath or fatigue) in your ability to do the following activities over the past 2 weeks. Activity Extremely Limited Quite a bit limited Moderatly Limited Slightly Limited Not at all Limited Limited for other reasons/ did not do the activity   Showering/ Bathing         x     Walking 1 block on Level ground    x     Hurrying or jogging (as if to catch a bus)    x          1         2       3  4       5   6     2. Over the past 2 weeks, how many times did you have swelling in your feet, ankles or legs when you woke up in the morning? Every morning 3 or more times per week, but not every day 1-2 times per week Less than once a week Never over the past 2 weeks         x   `        1          2   3       4                     5            3. Over the past 2 weeks, on average, how many times has fatigue limited your ability to do what you wanted? All of the time Several times per day At least once a day 3 or more times per week 1-2 times per week Less than once a week Never over the past 2 weeks          x             1        2     3  4        5       6  7        4. Over the past 2 weeks, on average, how many times has shortness of breath limited your ability to do what you    wanted?      All of the time Several times per day At least once a day 3 or more times per week 1-2 times per week Less than once a week Never over the past 2 weeks          x             1        2     3  4         5       6  7     5. Over the past 2 weeks, on average, how many times have you been forced to sleep sitting up in a chair or with at least 3 pillows to prop you up because of shortness of breath? Every night 3 or more times per week, but not every day 1-2 times per week Less than once a week Never over the past 2 weeks        x    `        1          2   3       4                     5        6.  Over the past 2 weeks, how much has your heart failure limited your enjoyment of life? It has extremely limited my enjoyment of life It has limited my enjoyment of life quite a bit It has moderately limited my enjoyment of life It has slightly limited my enjoyment of life It has not limited my enjoyment of life         x         1          2   3       4           5     7.  If you had to spend the rest of your life with your heart failure the way it is right now, how would you feel about this? Not at all satisfied Mostly dissatisfied Somewhat satisfied Mostly satisfied Completely satisfied       x                1                         2                            3                        4                            5        8. How much does your heart failure affect your lifestyle?  Please indicate how your heart failure may have limited your participation in the following activities over the past 2 weeks    Activity Severely limited Limited quite a bit Moderately limited Slightly limited Did not limit at all Does not apply or did not do this activity   Hobbies, recreational activities    x     Working or doing household chores    x     Visiting family or friends out of your home    x                 1      2   3         4       5            6

## 2022-09-10 NOTE — PROCEDURES
[address] [place], [place] [address]                               PULMONARY FUNCTION    PATIENT NAME: Luigi Fox                  :        1940  MED REC NO:   [id number]                              ROOM:  ACCOUNT NO:   [de-identified]                           ADMIT DATE: 08/10/2022  PROVIDER:     Chuyita Iraheta    DATE OF PROCEDURE:  08/10/2022    FINDINGS:  Spirometry does not meet the criteria for obstruction and has  an FEV1 of 1.49 liters and FVC of 1.97 liters. The postbronchodilator study shows an improvement in FEV1 to 1.69 liters  and the FVC is 2.12 liters. Total lung capacity is normal at 93% predicted. Diffusion capacity is decreased at 74% predicted. Airway resistance is decreased at 69% predicted. Specific conductance is mildly increased at 126% predicted. Flow-volume loop shows raised possibility of obstruction. 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         Ayaka Dewitt    D: 2022 14:53:56       T: 2022 23:54:12     [id number]  Job#: [id number]     Doc#: [id number]    CC:    Miguel Jiménez MD

## 2022-09-14 PROBLEM — I31.4 CARDIAC TAMPONADE: Status: ACTIVE | Noted: 2022-01-01

## 2022-09-14 PROBLEM — Q23.9 MITRAL LEAFLET ABNORMALITY: Status: ACTIVE | Noted: 2022-01-01

## 2022-09-14 NOTE — H&P
[address] [address]  Sedation/Analgesia History & Physical    Pt Name: Loni Castaneda  Account number: [de-identified]  MRN: [id number]  YOB: 1940  Provider Performing Procedure: Daren Jacobs MD MD  Referring Provider: Daren Jacobs MD   Primary Care Physician: Alberto Hanson MD  Date: 9/14/2022    PRE-PROCEDURE    Code Status: FULL CODE  Brief History/Pre-Procedure Diagnosis:   Progressive LV failure after clip    Consent: : I have discussed with the patient risks, benefits, and alternatives (and relevant risks, benefits, and side effects related to alternatives or not receiving care), and likelihood of the success. The patient and/or representative appear to understand and agree to proceed. The discussion encompasses risks, benefits, and side effects related to the alternatives and the risks related to not receiving the proposed care, treatment, and services. The indication, risks and benefits of the procedure and possible therapeutic consequences and alternatives were discussed with the patient. The patient was given the opportunity to ask questions and to have them answered to his/her satisfaction. Risks of the procedure include but are not limited to the following: Bleeding, hematoma including retroperitoneal hemmorhage, infection, pain and discomfort, injury to the aorta and other blood vessels, rhythm disturbance, low blood pressure, myocardial infarction, stroke, kidney damage/failure, myocardial perforation, allergic reactions to sedatives/contrast material, loss of pulse/vascular compromise requiring surgery, aneurysm/pseudoaneurysm formation, possible loss of a limb/hand/leg, needing blood transfusion, requiring emergent open heart surgery or emergent coronary intervention, the need for intubation/respiratory support, the requirement for defibrillation/cardioversion, and death. Alternatives to and omission of the suggested procedure were discussed.  The patient had no further questions and wished to proceed; the consent form was signed. MEDICAL HISTORY   has a past medical history of WEI (acute kidney injury) ([facility] [address] Aortic stenosis, severe, Diabetes mellitus ([address] Dilated cardiomyopathy ([address] Hypertension, Hypothyroidism, Mild mitral stenosis, Mild-Severe tricuspid valve regurgitation, and Severe pulmonary hypertension ([address] SURGICAL HISTORY   has a past surgical history that includes Appendectomy (1964); Tonsillectomy (1959); Ovary removal (Right, 1964); Cataract removal with implant (2008); transesophageal echocardiogram (N/A, 06/23/2022); Coronary angioplasty with stent (08/16/2022); and Cataract removal with implant (2006).   Additional information:       ALLERGIES   Allergies as of 09/14/2022    (No Known Allergies)     Additional information:       MEDICATIONS     Current Facility-Administered Medications:     0.9 % sodium chloride infusion, , IntraVENous, Continuous, Nathaniel Shaw MD, Stopped at 09/14/22 1044    sodium chloride flush 0.9 % injection 5-40 mL, 5-40 mL, IntraVENous, 2 times per day, Diantha Plants, PA-C    sodium chloride flush 0.9 % injection 5-40 mL, 5-40 mL, IntraVENous, PRN, Diantha Plants, PA-C    0.9 % sodium chloride infusion, , IntraVENous, PRN, Diantha Plants, PA-C    EPINEPHrine 5 mg in in dextrose 5% 250 ml infusion, 1-20 mcg/min, IntraVENous, Continuous, Nathaniel Shaw MD, Last Rate: 15 mL/hr at 09/14/22 1312, 5 mcg/min at 09/14/22 1312    midazolam (VERSED) 2 MG/2ML injection, , , ,     0.9 % sodium chloride infusion, , IntraVENous, PRN, Soraida Bowers MD    calcium gluconate 10 % injection, , , ,     norepinephrine (LEVOPHED) 16 mg in sodium chloride 0.9 % 250 mL infusion, 1-100 mcg/min, IntraVENous, Continuous, Nathaniel Shaw MD, Last Rate: 18.8 mL/hr at 09/14/22 1312, 20 mcg/min at 09/14/22 1312    midazolam (VERSED) infusion 100mg/100mL, 1-10 mg/hr, IntraVENous, Continuous, Soraida Bowers MD    midazolam (VERSED) injection 2 mg, 2 mg, IntraVENous, Once, Keturah Moritz, MD    midazolam (VERSED) injection 2 mg, 2 mg, IntraVENous, Once, Keturah Moritz, MD    [facility]) 20 mg in dextrose 5 % 100 mL infusion, 0.0625-0.75 mcg/kg/min, IntraVENous, Continuous, Donna Hollis MD  Prior to Admission medications    Medication Sig Start Date End Date Taking? Authorizing Provider   sacubitril-valsartan (ENTRESTO) 24-26 MG per tablet Take 1 tablet by mouth 2 times daily  Patient taking differently: Take 0.5 tablets by mouth 2 times daily 9/12/22   SHANNON Lang CNP   clopidogrel (PLAVIX) 75 MG tablet Take 1 tablet by mouth daily 8/24/22   Linda Aguilar MD   metoprolol succinate (TOPROL XL) 25 MG extended release tablet Take 1 tablet by mouth daily 8/24/22   Linda Aguilar MD   nitroGLYCERIN (NITROSTAT) 0.4 MG SL tablet up to max of 3 total doses. If no relief after 1 dose, call 911. 8/18/22   SHANNON Smith - CNP   atorvastatin (LIPITOR) 10 MG tablet Take 1 tablet by mouth in the morning.  8/3/22   Linda Aguilar MD   aspirin EC 81 MG EC tablet Take 1 tablet by mouth daily 6/20/22   Mele Miranda PA-C   levothyroxine (SYNTHROID) 100 MCG tablet Take 1 tablet by mouth Daily 5/27/22   Evie Santos APRN - CNP   meclizine (ANTIVERT) 25 MG CHEW Take 12.5 mg by mouth 3 times daily as needed (dizziness)    Historical Provider, MD   acetaminophen (TYLENOL) 325 mg tablet Take 650 mg by mouth every 6 hours as needed for Pain    Historical Provider, MD   Multiple Vitamins-Minerals (VISION VITAMINS PO) Take 1 tablet by mouth daily Vision Multi 50+    Historical Provider, MD     Additional information:       VITAL SIGNS   Vitals:    09/14/22 1230   BP:    Pulse: 93   Resp: 17   Temp:    SpO2:        PHYSICAL:   General: No acute distress  HEENT:  Unremarkable for age  Neck: without increased JVD, carotid pulses 2+ bilaterally without bruits  Heart: RRR, S1 & S2 WNL, S4 gallop, without murmurs or rubs   NYHA: 2  Lungs: Clear to auscultation    Abdomen: BS present, without HSM, masses, or tenderness    Extremities: without C,C,E.  Pulses 2+ bilaterally  Mental Status: Alert & Oriented        PLANNED PROCEDURE   []Cath  []PCI                []Pacemaker/AICD  []BANDAR             []Cardioversion []Peripheral angiography/PTA  [x]Other: IABP     SEDATION  Planned agent:[x]Midazolam []Meperidine [x]Sublimaze []Morphine  []Diazepam  []Other:     ASA Classification:  []1 []2 []3 [x]4 []5  Class 1: A normal healthy patient  Class 2: Pt with mild to moderate systemic disease  Class 3: Severe systemic disease or disturbance  Class 4: Severe systemic disorders that are already life threatening. Class 5: Moribund pt with little chances of survival, for more than 24 hours. Mallampati I Airway Classification:   []1 []2 []3 []4 - intubated    [x]Pre-procedure diagnostic studies complete and results available. Comment:    [x]Previous sedation/anesthesia experiences assessed. Comment:    [x]The patient is an appropriate candidate to undergo the planned procedure sedation and anesthesia. (Refer to nursing sedation/analgesia documentation record)  [x]Formulation and discussion of sedation/procedure plan, risks, and expectations with patient and/or responsible adult completed. [x]Patient examined immediately prior to the procedure.  (Refer to nursing sedation/analgesia documentation record)    Luisito Butler MD MD   Electronically signed 9/14/2022 at 2:19 PM

## 2022-09-14 NOTE — BRIEF OP NOTE
[address] [address]  Sedation/Analgesia Post Sedation Record    Pt Name: Loni Castaneda  Account number: [de-identified]  MRN: [id number]  YOB: 1940  Procedure Performed By: Julien Barlow, MD MD Bonny Lesch, [address]  Primary Care Physician: Alberto Hanson MD  Date: 9/14/2022    POST-PROCEDURE    Physicians/Assistants: Julien Barlow, MD MD Bonny Lesch, [place]    Procedure Performed:MitraClip      Sedation/Anesthesia: Versed/ Fentanyl and 2% xylocaine local anesthesia.       Estimated Blood Loss: 1700 cc via pericardium    Specimens Removed: None         Disposition of Specimen: N/A        Complications: Tamponade       Post-procedure Diagnosis/Findings:       MitraClip XTW x 1  Attempted placement of second clip, however, clip was against interatrial septum and she developed acute cardiac tamponade  Immediate pericardiocentesis s/p 1700 cc removed  Anticoag reversed  LVEF dropped from 30-35% --> 10% due to treatment of MR  V wave was 80 dropped to 20         Julien Barlow, MD MD Bonny Lesch, QI  Electronically signed 9/14/2022 at 2:14 PM  Interventional Cardiology

## 2022-09-14 NOTE — PROCEDURES
Central Line Placement:   Medical necessity  Patient was placed in the attention position. Patient was placed in Trendelenburg position. Patient was prepped using Chlorhexidene prep. Patient was draped utilizing sterile gloves, hair net, mask, sterile gown and sterile OR towels. Maximum barrier precautions were utilized. Utilizing the left subclavian approach, patient received 5 cc of 1% lidocaine. Utilizing an introducer needle, it was placed subcutaneously advanced under the clavicle and leveled flat. It was subsequently advanced toward the thoracic inlet, until venous return was obtained. A guide wire was placed through the introducer needle. The introducer needle was subsequently withdrawn leaving the guide wire in place. Utilizing a scalpel, a small incision was made in the skin. A punch dilator was placed over the guide wire and subsequently withdrawn leaving the guide wire in place. A triple lumen catheter was subsequently placed over the guide wire. The guide wire was subsequently withdrawn leaving this catheter in place. All ports had good venous return and were subsequently flushed with saline. The catheter was secured using 2.0 silk. Secondary cleansing with chlorhexidine prep was subsequently placed. Tegaderm with bio- patch dressing was utilized for secondary securing and protection. There were no complications. EBL:   Less than 5 mL      Indication:  Cardiac tamponade    Electronically signed by Marifer Jane.  Tete Merrill MD

## 2022-09-14 NOTE — CARE COORDINATION
9/14/22, 6:53 AM EDT  DISCHARGE PLANNING EVALUATION:    Ross Lynch       Admitted: 9/14/2022/ [address] day: 0   [facility]: [facility] Reason for admit: No admission diagnoses are documented for this encounter. PMH:  has a past medical history of WEI (acute kidney injury) ([address] Aortic stenosis, severe, Diabetes mellitus ([address] Dilated cardiomyopathy ([address] Hypertension, Hypothyroidism, Mild mitral stenosis, Mild-Severe tricuspid valve regurgitation, and Severe pulmonary hypertension ([address] Procedure:   9/14 MitraClip procedure. Barriers to Discharge:  Here for elective procedure, MitraClip by Dr. Han Ann. PCP: Carolin Colón MD   %  Patient's Healthcare Decision Maker: Named in [address] [place] [address]    Patient Goals/Plan/Treatment Preferences: Spoke with pt's brother and sister-in-law as pt was at procedure. Pt lives in a [address] [place] [address] [place] with a cousin. Pt is independent and drives. Denies any DME, but does have grab bars installed in bathroom. No need for HH. Verified PCP and insurance. Transportation/Food Security/Housekeeping Addressed:  No issues identified.

## 2022-09-14 NOTE — CARE COORDINATION
09/14/22 1041   Readmission Assessment   Number of Days since last admission? 8-30 days   Previous Disposition Home with Family   Who is being Charlann Spearing   What was the patient's/caregiver's perception as to why they think they needed to return back to the hospital? Other (Comment)  (Elective procedure: MitraClip)   Did you visit your Primary Care Physician after you left the hospital, before you returned this time? Yes   Did you see a specialist, such as Cardiac, Pulmonary, Orthopedic Physician, etc. after you left the hospital? Yes   Who advised the patient to return to the hospital? Physician   Does the patient report anything that got in the way of taking their medications? No   In our efforts to provide the best possible care to you and others like you, can you think of anything that we could have done to help you after you left the hospital the first time, so that you might not have needed to return so soon?  Other (Comment)  (Nothing, here for elective procedure.)

## 2022-09-14 NOTE — FLOWSHEET NOTE
1108- patient arrived to [facility] via cath lab. BP in the 40s, HR 120s. Sophie Luke CNP called at this time. Levophed at 10 mcg/min at this time. 1110- Dr Shireen Brantley at bedside with Sophie Luke CNP. 1115- Epi started at 10 mcg/min    1115- Levo increased to 20 mcg/min    1119- 2 mg of versed given. 1125- 1 gm of calcium given    1127- temp sensing robledo obtained    1130- Dr Shireen Brantley exchanged the evacuated container. 500 ml obtained right away    1140- Dr Julieta Cleary at bedside    1154 Massive Transfusion ordered. 1158- 2 mg of versed given    1202- versed gtt started at 5 mg/h RASS goal -2,-3.    1204- 500 ml obtained. Atrium placed at -40    1228- 40 ml in atrium bright red    1248- patient MAP 30. Sophie Luke CNP at bedside placed evacuated container back to patient    1300- 200 ml removed from evac container    1315 100 ml removed from evac container    1320- Dr Shireen Brantley at bedside to adjust pericardial drain. Reconnected to atrium changed to -10    1346- 80 ml in atrrium bright red. [address] giving 1 amp bicarb, 1 g calcium chloride. Cath lab RN's at bedside to take patient to cath lab to place IABP with Dr. Julieta Cleary. 1539 Patient returned to ICU bed 15 from cath lab. IABP place in right groin. Dr. Julieta Cleary ordered to titrate off a-line blood pressures. 1640 Massive transfusion stop time. Total blood products given since original procedure:  PRBC-8 units  FFP- 4 units  Platelets-1 units. 1730 In last hour patient has had three episodes of rapid decrease in arterial line and augmented blood pressures. Blood pressures shortly after increase to SBP above 200. Notified Dr. Shireen Brantley and Sophie ORTEGA. Each at bedside to assist. Attempted to drain pericardial drain with Vacutainer, minimal output. Reconnected pericardial drain to atrium at -10 suction. Order of 2 more units of PRBC. 1830 CVP 1. Notified Johny ORTEGA. She spoke with Dr. Julieta Cleary on phone.  Patient status as well and lab work relayed to each. Order to not give last PRBC.     1900 Order from [facility] to start Nitro gtt if Augmented pressure above 150.

## 2022-09-14 NOTE — H&P
CRITICAL CARE NOTE      Patient:  Mariaelena Rey    Unit/Bed:4D-15/015-A  YOB: 1940  MRN: [id number]   PCP: Yaron Collins MD  Date of Admission: 9/14/2022  Chief Complaint:- Cardiac Tamponade    Assessment and Plan:    Cardiac Tamponade: Patient undergoing MitraClip procedure 9/14, massive bleed and developed cardiac tamponade. Pericardial drain was placed, will flush daily. Patient noted to have left ventricular dysfunction with low EF. Planned transition to intra-aortic balloon pump to augment support. Acute Hypoxic Respiratory Failure: Patient's respiratory status quickly declined post MitraClip procedure secondary to hypovolemic shock, was intubated during procedure and remains so. CXR demonstrated proper placement of all tubes and mitral valve clip with mild cardiomegaly. Intubated, maintain lung protective strategies. Maintain O2 sat > 94%. Hypovolemic Shock: Hemorrhagic in nature due to massive bleed during Mitraclip procedure developing into cardiac tamponade. At this time, patient became hypotensive, respiratory status declined, and required massive transfusion. LA 3.3, will trend. Epi and Levo started, will wean as tolerated. Severe Mitral Regurgitation,  Pulmonary HTN,  Multivessel Disease: Patient had undergone cardiac cath on 6/9/2022. Significant multivessel disease was noted with 100% RCA. 100% left circumflex with large OM1.  60 to 70% in the mid LAD and 90% blockage in the first diagonal. She was taken to the Cath Lab on 8/17/2022 per Dr. Jolanta Samaniego and a T stent was placed linking the LAD to the diagonal, also noted to have severe symptomatic mitral regurgitation and severe pulmonary HTN. Successful LAD diagonal bifurcation PCI, RCA , and Left Circumflex  done at that time. MitraClip done today (9/14) with Dr. Jolanta Samaniego. Cardiology is primary and following. HTN, currently Hypotensive: Will hold home antihypertensive medications.  Continue pressor support to maintain MAP > 65. Will wean as tolerated. CKD Stage 3b: Per result review. Patient eGFR has been in range for stage 3b CKD. Cr currenlty 1.5 which fits with patient new baseline. Will monitor. Non-Insulin Dependent DM2: A1c from 22 7.0, controlled for her age. Patient on no medication per chart review. Will monitor with accu-checks. Idiopathic Cardiomyopathy and HFrEF: Transesophageal ECHO on 22 demonstrated severe global hypokinesis. EF 30-35%, Left atrium size severely dilated with no thrombus. Hypothyroidism: TSH normal in . On home synthroid. INITIAL H AND P AND ICU COURSE:  Patient is 80year old female with PMHx of Severe Mitral Regurgitation, Pulmonary HTN, Multivessel Disease, DM2, HTN, Idiopathic Cardiomyopathy, HFrEF, Hypothyroidism. Patient presented to [facility] on  for MitraClip procedure due to severe mitral regurgitation noted on previous diagnostic cath in 2022. During procedure, massive hemorrhagic bleed noted that led to Cardiac Tamponade. At that time, the patient became hypotensive (SBP in 40's), Tachycardic ('s), and respiratory status decreased. Patient presented to ICU at this time, patient was in need of massive transfusion. Levo and Epi started pericardial drain was placed and atrium placed at -40, later adjusted to -10 with better results. Patient noted to have left ventricular dysfunction with low EF. Patient to return back to Cath lab for planned transition to intra-aortic balloon pump to augment support. Past Medical History:  As per HPI. Family History: Mother , HTN, DM, Heart Disease. Father , Lung Cancer. Social History:  Lifelong non-smoker, no alcohol use, no illicit drug use. ROS   Unable to attain at this time.      Scheduled Meds:   sodium chloride flush  5-40 mL IntraVENous 2 times per day    midazolam        calcium gluconate        midazolam  2 mg IntraVENous Once    midazolam  2 mg IntraVENous Once     Continuous Infusions:   sodium chloride Stopped (09/14/22 1044)    sodium chloride      EPINEPHrine 5 mcg/min (09/14/22 1312)    sodium chloride      norepinephrine 20 mcg/min (09/14/22 1312)    midazolam         PHYSICAL EXAMINATION:  T:  97.7 (36.5). P:  104. RR:  19. B/P:  104/68. FiO2:  70. O2 Sat:  100. I/O:  pending  Body mass index is 18.88 kg/m². GCS:   3  PC: 14/6: TV: 405: RRTotal: 17: Ti:1 sec:  General:   Elderly female resting comfortably in bed  HEENT:  normocephalic and atraumatic. No scleral icterus. PERR  Neck: supple. No Thyromegaly. Lungs: clear to auscultation. No retractions  Cardiac: RRR. No JVD. Abdomen: soft. Nontender. Extremities:  No clubbing, cyanosis, or edema x 4. Vasculature: capillary refill < 3 seconds. Palpable dorsalis pedis pulses. Skin:  warm and dry. Psych:  Alert and oriented x3. Affect appropriate  Lymph:  No supraclavicular adenopathy. Neurologic:  No focal deficit. No seizures. Data: (All radiographs, tracings, PFTs, and imaging are personally viewed and interpreted unless otherwise noted). Sodium 139 Potassium 4.7 Cl 103 CO2 21 BUN 21 Cr 1.5 AG 15.0 Ionized Ca 0.85 eGFR 33 LA 3.5 Glucose 99 ProBNP 61551  WBC 11.5 Hgb 14.6 Hct 42.9 MCV 88.8  ABG pH 7.32 PCO2 32 pO2 302 HCO3 17  EKG demonstrated sinus tachycardia with left axis deviation  Telemetry shows      Seen with multidisciplinary ICU team.  Meets Continued ICU Level Care Criteria:    [x] Yes   [] No - Transfer Planned to listed location:  [] HOSPITALIST CONTACTED-      Case and plan discussed with Dr. Rolando Padron. Electronically signed by Carlo Cavazos DO  [address]   Patient seen by me including key components of medical care. Case discussed with resident physician. See significant event. Italicized font, if present,  represents changes to the note made by me. CC time 35 minutes. Time was discontiguous. Time does not include procedure.  Time does include my direct assessment of the patient and coordination of care. Time represents more than 50% of the time involved with patient care by the [address] [place] [address] team.  Electronically signed by Shama Hall.  Rain Armando MD.

## 2022-09-14 NOTE — ANESTHESIA PRE PROCEDURE
Department of Anesthesiology  Preprocedure Note       Name:  Carlo Coy   Age:  80 y.o.  :  1940                                          MRN:  [id number]         Date:  2022      Surgeon: * No surgeons listed *    Procedure: * No procedures listed *    Medications prior to admission:   Prior to Admission medications    Medication Sig Start Date End Date Taking? Authorizing Provider   sacubitril-valsartan (ENTRESTO) 24-26 MG per tablet Take 1 tablet by mouth 2 times daily  Patient taking differently: Take 0.5 tablets by mouth 2 times daily 22   [facility], APRN - CNP   clopidogrel (PLAVIX) 75 MG tablet Take 1 tablet by mouth daily 22   Linda Aguilar MD   metoprolol succinate (TOPROL XL) 25 MG extended release tablet Take 1 tablet by mouth daily 22   Linda Aguilar MD   nitroGLYCERIN (NITROSTAT) 0.4 MG SL tablet up to max of 3 total doses. If no relief after 1 dose, call 911. 22   Isha Pagan, APRN - CNP   atorvastatin (LIPITOR) 10 MG tablet Take 1 tablet by mouth in the morning.  8/3/22   Linda Aguilar MD   aspirin EC 81 MG EC tablet Take 1 tablet by mouth daily 22   Mele Miranda PA-C   levothyroxine (SYNTHROID) 100 MCG tablet Take 1 tablet by mouth Daily 22   Evie Santos, APRN - CNP   meclizine (ANTIVERT) 25 MG CHEW Take 12.5 mg by mouth 3 times daily as needed (dizziness)    Historical Provider, MD   acetaminophen (TYLENOL) 325 mg tablet Take 650 mg by mouth every 6 hours as needed for Pain    Historical Provider, MD   Multiple Vitamins-Minerals (VISION VITAMINS PO) Take 1 tablet by mouth daily Vision Multi 50+    Historical Provider, MD       Current medications:    Current Facility-Administered Medications   Medication Dose Route Frequency Provider Last Rate Last Admin    0.9 % sodium chloride infusion   IntraVENous Continuous Donna Hollis MD 75 mL/hr at 22 0604 New Bag at 22 0604    sodium chloride flush 0.9 % injection 5-40 mL  5-40 mL IntraVENous 2 times per day Michael uFentes, PA-C        sodium chloride flush 0.9 % injection 5-40 mL  5-40 mL IntraVENous PRN Ricka Gina, PA-C        0.9 % sodium chloride infusion   IntraVENous PRN Rickcyndi Fuentes, PA-C           Allergies:  No Known Allergies    Problem List:    Patient Active Problem List   Diagnosis Code    Near syncope R55    Diabetes mellitus ([facility] [address] E11.9    Hypertension I10    Hypothyroidism E03.9    Probable prerenal azotemia - however no baseline creatinine level in Epic R79.89    WEI (acute kidney injury) ([facility] [address] N17.9    Severe pulmonary hypertension (HCC) I27.20    Mild-Severe tricuspid valve regurgitation I07.1    Mild mitral stenosis I05.0    Aortic stenosis, severe I35.0    Dilated cardiomyopathy (HCC) - EF 35-40% I42.0    Severe mitral regurgitation by prior echocardiogram I34.0    Idiopathic cardiomyopathy (HCC) I42.8    Ischemic cardiomyopathy I25.5    Renal insufficiency N28.9       Past Medical History:        Diagnosis Date    WEI (acute kidney injury) ([facility] [address] 05/26/2022    Aortic stenosis, severe 05/26/2022    Diabetes mellitus ([address] 2020    Dilated cardiomyopathy ([facility] [address] 05/26/2022    Hypertension     Hypothyroidism 1968    Mild mitral stenosis 05/26/2022    Mild-Severe tricuspid valve regurgitation 05/26/2022    Severe pulmonary hypertension ([address] 05/26/2022       Past Surgical History:        Procedure Laterality Date    APPENDECTOMY  1964    CATARACT REMOVAL WITH IMPLANT  2008    CATARACT REMOVAL WITH IMPLANT  2006    CORONARY ANGIOPLASTY WITH STENT PLACEMENT  08/16/2022    PCI / TASNEEM to LAD-diagonal bifurcation    OVARY REMOVAL Right 1964    TONSILLECTOMY  1959    TRANSESOPHAGEAL ECHOCARDIOGRAM N/A 06/23/2022    TRANSESOPHAGEAL ECHOCARDIOGRAM performed by Katiana Trevino MD at [facility]       Social History:    Social History     Tobacco Use    Smoking status: Never     Passive exposure:  Yes    Smokeless tobacco: Never   Substance Use Topics    Alcohol use: Not Currently                                Counseling given: Not Answered      Vital Signs (Current):   Vitals:    09/14/22 0534   BP: 122/68   Pulse: 75   Resp: 18   Temp: 98.1 °F (36.7 °C)   TempSrc: Oral   SpO2: 96%   Weight: 110 lb (49.9 kg)   Height: 5' 4\" (1.626 m)                                              BP Readings from Last 3 Encounters:   09/14/22 122/68   08/24/22 110/69   08/23/22 96/62       NPO Status:                                                                                 BMI:   Wt Readings from Last 3 Encounters:   09/14/22 110 lb (49.9 kg)   08/24/22 114 lb 3.2 oz (51.8 kg)   08/23/22 116 lb 3.2 oz (52.7 kg)     Body mass index is 18.88 kg/m². CBC:   Lab Results   Component Value Date/Time    WBC 5.2 09/14/2022 05:31 AM    RBC 4.92 09/14/2022 05:31 AM    HGB 13.0 09/14/2022 05:31 AM    HCT 42.5 09/14/2022 05:31 AM    MCV 86.4 09/14/2022 05:31 AM    RDW 14.7 07/12/2022 09:39 AM     09/14/2022 05:31 AM       CMP:   Lab Results   Component Value Date/Time     09/14/2022 05:31 AM    K 4.7 09/14/2022 05:31 AM     09/14/2022 05:31 AM    CO2 21 09/14/2022 05:31 AM    BUN 21 09/14/2022 05:31 AM    CREATININE 1.5 09/14/2022 05:31 AM    GFRAA 27 07/12/2022 09:39 AM    LABGLOM 33 09/14/2022 05:31 AM    GLUCOSE 99 09/14/2022 05:31 AM    PROT 7.2 09/14/2022 05:31 AM    CALCIUM 9.4 09/14/2022 05:31 AM    BILITOT 0.5 09/14/2022 05:31 AM    ALKPHOS 81 09/14/2022 05:31 AM    AST 19 09/14/2022 05:31 AM    ALT 10 09/14/2022 05:31 AM       POC Tests: No results for input(s): POCGLU, POCNA, POCK, POCCL, POCBUN, POCHEMO, POCHCT in the last 72 hours.     Coags:   Lab Results   Component Value Date/Time    INR 1.09 09/14/2022 05:31 AM    APTT 32.8 09/14/2022 05:31 AM       HCG (If Applicable): No results found for: PREGTESTUR, PREGSERUM, HCG, HCGQUANT     ABGs: No results found for: PHART, PO2ART, OQC5LLT, LCT8MOM, BEART, R5PJMOOT     Type & Screen (If Applicable):  Lab Results   Component Value Date    LABRH NEG 08/16/2022       Drug/Infectious Status (If Applicable):  No results found for: HIV, HEPCAB    COVID-19 Screening (If Applicable): No results found for: COVID19        Anesthesia Evaluation   no history of anesthetic complications:   Airway: Mallampati: II  TM distance: >3 FB   Neck ROM: full  Mouth opening: > = 3 FB   Dental:          Pulmonary:normal exam              Patient did not smoke on day of surgery. Cardiovascular:  Exercise tolerance: poor (<4 METS),   (+) hypertension:, valvular problems/murmurs: MR, CAD:, CABG/stent:, CHF: systolic,                   Neuro/Psych:               GI/Hepatic/Renal:             Endo/Other:    (+) Diabetes, hypothyroidism::., .          Pt had no PAT visit       Abdominal:             Vascular: Other Findings:           Anesthesia Plan      general     ASA 4       Induction: intravenous. arterial line and BANDAR  MIPS: Postoperative opioids intended and Prophylactic antiemetics administered. Anesthetic plan and risks discussed with patient. Plan discussed with CRNA.                     Waldo Mendoza MD   9/14/2022

## 2022-09-14 NOTE — PROGRESS NOTES
Patient, Chana Shannon belongs to [facility]. She want a  to anoint her before her cath lab time, but we did not have a .  I spoke with her brother and sister in law.   A  will be here tomorrow and will anoint her.    09/14/22 1415   Encounter Summary   Encounter Overview/Reason  Spiritual/Emotional Needs   Service Provided For: Family   Referral/Consult From: Rounding;Nursing Supervisor/Manager   Support System Family members  (brother and sister in law)   Last Encounter  09/14/22  (wants to be anointed by )   Complexity of Encounter Low   Begin Time 1415   End Time  1425   Total Time Calculated 10 min   Spiritual/Emotional needs   Type Spiritual Support

## 2022-09-14 NOTE — PLAN OF CARE
Problem: Respiratory - Adult  Goal: Will be able to breathe spontaneously, without ventilator support  Description: Will be able to breathe spontaneously, without ventilator support  Outcome: Progressing  Note: Vent setting optimized to achieve target tidal volume, respiratory rate and ideal oxygen saturations. SBT will be performed when appropriate. Unable to get agreement for goals because no family is present and patient cannot respond.

## 2022-09-14 NOTE — SIGNIFICANT EVENT
sodium chloride Stopped (09/14/22 1044)    sodium chloride      EPINEPHrine 5 mcg/min (09/14/22 1312)    sodium chloride      norepinephrine 20 mcg/min (09/14/22 1312)    midazolam 5 mg/hr (09/14/22 1432)    milrinone        sodium chloride flush  5-40 mL IntraVENous 2 times per day    calcium gluconate          Patient seen by me including key components of medical care. Case discussed with Dr. Martin Matthew. Case discussed with resident physician. Patient requiring massive transfusion post seizure. Patient with complication with cardiac tamponade. Patient has left ventricular dysfunction with low ejection fraction. Agree with transition to intra-aortic balloon pump to augment support. Italicized font, if present,  represents changes to the note made by me. CC time 35 minutes. Time was discontiguous. Time does not include procedure. Time does include my direct assessment of the patient and coordination of care. Time represents more than 50% of the time involved with patient care by the [address] [place] [address] team.  Electronically signed by Terence Evans.  Joshua Rosas MD.

## 2022-09-14 NOTE — ANESTHESIA PROCEDURE NOTES
Procedure Performed: BANDAR       Start Time:        End Time:      Preanesthesia Checklist:  Patient identified, IV assessed, risks and benefits discussed, monitors and equipment assessed, procedure being performed at surgeon's request and anesthesia consent obtained. General Procedure Information  Diagnostic Indications for Echo:  defect repair evaluation, hemodynamic monitoring, suspected pericardial effusion and assessment of valve function  Physician Requesting Echo: Jazmin Cano MD  CPT Code:  32687 [id number]  Location performed:  OR  Intubated  Bite block placed  Heart visualized  Probe Insertion:  Easy  Probe Type:  3D  Modalities:  3D, pulse wave Doppler, color flow mapping and continuous wave Doppler    Echocardiographic and Doppler Measurements    Ventricles    Left Ventricle:  Cavity size normal.  Global Function moderately impaired. Ejection Fraction 35%. Ventricular Regional Function:  1- Basal Anteroseptal:  hypokinetic  2- Basal Anterior:  hypokinetic  3- Basal Anterolateral:  hypokinetic  4- Basal Inferolateral:  hypokinetic  5- Basal Inferior:  hypokinetic  6- Basal Inferoseptal:  hypokinetic  7- Mid Anteroseptal:  hypokinetic  8- Mid Anterior:  hypokinetic  9- Mid Anterolateral:  hypokinetic  10- Mid Inferolateral:  hypokinetic  11- Mid Inferior:  hypokinetic  12- Mid Inferoseptal:  hypokinetic  13- Apical Anterior:  hypokinetic  14- Apical Lateral:  hypokinetic  15- Apical Inferior:  hypokinetic  16- Apical Septal:  hypokinetic  17- [place]:  hypokinetic      Valves    Aortic Valve: Annulus normal.  Stenosis not present. Regurgitation none. Leaflet motions normal.      Mitral Valve: Annulus normal.  Stenosis not present. Regurgitation severe. Leaflets normal.  Leaflet motions normal.      Tricuspid Valve: Annulus normal.  Stenosis not present. Regurgitation mild. Leaflets normal.    Pulmonic Valve: Annulus normal.  Stenosis not present. Regurgitation none.     Other Valve Findings:       Mitral valve evaluated at 0, 60, 90, 120 degrees and 3D. Severe functional, central regurgitation. Mitraclip device placement visualized. Grasp confirmed. Significant reduction in MR volume. Mild to moderate MR persisted. Mean gradient following clip placement 5 mmHg. Aorta    Ascending Aorta:  Size normal.  Dissection not present. Aortic Arch:  Size normal.  Dissection not present. Descending Aorta:  Size normal.  Dissection not present. Atria    Right Atrium:  Size normal.    Left Atrium:  Size normal.  Left atrial appendage normal.      Septa    Atrial Septum:  Intra-atrial septal morphology normal.      Other atrial septal defect findings:       Intra-atrial septal puncture and crossing guided with BANDAR. Left to right defect following procedure. Ventricular Septum:  Intra-ventricular septum morphology normal.          Other Findings  Pulmonary Venous Flow:  systolic flow reversal    Anesthesia Information  Performed Personally  Anesthesiologist:  Wiliam Eid MD    Post Intervention Follow-up Study  Mitral Function: improved2+Comments: Acute pericardial effusion developed during procedure. Dr. Dipti Coronado placed pericardial drain which relieved tamponade. Small residual effusion remained following drain placement but no tamponade physiology.

## 2022-09-14 NOTE — PROCEDURES
EKG completed and given to [facility] ([facility]).

## 2022-09-14 NOTE — H&P
were discussed. The patient had no further questions and wished to proceed; the consent form was signed. MEDICAL HISTORY   has a past medical history of WEI (acute kidney injury) ([facility] [address] Aortic stenosis, severe, Diabetes mellitus ([address] Dilated cardiomyopathy ([address] Hypertension, Hypothyroidism, Mild mitral stenosis, Mild-Severe tricuspid valve regurgitation, and Severe pulmonary hypertension ([address] SURGICAL HISTORY   has a past surgical history that includes Appendectomy (1964); Tonsillectomy (1959); Ovary removal (Right, 1964); Cataract removal with implant (2008); transesophageal echocardiogram (N/A, 06/23/2022); Coronary angioplasty with stent (08/16/2022); and Cataract removal with implant (2006). Additional information:       ALLERGIES   Allergies as of 09/14/2022    (No Known Allergies)     Additional information:       MEDICATIONS     Current Facility-Administered Medications:     0.9 % sodium chloride infusion, , IntraVENous, Continuous, Jayna Grant MD, Last Rate: 75 mL/hr at 09/14/22 0604, New Bag at 09/14/22 0604    sodium chloride flush 0.9 % injection 5-40 mL, 5-40 mL, IntraVENous, 2 times per day, Rock Roman PA-C    sodium chloride flush 0.9 % injection 5-40 mL, 5-40 mL, IntraVENous, PRN, Rock Roman PA-C    0.9 % sodium chloride infusion, , IntraVENous, PRN, Rock Roman PA-C    ceFAZolin (ANCEF) 2000 mg in dextrose 5 % 50 mL IVPB, 2,000 mg, IntraVENous, On Call to OR, Rock Roman PA-C  Prior to Admission medications    Medication Sig Start Date End Date Taking?  Authorizing Provider   sacubitril-valsartan (ENTRESTO) 24-26 MG per tablet Take 1 tablet by mouth 2 times daily  Patient taking differently: Take 0.5 tablets by mouth 2 times daily 9/12/22   SHANNON Bojorquez CNP   clopidogrel (PLAVIX) 75 MG tablet Take 1 tablet by mouth daily 8/24/22   Sharonda Sam MD   metoprolol succinate (TOPROL XL) 25 MG extended release tablet Take 1 tablet by mouth survival, for more than 24 hours. Mallampati I Airway Classification:   []1 []2 [x]3 []4    [x]Pre-procedure diagnostic studies complete and results available. Comment:    [x]Previous sedation/anesthesia experiences assessed. Comment:    [x]The patient is an appropriate candidate to undergo the planned procedure sedation and anesthesia. (Refer to nursing sedation/analgesia documentation record)  [x]Formulation and discussion of sedation/procedure plan, risks, and expectations with patient and/or responsible adult completed. [x]Patient examined immediately prior to the procedure.  (Refer to nursing sedation/analgesia documentation record)    Richmond Fonseca MD MD   Electronically signed 9/14/2022 at 6:22 AM

## 2022-09-15 PROBLEM — R57.0 CARDIOGENIC SHOCK (HCC): Status: ACTIVE | Noted: 2022-01-01

## 2022-09-15 NOTE — PROGRESS NOTES
Patient has been successfully weaned from Mechanical Ventilation. RSBI before extubation was 45 with EtCO2 of 38 and SpO2 of 99 on 30% FiO2. Patient extubated and placed on 2 liters/min via nasal cannula. Post extubation SpO2 is 97% with HR  97 bpm and RR 26 breaths/min. Patient had weak cough that was non-productive. Extubation Well tolerated by patient. Jing Stands

## 2022-09-15 NOTE — PROGRESS NOTES
09/15/22 1331   Encounter Summary   Encounter Overview/Reason  Spiritual/Emotional Needs   Service Provided For: Patient   Referral/Consult From: Other   Kiran [place])   Support System Rastafarian/maite community  ([facility]   Last Encounter  09/15/22   Complexity of Encounter Low   Begin Time 36   Spiritual/Emotional needs   Type Spiritual Support   Assessment/Intervention/Outcome   Assessment Unable to assess   Intervention Prayer (assurance of)/[place];Sustaining Presence/Ministry of presence   Outcome Did not respond      made a follow up visit to introduce self, role and offer spiritual support. Patient rested in bed, unresponsive. No family members are present. Prayers were shared at her bedside. On the bedside table, staff left a note of visit of shared prayers and that the [place] working today was called out of the hospital. An a [place] should visit tomorrow. Spiritual care remains available.

## 2022-09-15 NOTE — PROGRESS NOTES
CRITICAL CARE PROGRESS NOTE      Patient:  Jolie Joseph    Unit/Bed:4D-15/015-A  YOB: 1940  MRN: [id number]   PCP: Gautam Benavidez MD  Date of Admission: 9/14/2022  Chief Complaint:- Cardiac Tamponade    Assessment and Plan:    Cardiac Tamponade: Patient undergoing MitraClip procedure 9/14, massive bleed and developed cardiac tamponade. Pericardial drain was placed, will flush daily. Patient noted to have left ventricular dysfunction with low EF. Intra-aortic balloon pump to augment support placed (9/14). Discussed case with cardiology, plan for removal of IABP pump today (9/15). Await for pericardial drain to have < 50 cc/24hrs and repeat TTE prior to drain removal.   Acute Hypoxic Respiratory Failure: Patient's respiratory status quickly declined post MitraClip procedure secondary to hypovolemic shock from hemorrhage, was intubated during procedure and remains so. CXR demonstrated proper placement of all tubes and mitral valve clip with mild cardiomegaly. Intubated, maintain lung protective strategies. Maintain O2 sat > 94%. After removal of balloon pump, will plan to extubate. Hypovolemic Shock: Hemorrhagic in nature due to massive bleed during Mitraclip procedure developing into cardiac tamponade. At this time, patient became hypotensive, respiratory status declined, and required massive transfusion (PRBC-10 units, FFP-4 units, Platelets-1 unit). LA trending down to normal. No pressor requirement at this time and HR has stabilized. Patient dry, will give additional Albumin and 1 more unit PRBC's, this will also help with CVP. Severe Mitral Regurgitation,  Pulmonary HTN,  Multivessel Disease: Patient had undergone cardiac cath on 6/9/2022. Significant multivessel disease was noted with 100% RCA.   100% left circumflex with large OM1.  60 to 70% in the mid LAD and 90% blockage in the first diagonal. She was taken to the Cath Lab on 8/17/2022 per Dr. Vivian Rondon and a T stent was placed linking the LAD to the diagonal, also noted to have severe symptomatic mitral regurgitation and severe pulmonary HTN. Successful LAD diagonal bifurcation PCI, RCA , and Left Circumflex  done at that time. MitraClip done (9/14) with Dr. Rolf Yadav. Cardiology is primary and following. Labile Blood Pressure: Patient initially hypotensive and requiring pressor support, has since become hypertensive and Nitroglycerin was used overnight, has since been weaned off. Maintain MAP > 65. CKD Stage 3b: Stable. Per result review. Patient eGFR has been in range for stage 3b CKD. Cr remains 1.5 which fits with patient new baseline. Was given Bicarb, patient has also had good urine output. Will monitor labs and strict I/O's. .  Non-Insulin Dependent DM2: A1c from 8/17/22 7.0, controlled for her age. Patient on no medication per chart review. SSI started as glucose numbers have been elevated. Will monitor with accu-checks. Idiopathic Cardiomyopathy and HFrEF: Transesophageal ECHO on 6/23/22 demonstrated severe global hypokinesis. EF 30-35%, Left atrium size severely dilated with no thrombus. Hypothyroidism: TSH normal in 5/22. On home synthroid. INITIAL H AND P AND ICU COURSE:  Patient is 80year old female with PMHx of Severe Mitral Regurgitation, Pulmonary HTN, Multivessel Disease, DM2, HTN, Idiopathic Cardiomyopathy, HFrEF, Hypothyroidism. Patient presented to [facility] on 9/14 for MitraClip procedure due to severe mitral regurgitation noted on previous diagnostic cath in August 2022. During procedure, massive hemorrhagic bleed noted that led to Cardiac Tamponade. At that time, the patient became hypotensive (SBP in 40's), Tachycardic ('s), and respiratory status decreased. Patient presented to ICU at this time, patient was in need of massive transfusion. Levo and Epi started pericardial drain was placed and atrium placed at -40, later adjusted to -10 with better results.  Patient noted to have left ventricular dysfunction with low EF. Patient to return back to Cath lab for planned transition to intra-aortic balloon pump to augment support. 9/15:  Patient is off pressor support and became hypertensive overnight. Started on Nitroglycerin, has since been stopped. Discussed case with Dr. Ferny Clark, will plan to give Albumin and additional unit of PRBC's to help with CVP. Plan to remove IABP today and possible extubation after procedure. Will leave pericardial drain in until patient has < 50 cc/24 hrs. Past Medical History:  As per HPI. Family History: Mother , HTN, DM, Heart Disease. Father , Lung Cancer. Social History:  Lifelong non-smoker, no alcohol use, no illicit drug use. ROS   Unable to attain at this time as patient is intubated. Scheduled Meds:   insulin lispro  0-16 Units SubCUTAneous TID WC    insulin lispro  0-4 Units SubCUTAneous Nightly    sodium chloride flush  5-40 mL IntraVENous 2 times per day    [Held by provider] heparin (porcine)  5,000 Units SubCUTAneous BID    atorvastatin  10 mg Oral Daily    levothyroxine  100 mcg Oral Daily    albumin human  25 g IntraVENous Q6H    calcium replacement protocol   Other RX Placeholder     Continuous Infusions:   sodium chloride      norepinephrine 2 mcg/min (09/15/22 05)    midazolam 5 mg/hr (09/15/22 050)    milrinone      [Held by provider] sodium bicarbonate infusion Stopped (09/15/22 0348)    sodium chloride      nitroGLYCERIN 5 mcg/min (09/15/22 050)    dextrose         PHYSICAL EXAMINATION:  T:  97.2 (36.2). P: 74. RR: 17. B/P: 119/19. FiO2: 30%. O2 Sat: 100. I/O:  past 24hrs +1508 (has had good urine output however, skewed due to massive transfusion)  Body mass index is 20.28 kg/m². GCS: 6  PC: 14/6: TV: 405: RRTotal: 17: Ti:1 sec:  General:   Elderly female, intubated, resting comfortably in bed  HEENT:  normocephalic and atraumatic. No scleral icterus. PERR  Neck: supple. No Thyromegaly. Lungs: clear to auscultation. No retractions  Cardiac: RRR. No JVD. Pericardial drain in place. Abdomen: soft. Nontender. Extremities:  No clubbing, cyanosis, or edema x 4. Vasculature: capillary refill < 3 seconds. Palpable dorsalis pedis pulses. Skin:  warm and dry. Psych:  Unable to assess as patient is intubated  Lymph:  No supraclavicular adenopathy. Neurologic:  No focal deficit. No seizures. Will awaken and open eyes, follows some commands. Data: (All radiographs, tracings, PFTs, and imaging are personally viewed and interpreted unless otherwise noted). Sodium 144 Potassium 4.0 Cl 105 CO2 26 BUN 25 Cr 1.5 AG 13.0 Ionized Ca 1.05 eGFR 33 LA 2.7 Glucose 221 ProBNP 91690  WBC 7.2  Hgb 9.6  Hct 27.5 MCV 86.0  ABG pH 7.44 PCO2 40 pO2 141 HCO3 27  EKG demonstrated NSR with left axis deviation   Telemetry shows NSR      Seen with multidisciplinary ICU team.  Meets Continued ICU Level Care Criteria:    [x] Yes   [] No - Transfer Planned to listed location:  [] HOSPITALIST CONTACTED- DR     Case and plan discussed with Dr. Reno Leos. Electronically signed by DO Sergio Aponte   Patient seen by me including key components of medical care. Case discussed with Dr. Mayra Coronado. Case discussed with resident physician. IABP removed. Doing well with SBT. Will extubate. Italicized font, if present,  represents changes to the note made by me. CC time 35 minutes. Time was discontiguous. Time does not include procedure. Time does include my direct assessment of the patient and coordination of care. Time represents more than 50% of the time involved with patient care by the [address] [place] [address] team.  Electronically signed by Deanne Hammans.  Reno Leos MD.

## 2022-09-15 NOTE — FLOWSHEET NOTE
0800 Mrs Brenda Valderrama rests in the bed. She appears to have anxiety as she is attempting to sit up and pull at her IV's and ETT her sys BP has elevated to the 180's. She is unable to follow commands. She was given IV fentanyl and IV versed for sedation. These were given through a CVC left SC site clear. She is intubated with 7.0 ETT at 21 cm to her lips. She has an OGT which is currently clamped. She has a temp sensing robledo patent to gravity. She has pericardial drain to 10 cm suction through an atrium chest tube drain. She has an IABP right groin site clear with good wave forms. She has a right radial art line with good wave forms. Plan for 1 unit PRBC's and give albumin to try to raise her CVP and remove the IABP.

## 2022-09-15 NOTE — PROGRESS NOTES
Inpatient Cardiac Rehabilitation Consult    No order received yet for patient following MitraClip procedure. Patient currently on vent, not appropriate at this time.

## 2022-09-15 NOTE — FLOWSHEET NOTE
1525 Cath lab in to see. Dr Luyc Allison in and IABP has been removed. She seems to tolerate this well.

## 2022-09-15 NOTE — PROGRESS NOTES
Patient Weaning Progress    The patient's vent settings was able to be weaned this shift. Ventilator settings that were weaned              [] Mode   [] Pressure support weaned   [x] Fio2 weaned   [] Peep weaned             Spontaneous weaning trial  was attempted. Evac tube was not  hooked up with continuous low suction(20-30mmHg)      Cuff was  deflated to determine cuff leak. A leak  was detected.     *Specific details of weaning located in Ventilator documentation flowsheets*

## 2022-09-15 NOTE — PLAN OF CARE
Problem: Respiratory - Adult  Goal: Will be able to breathe spontaneously, without ventilator support  Description: Will be able to breathe spontaneously, without ventilator support  9/15/2022 1740 by Lady Kong RN  Note: Extubate today  9/15/2022 1028 by Teto Correa RCP  Outcome: Progressing  9/15/2022 0434 by Madisyn Shah RCP  Outcome: Progressing  9/15/2022 0434 by Madisyn Shah RCP  Outcome: Progressing     Problem: Chronic Conditions and Co-morbidities  Goal: Patient's chronic conditions and co-morbidity symptoms are monitored and maintained or improved  Flowsheets (Taken 9/15/2022 1740)  Care Plan - Patient's Chronic Conditions and Co-Morbidity Symptoms are Monitored and Maintained or Improved: Monitor and assess patient's chronic conditions and comorbid symptoms for stability, deterioration, or improvement     Problem: Discharge Planning  Goal: Discharge to home or other facility with appropriate resources  Flowsheets (Taken 9/15/2022 1740)  Discharge to home or other facility with appropriate resources: Refer to discharge planning if patient needs post-hospital services based on physician order or complex needs related to functional status, cognitive ability or social support system     Problem: Pain  Goal: Verbalizes/displays adequate comfort level or baseline comfort level  Flowsheets (Taken 9/15/2022 1740)  Verbalizes/displays adequate comfort level or baseline comfort level: Assess pain using appropriate pain scale     Problem: ABCDS Injury Assessment  Goal: Absence of physical injury  Flowsheets (Taken 9/15/2022 1740)  Absence of Physical Injury: Implement safety measures based on patient assessment     Problem: Safety - Adult  Goal: Free from fall injury  Flowsheets (Taken 9/15/2022 1740)  Free From Fall Injury: Instruct family/caregiver on patient safety     Problem: Skin/Tissue Integrity  Goal: Absence of new skin breakdown  Description: 1.   Monitor for areas of redness and/or skin breakdown  2. Assess vascular access sites hourly  3. Every 4-6 hours minimum:  Change oxygen saturation probe site  4. Every 4-6 hours:  If on nasal continuous positive airway pressure, respiratory therapy assess nares and determine need for appliance change or resting period. Note: Frequent position changes. Problem: Nutrition Deficit:  Goal: Optimize nutritional status  Flowsheets (Taken 9/15/2022 5767)  Nutrient intake appropriate for improving, restoring, or maintaining nutritional needs: Assess nutritional status and recommend course of action  Care plan reviewed with patient and family. Patient and family verbalize understanding of the plan of care and contribute to goal setting.

## 2022-09-15 NOTE — CONSULTS
Comprehensive Nutrition Assessment    Type and Reason for Visit:  Initial (New Vent)    Nutrition Recommendations/Plan:   If nutrition support if desired, recommend starting Nepro at 10 ml/hr, increasing by 10 ml/hr every 8 hours as tolerated until reach goal rate of 30 ml/hr to provide: 1274 kcals, 58 gms protein, 115 gms CHO, 18 gms fiber and 523 ml free water in total volume of 720 ml. Additional free water flushes per Physician. Malnutrition Assessment:  Malnutrition Status:  Insufficient data (09/15/22 1423)    Context:  Acute Illness     Findings of the 6 clinical characteristics of malnutrition:  Energy Intake:  Unable to assess (NPO x1 day)  Weight Loss:   (-4.8% weight loss in 3 months)     Body Fat Loss:  No significant body fat loss     Muscle Mass Loss:  Mild muscle mass loss Temples (temporalis)  Fluid Accumulation:  No significant fluid accumulation Extremities   Strength:  Not Performed    Nutrition Assessment: Pt. nutritionally compromised AEB NPO at present. At risk for further nutrition compromise r/t s/p MitraClip procedure on 9/14 then developed massive bleed and cardiac tamponade s/p immediate pericardiocentesis, IABP placed on 9/15, intubated 9/14 and underlying medical condition (Hx: DM, HTN, CKD). Nutrition Related Findings:    Pt. Report/Treatments/Miscellaneous: s/p MitraClip procedure on 9/14 then developed massive bleed and cardiac tamponade, intubated on 9/14  GI Status: No BM since admit. Pertinent Labs: 9/15/22:  BUN 25, Cr. 1.5, POC Glucose 221, POC Glucose 185.   Pertinent Meds: Lipitor, Humalog, Versed,  Nitroglycerin, Levophed,     Wound Type: None       Current Nutrition Intake & Therapies:    Average Meal Intake: NPO  Average Supplements Intake: NPO  Diet NPO Exceptions are: Sips of Water with Meds    Anthropometric Measures:  Height: 5' 4\" (162.6 cm)  Ideal Body Weight (IBW): 120 lbs (55 kg)    Admission Body Weight: 113 lb 15.7 oz (51.7 kg) (9/14; no edema noted)  Current Body Weight: 118 lb 2.7 oz (53.6 kg) (9/15; no edema noted)  Current BMI (kg/m2): 20.3  Usual Body Weight:  (Per EMR: 109 lb 6.4 oz on 8/16/22; 124 lb 9.6 oz on 6/20/22)  BMI Categories: Normal Weight (BMI 18.5-24. 9)    Estimated Daily Nutrient Needs:  Energy Requirements Based On: Kcal/kg  Energy (kcal/day): 1653-0401 kcal/day (20-25 kcal/kg)  Weight Used for Protein Requirements: Current (53.6 kg on 9/15)  Protein (g/day): 80+ g/day (1.5+ g/kg)  Fluid (ml/day): per Physician    Nutrition Diagnosis:   Inadequate oral intake related to impaired respiratory function as evidenced by NPO or clear liquid status due to medical condition, intubation    Nutrition Interventions:   Food and/or Nutrient Delivery: Continue NPO  Nutrition Education/Counseling: No recommendation at this time  Coordination of Nutrition Care: Continue to monitor while inpatient, Interdisciplinary Rounds    Goals:  Goals: Initiate nutrition support, by next RD assessment    Nutrition Monitoring and Evaluation:   Behavioral-Environmental Outcomes: None Identified  Food/Nutrient Intake Outcomes:  (NPO status)  Physical Signs/Symptoms Outcomes: Biochemical Data, GI Status, Weight, Skin, Nutrition Focused Physical Findings, Fluid Status or Edema    Discharge Planning:     Too soon to determine     Keven Munroe MS, RD, LD  Contact: [phone]

## 2022-09-15 NOTE — PLAN OF CARE
Problem: Respiratory - Adult  Goal: Will be able to breathe spontaneously, without ventilator support  Description: Will be able to breathe spontaneously, without ventilator support  9/15/2022 0434 by Rashmi Castorena RCP  Outcome: Progressing   Patient doing well on vent. Unable to discuss care plan with patient or family. Will continue to monitor.

## 2022-09-15 NOTE — ANESTHESIA POSTPROCEDURE EVALUATION
Department of Anesthesiology  Postprocedure Note    Patient: Magdiel Elliott  MRN: [id number]  YOB: 1940  Date of evaluation: 9/15/2022      Procedure Summary     Date: 09/14/22 Room / Location: [facility] CATH LAB    Anesthesia Start: 0733 Anesthesia Stop: 1134    Procedure: STR LUZ CLIP W ANES Diagnosis:     Scheduled Providers: Royal Avila MD Responsible Provider: Royal Avila MD    Anesthesia Type: general ASA Status: 4          Anesthesia Type: No value filed. Robert Phase I:      Robert Phase II:        Anesthesia Post Evaluation    Patient location during evaluation: ICU  Patient participation: complete - patient participated  Level of consciousness: awake  Airway patency: patent  Complications: no  Cardiovascular status: hemodynamically stable  Respiratory status: acceptable  Comments: Improving after critical status following procedure. Plan to remove IABP and extubate.

## 2022-09-15 NOTE — PROCEDURES
[address] [place] [address]                            CARDIAC CATHETERIZATION    PATIENT NAME: Brigette Suarez                  :        1940  MED REC NO:   [id number]                           ROOM:       0015  ACCOUNT NO:   [de-identified]                           ADMIT DATE: 2022  PROVIDER:     Reid Vieyra MD    DATE OF PROCEDURE:  2022    INDICATION:  Cardiogenic shock, status post MitraClip. DESCRIPTION OF PROCEDURE:  After informed consent was obtained from the  patient's family, she was brought to the cardiac catheterization  laboratory and prepped in sterile fashion. The patient had developed  initially cardiac tamponade during MitraClip procedure that was treated  with a pericardial drain which was stable, and the patient has not  continued to bleed. However, the patient's LV had completely failed,  and EF had dropped to 55%. This was expected, but the profound nature  of the drop in the V-wave likely resulted in significant increase in  Afterload/preload on LV that resulted in cardiogenic shock. She was  requiring high-dose pressors, 20 of Levophed and 5 of epinephrine, and  given the fact that this was a relatively acute issue that should  resolve in the setting of MitraClip, we elected to place a balloon pump  for hemodynamic augmentation. After infiltration of the right inguinal region with 2% lidocaine using  micropuncture and modified Seldinger technique under fluoroscopic  guidance and ultrasound guidance, I was able to insert a 4-Montserratian  micropuncture sheath in the right femoral artery. Angiography was done  demonstrating common femoral artery puncture. Over a 0.035 Supra Core  wire, I placed a standard 7. 5-Montserratian balloon pump sheath. Then, given  her height, over a 0.025 J-tipped guidewire inserted a 40 mL  intra-aortic balloon pump. The wire was then removed.   The central port  was flushed. We then hooked up to the console and augmented the patient  one to one. Immediately upon one-to-one augmentation, the patient's  blood pressure was 190 mmHg. Levophed was rapidly down-titrated as was  the epinephrine. We had to turn down the augmentation to one to two at  that time. Levophed was then left at around 5 mcg per minute, and she  was transferred to the ICU in stable condition. All access sites were  sutured in place. The patient tolerated the procedure well. Given that  she had had recent cardiac tamponade, no heparin was given, and she was  changed back to one to one to avoid concern for thrombosis. IMMEDIATE COMPLICATIONS:  None. MEDICATIONS:  See EMR. ACCESS:  See above. ESTIMATED BLOOD LOSS:  Less than 20 mL. SUMMARY:  Successful 40 mL IABP insertion via the right femoral artery. PLAN:  1. Continue ICU management. 2.  Titrate down pressors as tolerated. 3.  The patient's hemodynamics improved rapidly, and she may need  nitroglycerin in the setting of severe increase in afterload. 4.  ICU management as tolerated per protocol. All the above was explained to the patient and the patient's family. They are agreeable and amenable to the plan.         Peewee Aguilera MD    D: 09/15/2022 6:01:05       T: 09/15/2022 9:08:27     [email or web address]  Job#: [id number]     Doc#: [id number]    CC:

## 2022-09-15 NOTE — PLAN OF CARE
Problem: Chronic Conditions and Co-morbidities  Goal: Patient's chronic conditions and co-morbidity symptoms are monitored and maintained or improved  Outcome: Progressing  Flowsheets (Taken 9/14/2022 2000)  Care Plan - Patient's Chronic Conditions and Co-Morbidity Symptoms are Monitored and Maintained or Improved:   Monitor and assess patient's chronic conditions and comorbid symptoms for stability, deterioration, or improvement   Collaborate with multidisciplinary team to address chronic and comorbid conditions and prevent exacerbation or deterioration   Update acute care plan with appropriate goals if chronic or comorbid symptoms are exacerbated and prevent overall improvement and discharge     Problem: Discharge Planning  Goal: Discharge to home or other facility with appropriate resources  Outcome: Progressing  Flowsheets (Taken 9/14/2022 2000)  Discharge to home or other facility with appropriate resources: Identify barriers to discharge with patient and caregiver     Problem: Pain  Goal: Verbalizes/displays adequate comfort level or baseline comfort level  Outcome: Progressing  Flowsheets (Taken 9/14/2022 2000)  Verbalizes/displays adequate comfort level or baseline comfort level:   Assess pain using appropriate pain scale   Administer analgesics based on type and severity of pain and evaluate response   Implement non-pharmacological measures as appropriate and evaluate response     Problem: Respiratory - Adult  Goal: Will be able to breathe spontaneously, without ventilator support  Description: Will be able to breathe spontaneously, without ventilator support  9/14/2022 2312 by Mounika Patel RN  Outcome: Progressing  9/14/2022 1543 by Luis A Alexandre RCP  Outcome: Progressing  Note: Vent setting optimized to achieve target tidal volume, respiratory rate and ideal oxygen saturations. SBT will be performed when appropriate.     Unable to get agreement for goals because no family is present and patient cannot respond. Problem: ABCDS Injury Assessment  Goal: Absence of physical injury  Outcome: Progressing     Problem: Safety - Adult  Goal: Free from fall injury  Outcome: Progressing  Flowsheets (Taken 9/14/2022 6120)  Free From Fall Injury: Instruct family/caregiver on patient safety     Problem: Skin/Tissue Integrity  Goal: Absence of new skin breakdown  Description: 1. Monitor for areas of redness and/or skin breakdown  2. Assess vascular access sites hourly  3. Every 4-6 hours minimum:  Change oxygen saturation probe site  4. Every 4-6 hours:  If on nasal continuous positive airway pressure, respiratory therapy assess nares and determine need for appliance change or resting period.   Outcome: Progressing

## 2022-09-15 NOTE — CARE COORDINATION
9/15/22, 11:12 AM EDT    DISCHARGE ON [address] day: 1  Location: -15/015-A Reason for admit: Mitral leaflet abnormality [Q23.9]  Cardiac tamponade [I31.4]   Procedure:   9/14   MitraClip XTW x 1  Attempted placement of second clip, however, clip was against interatrial septum and she developed acute cardiac tamponade  Immediate pericardiocentesis s/p 1700 cc removed  9/14 IABP placed  Barriers to Discharge: POD 1. Remains on vent with ETT: AC/PC 30% fio2, peep 6. Versed gtt. NTG gtt. Levo @ 2mcg. IV albumin. Pericardial drain. Plan repeat TTE today. Did follow commands then re-sedated. PCP: Yuliya Parsons MD  Readmission Risk Score: 17.2%  Patient Goals/Plan/Treatment Preferences: Pt lives in a [id number] [place] [address] [place] with a cousin. Pt is independent and drives. Denies any DME, but does have grab bars installed in bathroom. Follow for needs.

## 2022-09-15 NOTE — PROGRESS NOTES
[address] THERAPY MISSED TREATMENT NOTE  [facility] ICU 4D  4D-15/015-A      Date: 9/15/2022  Patient Name: Jovita Beckwith        CSN: [id number]   : 1940  (80 y.o.)  Gender: female                REASON FOR MISSED TREATMENT: Hold Treatment per Nursing; patient has balloon pump in place and is intubated. Not appropriate for early mobility this date. OT to attempt back another day. Yes - the patient is able to be screened

## 2022-09-15 NOTE — BRIEF OP NOTE
[facility]  Sedation/Analgesia Post Sedation Record    Pt Name: Marty Perrin  Account number: [de-identified]  MRN: [id number]  YOB: 1940  Procedure Performed By: MD MD Pacheco Rojas, [address]  Primary Care Physician: Gayatri Rose MD  Date: 9/15/2022    POST-PROCEDURE    Physicians/Assistants: MD MD Pacheco Rojas, QI    Procedure Performed: IABP       Sedation/Anesthesia: Versed/ Fentanyl and 2% xylocaine local anesthesia. Estimated Blood Loss: < 50 ml. Specimens Removed: None         Disposition of Specimen: N/A        Complications: No Immediate Complications.        Post-procedure Diagnosis/Findings:       40 cc IABP via 7Fr sheath RFA         MD MD Pacheco Rojas, QI  Electronically signed 9/15/2022 at 5:34 AM  Interventional Cardiology

## 2022-09-15 NOTE — FLOWSHEET NOTE
[id number] The versed gtt was stopped. Mrs Nusrat Ace has awakened and is able to follow simple commands. She has been placed on Spontaneous mode through the vent. RT notified to come extubate when ready.

## 2022-09-15 NOTE — PROGRESS NOTES
[address]  PHYSICAL THERAPY MISSED TREATMENT NOTE  [facility] ICU 4D    Date: 9/15/2022  Patient Name: Marty Perrin        MRN: [id number]   : 1940  (80 y.o.)  Gender: female                REASON FOR MISSED TREATMENT:  Missed Treat. Patient has balloon pump in place and is intubated. Not appropriate for early mobility this date. PT to check back next available date as pt medically appropriate.      Max Horowitz PT, DPT

## 2022-09-15 NOTE — PROGRESS NOTES
Cardiology Progress Note      Patient:  Briana Perez  YOB: 1940  MRN: [id number]   Acct: [de-identified]  Admit Date:  9/14/2022  Primary Cardiologist:  Gera Simpson    Chief Complaint: s/p Clip    Subjective (Events in last 24 hours):   Pericardial drainage has slowed  Hgb stable  Off pressors  IABP 1:1   Needs NTG gtt  CVP low   Sedation minimal  Minimal vent support      Objective:   BP (!) 140/94   Pulse 73   Temp 97.2 °F (36.2 °C) (Bladder)   Resp 12   Ht 5' 4\" (1.626 m)   Wt 118 lb 2.7 oz (53.6 kg)   SpO2 100%   BMI 20.28 kg/m²        TELEMETRY: NSR    Physical Exam:  General Appearance: Intubated/sedated  Cardiovascular: normal rate, regular rhythm, normal S1 and S2, no murmurs, rubs, clicks, or gallops, distal pulses intact, no carotid bruits, no JVD  Pulmonary/Chest: clear to auscultation bilaterally- no wheezes, rales or rhonchi, normal air movement, no respiratory distress  Abdomen: soft, non-tender, non-distended, normal bowel sounds, no masses Extremities: no cyanosis, clubbing or edema, pulse   Skin: warm and dry, no rash or erythema  Head: normocephalic and atraumatic  Eyes: pupils equal, round, and reactive to light  Neck: supple and non-tender without mass, no thyromegaly   Musculoskeletal: normal range of motion, no joint swelling, deformity or tenderness  Neurological: Intubated/sedated    Medications:    insulin lispro  0-16 Units SubCUTAneous TID     insulin lispro  0-4 Units SubCUTAneous Nightly    calcium gluconate  1,000 mg IntraVENous Once    sodium chloride flush  5-40 mL IntraVENous 2 times per day    [Held by provider] heparin (porcine)  5,000 Units SubCUTAneous BID    atorvastatin  10 mg Oral Daily    levothyroxine  100 mcg Oral Daily    albumin human  25 g IntraVENous Q6H    calcium replacement protocol   Other RX Placeholder      sodium chloride      norepinephrine 1 mcg/min (09/15/22 0621)    midazolam 5 mg/hr (09/15/22 0621)    milrinone      [Held by provider] sodium bicarbonate infusion Stopped (09/15/22 0348)    sodium chloride      nitroGLYCERIN Stopped (09/15/22 6242)    dextrose       sodium chloride flush, 5-40 mL, PRN  sodium chloride, , PRN  ondansetron, 4 mg, Q8H PRN   Or  ondansetron, 4 mg, Q6H PRN  polyethylene glycol, 17 g, Daily PRN  acetaminophen, 650 mg, Q6H PRN   Or  acetaminophen, 650 mg, Q6H PRN  sodium chloride, , PRN  fentanNYL, 25 mcg, Q1H PRN  glucose, 4 tablet, PRN  dextrose bolus, 125 mL, PRN   Or  dextrose bolus, 250 mL, PRN  glucagon (rDNA), 1 mg, PRN  dextrose, , Continuous PRN    Lab Data:    Cardiac Enzymes:  No results for input(s): CKTOTAL, CKMB, CKMBINDEX, TROPONINI in the last 72 hours.     CBC:   Lab Results   Component Value Date/Time    WBC 7.5 09/14/2022 04:20 PM    RBC 3.95 09/14/2022 04:20 PM    HGB 9.7 09/15/2022 04:20 AM    HCT 27.6 09/15/2022 04:20 AM     09/14/2022 04:20 PM       CMP:    Lab Results   Component Value Date/Time     09/15/2022 04:20 AM    K 4.1 09/15/2022 04:20 AM    K 4.7 09/14/2022 04:20 PM     09/15/2022 04:20 AM    CO2 27 09/15/2022 04:20 AM    BUN 24 09/15/2022 04:20 AM    CREATININE 1.5 09/15/2022 04:20 AM    GFRAA 27 07/12/2022 09:39 AM    LABGLOM 33 09/15/2022 04:20 AM    GLUCOSE 267 09/15/2022 04:20 AM    CALCIUM 8.5 09/15/2022 04:20 AM       Hepatic Function Panel:    Lab Results   Component Value Date/Time    ALKPHOS 81 09/14/2022 05:31 AM    ALT 10 09/14/2022 05:31 AM    AST 19 09/14/2022 05:31 AM    PROT 7.2 09/14/2022 05:31 AM    BILITOT 0.5 09/14/2022 05:31 AM    LABALBU 3.7 09/14/2022 05:31 AM       Magnesium:    Lab Results   Component Value Date/Time    MG 1.7 09/14/2022 04:20 PM       PT/INR:    Lab Results   Component Value Date/Time    INR 1.09 09/14/2022 05:31 AM       HgBA1c:    Lab Results   Component Value Date/Time    LABA1C 7.0 08/16/2022 08:57 PM       FLP:    Lab Results   Component Value Date/Time    TRIG 89 08/16/2022 08:57 PM    HDL 31 08/16/2022 08:57 PM    LDLCALC 32 08/16/2022 08:57 PM       TSH:    Lab Results   Component Value Date/Time    TSH 0.59 05/25/2022 12:10 PM         Assessment:  S/p MitraClip XTW x 1 at A2-P2  Cardiac Tamponade 2/2 probable RA injury with clip advancement s/p pericardial drain  Cardiogenic Shock 2/2 acute increase in afterload with successful MitraClip with V wave from 80 to 25, EF dropped from 35 to 5%, SCAI C, s/p IABP  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  Hx of LAD/Dx PCI, 8/17/22  CKD, stage 3b  NIDDM    Plan:  Improved hemodynamics, was needing NTG gtt for pressures, put out - 2.3L with one dose of Lasix, IV albumin and PRBCs  Woke up following commands, re-sedated  Wean IABP today and remove later this afternoon  Monitor Hgb  Pericardial drain to stay in place  CXR is clear, vent settings minimal, extubate after IABP out   Leave drain in place till < 50 cc/24 hours, and repeat TTE prior to drain removal  Repeat TTE today prior to IABP explant on 1:3  Will assess for DAPT later today - she had Plavix yesterday   Cr stable  Hold diuretics  Replace calcium  Further recommendations based on results and clinical course  Greater than 60 minutes of time was spent managing critical issues, discussing with the family, and coordinating care.       Electronically signed by Deb Sharp MD on 9/15/2022 at 6:22 AM

## 2022-09-16 NOTE — PROGRESS NOTES
IABP Explant    After informed consent, labs were reviewed, the patient was weaned appropriately from 1:1 to 1:3 with stable hemodynamics. IABP was then disconnected, aspirated, and manually removed by myself without complications. Perclose used for hemostasis. 10 minutes manual pressure afterwards    No complications    EBL < 10 cc.     Summary:  IABP Explant    Plan: Bedrest x 2 hours, Extubate after groin rest, will initiate medical therapy for CHF in the upcoming days; pericardial drain to be left in place overnight, if no bleeding, will d/c in the AM.

## 2022-09-16 NOTE — PROGRESS NOTES
Patient seen and evaluated in 4B05. Called to assess Woggle site R groin. R groin site dressing D & I, woggle with suture intact. Woggle and suture removed as discussed with Dr. Onur Velarde. Right groin site without bleeding, swelling or hematoma with suture removed. Dressing applied to site. Patient tolerated well. Left groin site dressing dry and intact. No hematoma, bleeding or swelling post IABP removal.  Lower extremity pulses intact bilaterally. Patient tolerated suture removal well.   Isha Pagan, APRN - CNP

## 2022-09-16 NOTE — PROCEDURES
[address] [id number]                            CARDIAC CATHETERIZATION    PATIENT NAME: Isaiah Cano                  :        1940  MED REC NO:   [id number]                           ROOM:       0015  ACCOUNT NO:   [de-identified]                           ADMIT DATE: 2022  PROVIDER:     Consuelo Tierney MD    DATE OF PROCEDURE:  2022    PROCEDURE:  Percutaneous mitral valve repair (MitraClip). INDICATION:  Severe symptomatic LV systolic failure with reduced  ejection fraction, NYHA class III CHF, severe degenerative and  functional mitral regurgitation, not a candidate for open heart surgery. DESCRIPTION OF PROCEDURE:  After informed consent was obtained from the  patient, she was brought to the cardiac catheterization laboratory and  prepped in sterile fashion. Right femoral vein was chosen as the  primary point of access. Preprocedure timeout was completed. BANDAR probe  was inserted by Dr. Tiffani Mendez. Please see the note for further details. The  patient was anesthetized and intubated per Dr. Tiffani Mendez as well. No left  atrial or left atrial appendage thrombus was seen and it was deemed  appropriate to proceed with the procedure. After infiltration of right inguinal region with 2% lidocaine using  micropuncture and modified Seldinger technique under fluoroscopic  guidance and ultrasound guidance, I was able to insert an 8-Tanzanian  sheath in the right femoral vein over a 0.035 Supra core wire. After  that, we accessed with an 18-Tanzanian sheath. 2000 units of heparin IV  were given. Using a preshaped 8.5-Tanzanian Diboll VersaCross sheath into  the SVC over a 0.035 Supra Core wire. I exchanged out for a  Diboll transeptal wire.   I then, using BANDAR guidance, performed a  superior posterior transeptal puncture with a height of approximately  4.5 to 5 cm above the mitral annulus across the intraatrial septum  without any complication. Exchanged out for a prepped steerable guiding  catheter that was prepped per 's recommendations and  properly deaired in through this with significant minus on the guide  into the right atrium and then neutralized the guide and advanced it  across the intraatrial septum. Once I was able to cross, we aspirated  the guide, removed the dilator and wire, confirmed to be at about 2 to 3  cm of guide into the left atrium. A standard MitraClip XTW system was  prepped per 's recommendations. All motions were checked  outside the body under wet-to-wet connection. This was inserted into  the steerable guiding catheter. Once that was in position, I straddled  the clip using a combination of 2D and 3D guidance. I maneuvered the  clip towards the mitral annulus using medial to flexion and posterior  tilt. Once I was at the level of the annulus and perpendicular to the  plane of the mitral valve, the clip was opened, again using a  combination of 2D and 3D guidance, I landed the clip at the lateral  aspect of A2P2. We checked the dials of the clip in 60 degrees. I  checked that the gripper orientation was posterior and was associated  with a blue marker. I closed the clip to 60 degrees advancing the left  ventricle. Of note, heparin IV had already been given, this assisted to  cross the septum and the ACT was confirmed to be about 250 seconds. I  then opened the clip to 120 degrees. I then used the slow and steady  withdrawal of the clip. I was able to make contact with the anterior  and posterior mitral valve leaflets. A slight cock was placed on the  entire system to help grasp posterior leaflets for pressure drop. We  kept from the gripper bounce in multiple views. I then slowly closed  the clip to 60 degrees. I felt that I had a good grasp and the arms  were definitely grabbing the anterior and posterior leaflets. I clocked  the clip and closed it completely.   At start of the procedure prior to  clip insertion LA pressure was up to 80 mmHg. At the closure of the clip, we had  dropped to about 25 mmHg. Gradient across the valve was approximately 5  mmHg mean. Ejection fraction at the start of the procedure was 30% to  35%. After the clip was placed, the ejection fraction had dropped quite  significantly to around 10%. There was trace mitral regurgitation at  the end of the procedure after we grasped the clip. We gave tension  back and still there was trace mitral regurgitation on both sides of the  valve. I assessed the grasp in all views and assessed the color flow in  the mitral valve in all views. We had trace mitral regurgitation at the  end of the assessment and we agreed to deploy the clip and reassess  mitral valve at that point. I checked the final arm angle after locking  the clip completely and the clip did not open. I removed the lock line  and tested the arm angle guide, it did not move. At that point, I  removed the pin and exposed the groove, turned the anterior knob 8 times  counterclockwise and then pulled the x-ray knob back, pulled the  grippers back, and then pulled the whole clip delivery system back. At  that point, clip was released, it was extremely stable. All motions of  the clip were reversed and the clip delivery system was removed from the  steerable guiding catheter without any issues under an aspiration. We reassessed the mitral regurgitation and she had developed a  mild-to-moderate mitral regurgitation medial to the first clip placed. We still had a gradient of about 4 to 5 mmHg. We felt that a MitraClip  NTR system would be sufficient to treat the residual mitral  regurgitation. However, we were concerned related to the low ejection  fraction.   She was maintaining reasonable stability and pressors were  not being used at that point though we felt that the degree of  regurgitation would get worse when she would ambulate, and therefore it  would be optimal to try to reduce this to at least trace to mild before  finishing the procedure. We began to prep the standard MitraClip NTR  system per 's recommendations. The BANDAR was checked and  there was no significant pericardial effusion at that time. I then  inserted the MitraClip NTR system under wet-to-wet connection into the  steerable guiding catheter. As I advanced the clip to the straddle  position, I noted that the clip was not visible in the left atrium. We  checked in multiple views, but the clip was not visible in the left  atrium, however, it did not appear that the guide had changed positions. Imaging became difficult and at the point that we felt that we were in  the left atrium, we could not evaluate the clip in two views. At that  moment, I elected to pull the clip back into the clip delivery system to  help obtain better imaging. At that moment, the patient's blood  pressure dropped to 6 mmHg and it was completely stable prior to that. We checked immediately and there was concern of developing pericardial  effusion on the right side. It was likely that the entire clip delivery  system after the first clip was removed, the septum had, which was  slightly aneurysmal, and worked its way towards the left atrium and the  clip delivery system had come out of the left atrium, therefore when the  clip was re-advanced out, it was in the right atrium. At that point,  likely there was a small perforation/laceration of the posterior wall of  the right atrium. Because of the pericardial effusion, I elected to  remove the clip delivery system from the right atrium completely at that  time.   She had mild mitral regurgitation, but it appeared that the  V-wave had reasonably decreased and that she had already dropped her  ejection fraction, and we felt that majority of the mitral regurgitation  had been matched at that time and would not be worth further  intervention given the fact that she was developing this effusion. Effusion became significant and I elected to reverse the anticoagulation  immediately. While that was being done, I prepped for a standard  pericardiocentesis with a needle kit. We prepped the subxiphoid region  as well. Using BANDAR guidance, I was able to access and I did a thorough  pericardiocentesis with negative aspiration, confirmed I was in the  pericardial space. I advanced a wire under fluoroscopy confirming again  I was in the pericardial space. I inserted a 5-Slovenian long sheath  dilator, did a bubble study confirming I was in the pericardial space  once again on BANDAR, and then hooked up to exchange out over 0.035 short  75 cm Amplatz wire for an 8.5-Slovenian pigtail catheter. Once that was in  place, I aspirated 100 mL immediately to stabilize the patient's  hemodynamics and that was hooked up to that container. We drained  approximately 1700 mL of dark red blood from the patient's pericardium. CT Surgery was called and notified. The patient did stabilize, did  require intermittent pressors, but ultimately did stabilize once the  pericardium was drained and evacuated. Given that we had lost  significant blood in the pericardium, I elected to start transfusion of  blood with 2 units of packed red blood cells. ICU was notified. The  patient was left intubated at that time. Again the progressive  pericardial effusion was managed immediately, however, the most pressing  concern was the LV failure that she had developed due to the acute  removal of the mitral regurgitation. She was stable, but there was  concern that she would develop continued LV failure. She will be  watched very carefully.   At this point, she was transfused the blood  that she had already lost and there was concern that she would continue  to ooze and bleed into the pericardium; however, we did not feel that  this was a large laceration as the pericardium was clearly improving and  the hemodynamics had stabilized without pressors. At that point, the  patient was then moved to the ICU in stable but critical condition. IMMEDIATE COMPLICATIONS:  Cardiac tamponade; cardiogenic shock. EBL: 2 L    MEDICATIONS:  See EMR. ACCESS:  See above. The right femoral venous sheath access site was  clipped with a modified figure-of-8 stitch with a slip knot. I did  obtain left femoral venous access with an 8.5-French sheath that was  left in place as well. SUMMARY:  MitraClip XTW x1 at A2P2; cardiac tamponade; cardiogenic shock  related to reduction in the mitral regurgitation. PLAN:  1. ICU care. 2.  Continue to monitor the drain. 3.  Drain q.8 hours. We will hook up to negative aspiration. 4.  Replace blood loss as needed. 5.  Repeat intubation. 6.  Monitor the patient's hemodynamics, there is concern that she is  going to develop progressive LV failure and she may need bridging with a  balloon pump or inotropes. 7.  Repeat all recent labs. 8.  ABG. 9.  Reverse all anticoagulation. 10.  We will repeat transthoracic echocardiogram in the a.m. All the above was explained to the patient's family. They are agreeable  and amenable to the plan.         William Daniels MD    D: 09/15/2022 5:54:38       T: 09/15/2022 13:33:49     ROSS/BELEN  Job#: [id number]     Doc#: [id number]    CC:

## 2022-09-16 NOTE — PROGRESS NOTES
[address]  OCCUPATIONAL THERAPY MISSED TREATMENT NOTE  [facility] CV[facility] 4B  4B-005-A      Date: 2022  Patient Name: Magdiel Elliott        CSN: [id number]   : 1940  (80 y.o.)  Gender: female                REASON FOR MISSED TREATMENT:  first attempt to eval patient with patient on 4D ICU with strict bedrest orders still in place. RN reports balloon pump has been removed and will work to discontinue bedrest orders. Upon second attempt to eval patient, discussed with PT and plan to hold due to noting a R groin woggle stitch, and want clarification on activity from cardiology. OT to hold and attempt back another date.

## 2022-09-16 NOTE — PROGRESS NOTES
[address] [address]  SPEECH THERAPY  [facility] CVICU 4B  Clinical Swallow Evaluation   & Treatment       SLP Individual Minutes  Time In: 4174  Time Out: 1100  Minutes: 20  Timed Code Treatment Minutes: 0 Minutes       Date: 2022  Patient Name: Bandar Bishop      CSN: [id number]   : 1940  (80 y.o.)  Gender: female   Referring Physician:  SHANNON Vivar CNP    Diagnosis: Mitral leaflet abnormality    History of Present Illness/Injury: Per chart review; Bandar Bishop  is a 81 y/o female who was admitted to [facility] with the above medical dx. \"Patient is 80year old female with PMHx of Severe Mitral Regurgitation, Pulmonary HTN, Multivessel Disease, DM2, HTN, Idiopathic Cardiomyopathy, HFrEF, Hypothyroidism. Patient presented to [facility] on  for MitraClip procedure due to severe mitral regurgitation noted on previous diagnostic cath in 2022. During procedure, massive hemorrhagic bleed noted that led to Cardiac Tamponade. At that time, the patient became hypotensive (SBP in 40's), Tachycardic ('s), and respiratory status decreased. Patient presented to ICU at this time, patient was in need of massive transfusion. Levo and Epi started pericardial drain was placed and atrium placed at -40, later adjusted to -10 with better results. Patient noted to have left ventricular dysfunction with low EF. Patient to return back to Cath lab for planned transition to intra-aortic balloon pump to augment support. \"    The pt was referred for a CSE d/t concerns for dysphagia. PT made NPO until CSE could be completed. *ST recommended cognitive evaluation s/p CSE d/t cognitive concerns and off handed remarks made throughout CSE.    Intubation: 22  Extubated: 9/15/22  Past Medical History:   Diagnosis Date    WEI (acute kidney injury) ([facility] [address] 2022    Aortic stenosis, severe 2022    Diabetes mellitus ([facility] [address]     Dilated cardiomyopathy ([facility] [address] 2022    Hypertension Hypothyroidism 1968    Mild mitral stenosis 05/26/2022    Mild-Severe tricuspid valve regurgitation 05/26/2022    Severe pulmonary hypertension ([facility] [address] 05/26/2022       SUBJECTIVE:  Patient seen with RN permission this date. Upon initial arrival, CSE was delayed d/t pt transferring from 4D to 4B    OBJECTIVE:    Pain:  No pain reported. Current Diet: NPO until CSE     Respiratory Status:  Nasal Canula and 2 L/min upon arrival. RN increased prior to CSE     Behavioral Observation:  Alert and Confused    CRANIAL NERVE ASSESSMENT   CN V (Trigeminal) Closes and Opens Mandible WFL    Rotary Jaw Movement WFL      CN VII (Facial) Cheeks Hold Food out of Sulci WFL    Opens, Closes/Seals, Protrudes, Retracts Lips Impaired: Bilateral    General Appearance WFL    Sensation Not Tested      CN X (Vagus - Pharyngeal) Raises Back of Tongue Not Tested      CN XI (Accessory) Lifts Soft Palate WFL      CN XII (Hypoglossal) Elevates Tongue Up and Back Not Tested    Protrusion   WFL    Lateralizes Tongue WFL    Sensation Not Tested      Other Observations Dentition Natural dentition, missing teeth     Vocal Quality Mild dysphonia, suspect d/t recent intubation     Cough DNT      Patient Evaluated Using: Ice Chips, Thin Liquids, and Puree    Oral Phase:  Impaired:  Loss of Bolus from Lips and Impaired AP Movement    Pharyngeal Phase: Impaired: Audible Swallow, Suspected Pharyngeal Residue, Suspected Decreased Airway Protection, and Multiple swallows     Signs and Symptoms of Laryngeal Penetration/Aspiration: No signs/symptoms of aspiration evident in this evaluation, but cannot rule out silent aspiration. Impressions: Patient presents with mild oral dysphagia / suspected pharyngeal dysphagia with inability to fully discern potential presence of pharyngeal phase deficits without formal instrumentation.  Patient presented with mild confusion during CSE, with need for repetition and clarification for all command following and understanding of evaluation. Notable mild inconsistent anterior loss of liquids, likely d/t poor labial seal and decreased labial rounding for procurement via spoon presentation. Patient presented with consistent audible swallows, consistent 2-3 swallows per bolus despite the drink/bite size. Patient denied awareness of audible swallows and the need for multiple swallows, along with denied any hx of dysphagia. Given complex medical course, intubation, current mentation, increased O2 saturations prior to PO intake, and aforementioned oropharyngeal signs and symptoms, st recommends patient remain NPO until MBS is able to be completed. RECOMMENDATIONS/ASSESSMENT:  Instrumental Evaluation: Modified Barium Swallow (MBS)  Diet Recommendations:  NPO x medications crushed in puree   Strategies:  Recommend NPO   Rehabilitation Potential: good  Discharge Recommendations: Inpatient Rehabilitation    EDUCATION:  Learner: Patient  Education:  Reviewed results and recommendations of this evaluation, Reviewed diet and strategies, Reviewed ST goals and Plan of Care, and Reviewed recommendations for follow-up  Evaluation of Education: Verbalizes understanding and Needs further instruction    PLAN:  Speech Therapy evaluation to assess speech, language, cognition and/or voice, Recommendations pending MBS, and Skilled SLP intervention on acute care 3-5 x per week or until goals met and/or pt plateaus in function. Specific interventions for next session may include: limited PO trials. PATIENT GOAL:    Did not state. Will further assess during treatment. SHORT TERM GOALS:  Short-term Goals  Timeframe for Short-term Goals: 2 weeks  Goal 1: Patient will complete conservative PO trials of thin liquids and puree with ST only with no overt s/s of airway invasion to assist in determining readiness for a PO diet.   INTERVENTION: Completed education re: current swallow function, diet recommendations, and rationale for NPO x medication recommendations. The pt verbalized understanding, however, proceeded to ask several questions indicating decreased comprehension. In order to improve understanding and agreement for recommendations, ST complete additional PO trials of ice chips, tsp, and puree solids with direct verbal cues following each continued trial. Following the first 2-3 trials, the pt was still unable to improve insight and awareness of multiple swallows. Completed education re: recommendations for NPO x meds crushed in puree, until MBS is completed. Education re: MBS procedure provided and verbal receptiveness acknowledged. Pt encouraged to be STRICT upright, alert, and reduced distractions for all PO intake of medications. Goal 2: Patient will complete MBS to further assess prior to diet initation    LONG TERM GOALS:  No LTM Goals recommended, due to anticipated short ELOS. Ty Ayala MA., [address] [place] [address] / #.[phone]

## 2022-09-16 NOTE — CARE COORDINATION
9/16/22, 2:47 PM EDT    DISCHARGE ON [address] day: 2  Location: -05/005-A Reason for admit: Mitral leaflet abnormality [Q23.9]  Cardiac tamponade [I31.4]   Procedure:   9/14   MitraClip XTW x 1  Attempted placement of second clip, however, clip was against interatrial septum and she developed acute cardiac tamponade  Immediate pericardiocentesis s/p 1700 cc removed  9/14 IABP placed  9/15 IABP removed  9/15 Extubated  Barriers to Discharge: POD 2. On 2L NC. Bedrest. PT/OT/SLP consulted. Drain to be removed. Per critical care note, transfer to  under there care, will not be transferred out of there care over weekend. PCP: Lainey Roman MD  Readmission Risk Score: 18.9%  Patient Goals/Plan/Treatment Preferences: Follow for needs post PT/OT evals (once bedrest lifted).

## 2022-09-16 NOTE — PROGRESS NOTES
CRITICAL CARE PROGRESS NOTE      Patient:  Amanda Olivier    Unit/Bed:4D-15/015-A  YOB: 1940  MRN: [id number]   PCP: Lainey Roman MD  Date of Admission: 9/14/2022  Chief Complaint:- Cardiac Tamponade    Assessment and Plan:    Cardiac Tamponade: Patient undergoing MitraClip procedure 9/14, massive bleed and developed cardiac tamponade. Pericardial drain was placed, will flush daily. Patient noted to have left ventricular dysfunction with low EF. Intra-aortic balloon pump to augment support placed (9/14), removed (9/15) successfully. Pericardial drain very low output overnight, will turn off suction and to gravity bag at noon today. Acute Hypoxic Respiratory Failure: Currently 2L NC. Patient's respiratory status quickly declined post MitraClip procedure secondary to hypovolemic shock from hemorrhage. Was intubated and has since been extubated on 9/15 and tolerated very well. Will wean NC as tolerated. Hypovolemic Shock: Hemorrhagic in nature due to massive bleed during Mitraclip procedure developing into cardiac tamponade. At that time, patient became hypotensive, respiratory status declined, and required massive transfusion (PRBC-11 units, FFP-4 units, Platelets-1 unit). LA now normal. No pressor requirement at this time and HR has stabilized. Severe Mitral Regurgitation,  Pulmonary HTN,  Multivessel Disease: Patient had undergone cardiac cath on 6/9/2022. Significant multivessel disease was noted with 100% RCA. 100% left circumflex with large OM1.  60 to 70% in the mid LAD and 90% blockage in the first diagonal. She was taken to the Cath Lab on 8/17/2022 per Dr. Joaquín Brand and a T stent was placed linking the LAD to the diagonal, also noted to have severe symptomatic mitral regurgitation and severe pulmonary HTN. Successful LAD diagonal bifurcation PCI, RCA , and Left Circumflex  done at that time. MitraClip done (9/14) with Dr. Joaquín Brand. Cardiology is primary and following. additional unit of PRBC's to help with CVP. Plan to remove IABP today and possible extubation after procedure. Will leave pericardial drain in until patient has < 50 cc/24 hrs.     :  Patient extubated last evening successfully without issues. Potassium and magnesium replaced this AM. Pericardial drain had minimal drainage overnight, per cardiology will switch pericardial drain from suction to gravity at noon. Patient no longer intubated and without pressors, will plan to transfer to  under our care, will not transfer out of our care over the weekend. Past Medical History:  As per HPI. Family History: Mother , HTN, DM, Heart Disease. Father , Lung Cancer. Social History:  Lifelong non-smoker, no alcohol use, no illicit drug use. ROS   General: No fevers or chills  Head: Denies headache  Ears: States always has tinnitus   Pulmonary: Current 2L NC, denies unusual shortness of breath  Cardiac: Denies chest pain or pressure  GI: Denies abdominal pains, diarrhea, or constipation  Musculoskeletal: No edema noted    Scheduled Meds:   insulin lispro  0-16 Units SubCUTAneous TID WC    insulin lispro  0-4 Units SubCUTAneous Nightly    sodium chloride flush  5-40 mL IntraVENous 2 times per day    [Held by provider] heparin (porcine)  5,000 Units SubCUTAneous BID    atorvastatin  10 mg Oral Daily    levothyroxine  100 mcg Oral Daily    calcium replacement protocol   Other RX Placeholder     Continuous Infusions:   sodium chloride      sodium chloride      sodium chloride      norepinephrine Stopped (09/15/22 0754)    midazolam 4 mg/hr (09/15/22 1619)    milrinone      [Held by provider] sodium bicarbonate infusion Stopped (09/15/22 0348)    sodium chloride      nitroGLYCERIN Stopped (09/15/22 1324)    dextrose         PHYSICAL EXAMINATION:  T:  99.5 (37.5). P: 85. RR: 18. B/P: 140/70. FiO2: 2L NC. O2 Sat: 98%. I/O:  past 24hrs -1267  Body mass index is 20.28 kg/m².    GCS: 15  General:

## 2022-09-16 NOTE — PLAN OF CARE
Problem: Chronic Conditions and Co-morbidities  Goal: Patient's chronic conditions and co-morbidity symptoms are monitored and maintained or improved  Outcome: Progressing     Problem: Discharge Planning  Goal: Discharge to home or other facility with appropriate resources  Outcome: Progressing     Problem: Pain  Goal: Verbalizes/displays adequate comfort level or baseline comfort level  Outcome: Progressing     Problem: Respiratory - Adult  Goal: Will be able to breathe spontaneously, without ventilator support  Description: Will be able to breathe spontaneously, without ventilator support  Outcome: Progressing     Problem: ABCDS Injury Assessment  Goal: Absence of physical injury  Outcome: Progressing  Flowsheets (Taken 9/16/2022 0801)  Absence of Physical Injury: Implement safety measures based on patient assessment     Problem: Safety - Adult  Goal: Free from fall injury  Outcome: Progressing  Flowsheets (Taken 9/16/2022 0801)  Free From Fall Injury: Instruct family/caregiver on patient safety     Problem: Skin/Tissue Integrity  Goal: Absence of new skin breakdown  Description: 1. Monitor for areas of redness and/or skin breakdown  2. Assess vascular access sites hourly  3. Every 4-6 hours minimum:  Change oxygen saturation probe site  4. Every 4-6 hours:  If on nasal continuous positive airway pressure, respiratory therapy assess nares and determine need for appliance change or resting period.   Outcome: Progressing     Problem: Nutrition Deficit:  Goal: Optimize nutritional status  Outcome: Progressing

## 2022-09-17 PROBLEM — G93.40 ACUTE ENCEPHALOPATHY: Status: ACTIVE | Noted: 2022-01-01

## 2022-09-17 PROBLEM — R58 RETROPERITONEAL BLEED: Status: ACTIVE | Noted: 2022-01-01

## 2022-09-17 PROBLEM — J96.01 ACUTE RESPIRATORY FAILURE WITH HYPOXIA (HCC): Status: ACTIVE | Noted: 2022-01-01

## 2022-09-17 NOTE — PROCEDURES
Arterial Line Insertion    Indication:  Need for hemodynamic monitoring    Access: Right Radial Artery    Location: ICU    After informed consent, RRA was prepped in sterile fashion. Lidocaine 2% SQ was given and standard micropuncture access using modified Seldinger technique was obtained. 0.018 guidewire was introduced and then a 4Fr radial micropuncture sheath was inserted. Sheath flushed and line zeroed. Complications:  None. Summary:   Right radial artery line insertion.       Tesfaye Arzate MD  Interventional Cardiology

## 2022-09-17 NOTE — PROGRESS NOTES
[facility]  SPEECH THERAPY  [facility] CVICU 4B  Modified Barium Swallow    SLP Individual Minutes  Time In: 4116  Time Out: 2483  Minutes: 10  Timed Code Treatment Minutes: 0 Minutes       Date: 2022  Patient Name: Loni Castaneda      CSN: [id number]   : 1940  (80 y.o.)  Gender: female   Referring Physician:  Bryant Cardona MD  Diagnosis: Mitral leaflet abnormality   Precautions: Fall risk, aspiration precautions   History of Present Illness/Injury: Patient admitted to [facility] with above med dx; please refer to physician H&P for full details. Per chart review, patient with complicated medical course s/t MitraClip procedure to result in massive hemorrhagic bleed to result in Cardiac Tamponade. Intubation required on 22, extubation completed 9/15/22. Clinical swallow evaluation completed with recommendations for f/u formal instrumentation via MBS to further evaluate pharyngeal integrity and to optimize dysphagia management POC development. Patient has a past medical history of WEI (acute kidney injury) ([address] Aortic stenosis, severe, Diabetes mellitus ([address] Dilated cardiomyopathy ([address] Hypertension, Hypothyroidism, Mild mitral stenosis, Mild-Severe tricuspid valve regurgitation, and Severe pulmonary hypertension ([address] Current Diet: NPO    Pain: No pain reported. SUBJECTIVE:  Patient with arrival to fluoro suite awake and alert, bed positioning. Confusion intermittently exhibited with patient exhibiting decreased comprehension and retention. OBJECTIVE:    Respiratory Status:  Nasal Canula; 1 LPM    Behavioral Observation:  Alert and Confused    PATIENT WAS EVALUATED USING:  Barium: Thin Liquids, Puree, Soft Solids, Coarse Solids, and Mixed Consistency    ORAL PHASE JEFFERY SCORE: (Dysphagia outcome and severity scale)  5 = Mild Dysphagia - May have one diet consistency restricted - Mild oral residue but clears    PHARYNGEAL PHASE JEFFERY SCORE: (Dysphagia outcome and severity scale)  5 = Mild Dysphagia - may need one consistency restricted - May have one or more of the following: Aspiration with thin - cough to clear, Airway penetration midway to the vocal cords with one or more consistency or to the vocal folds with one consistency, but clears spontaneously - Residue in the pharynx clears spontaneously    EVIDENCE FOR LARYNGEAL PENETRATION AND/OR ASPIRATION:  No evidence of laryngeal penetration  No evidence of aspiration    PENETRATION-ASPIRATION SCALE (PAS): Thin Liquids: 1 = Material does not enter the airway  Puree:  1 = Material does not enter the airway  Soft Solid:  1 = Material does not enter the airway  Mixed Consistencies: 1 = Material does not enter the airway  Hard Solid: 1 = Material does not enter the airway    ESOPHAGEAL PHASE:   No significant findings    ATTEMPTED TECHNIQUES:  Small Bolus Size Effective    Straw Effective    Cup Effective    Large Drinks Effective    Consecutive Drinks Effective    Chin Tuck Not Attempted    Head Turn Not Attempted    Spoon Presentations Effective    Volitional Cough Not Attempted    Spontaneous Cough Not Attempted           DIAGNOSTIC IMPRESSIONS:  Patient presents with mild oropharyngeal dysphagia as evidenced by clinical findings outlined above; current level of dysphagia severity at date likely compounded d/t altered mentation presenting. Oral phase slightly prolonged, however, efficacious for achievement of textural breakdown and cohesive bolus formation. Adequate control/containment of viscous bolus with no appreciable premature pharyngeal entry. Swallow onset timely, min deficits for TBR, hyolaryngeal elevation, and pharyngeal shortening to result in presence of min-mod residuals in the valleculae+pyriform regions post swallow completion; stasis sufficiently cleared with cued repeat swallow. Throughout entirety of controlled study, imaging did not endorse laryngeal penetration or tracheal aspiration events. Recommendations to initiate a soft and bite size diet with thin liquids, ongoing dysphagia services to be rendered to assist with potential achievement of baseline swallow function status/diet level in this hospital course. Diet Recommendations:  Soft and bite size, thin liquids  Strategies:  Full Upright Position, Small Bite/Sip, Multiple Swallow, Pulmonary Monitoring, Oral Care after all Meals, Intermittent Supervision, Medications Whole with Puree, Alternate Solids and Liquids, Limit Distractions, and Monitor for Fatigue   Rehabilitation Potential: good  Discharge Recommendations: Continue to Assess Pending Progress    EDUCATION:  Learner: Patient  Education:  Reviewed results and recommendations of this evaluation, Reviewed diet and strategies, Reviewed signs, symptoms and risks of aspiration, Reviewed ST goals and Plan of Care, and Reviewed recommendations for follow-up  Evaluation of Education: Demonstrates with assistance, Needs further instruction, and Family not present    PLAN:  Speech Therapy evaluation to assess speech, language, cognition and/or voice  Skilled SLP intervention on acute care 3-5 x per week or until goals met and/or pt plateaus in function. Specific interventions for next session may include: dysphagia management. PATIENT GOAL:    Did not state. Will further assess during treatment. SHORT TERM GOALS:  Short-term Goals  Timeframe for Short-term Goals: 2 weeks  Goal 1: Patient will safely consume soft and bite size diet, thin liquids (advanced texture trials as clinically indicated) without overt s/s aspiration and with direct implementation of identified compensatory strategies to assist with nutrition/hydration measures. Goal 2: Complete full cognitive linguistic evaluation s/t ongoing confusion to develop alternate goals within established POC.     LONG TERM GOALS:  No LTG established given short BREEZY Gomez M.A., [address] [facility]

## 2022-09-17 NOTE — PROGRESS NOTES
CRITICAL CARE PROGRESS NOTE      Patient:  Shi Casillas    Unit/Bed:4B-05/005-A  YOB: 1940  MRN: [id number]   PCP: Luis Waters MD  Date of Admission: 9/14/2022  Chief Complaint:- Cardiac Tamponade    Assessment and Plan:    Cardiac Tamponade: Patient undergoing MitraClip procedure 9/14, massive bleed and developed cardiac tamponade. Pericardial drain was placed. Patient noted to have left ventricular dysfunction with low EF. Intra-aortic balloon pump to augment support placed (9/14), removed (9/15) successfully. Pericardial drain switched off suction to gravity bag (9/16). Will continue to flush drain. Minimal output overnight. Cardiology following. Acute Hypoxic Respiratory Failure: Remains on 2L NC. Patient's respiratory status quickly declined post MitraClip procedure secondary to hypovolemic shock from hemorrhage. Was intubated and has since been extubated on 9/15. Maintain O2 sats > 90%. Will continue to wean NC as tolerated. Hypovolemic Shock: Hemorrhagic in nature due to massive bleed during Mitraclip procedure. At that time, patient became hypotensive, respiratory status declined, and required massive transfusion (PRBC-11 units, FFP-4 units, Platelets-1 unit). LA was elevated, now normal. No pressor requirement at this time and HR has stabilized. New Onset Afib with RVR: Possible initiating factor being cardiac procedure. Patient noted to go into Afib with RVR on tele, EKG confirmed. Cardiology notified. 1 unit PRBC's to be given and ECHO pending. Became hypotensive, Noe on stand by. Severe Mitral Regurgitation,  Pulmonary HTN,  Multivessel Disease: Patient had undergone cardiac cath on 6/9/2022. Significant multivessel disease was noted with 100% RCA.   100% left circumflex with large OM1.  60 to 70% in the mid LAD and 90% blockage in the first diagonal. She was taken to the Cath Lab on 8/17/2022 per Dr. Nery Tavera and a T stent was placed linking the LAD to the diagonal. Successful LAD diagonal bifurcation PCI, RCA , and Left Circumflex  done at that time. Noted to have severe symptomatic mitral regurgitation and severe pulmonary HTN. MitraClip done (9/14) with Dr. Nery Tavera. Labile Blood Pressure: Remaining stable. Patient initially hypotensive and requiring pressor support, had become hypertensive and needing Nitroglycerin. Has since been weaned off both. Maintain MAP > 65. WEI on CKD Stage 3b: Improving. No formal diagnosis of CKD, per result review eGFR has remained in range for Stage 3b over last several months. Creatinine 1.5 on arrival, improving to 1.2. Toribio catheter removed (9/16), continues to have good urine output. Will monitor labs and strict I/O's. Electrolyte Disturbances: Hypokalemia, maintain > 4.0. Hypomagnesemia, maintain > 2.0. Hypocalcemia, trend ionized calcium. Will replace as needed. Non-Insulin Dependent DM2: A1c from 8/17/22 7.0, controlled for her age. Patient on no medication per chart review. SSI started as glucose numbers have been elevated. Will monitor with accu-checks. Idiopathic Cardiomyopathy and HFrEF: Transesophageal ECHO on 6/23/22 demonstrated severe global hypokinesis. EF 30-35%, Left atrium size severely dilated with no thrombus. Hypothyroidism: TSH normal in 5/22. On home synthroid. INITIAL H AND P AND ICU COURSE:  Patient is 80year old female with PMHx of Severe Mitral Regurgitation, Pulmonary HTN, Multivessel Disease, DM2, HTN, Idiopathic Cardiomyopathy, HFrEF, Hypothyroidism. Patient presented to [facility] on 9/14 for MitraClip procedure due to severe mitral regurgitation noted on previous diagnostic cath in August 2022. During procedure, massive hemorrhagic bleed noted that led to Cardiac Tamponade. At that time, the patient became hypotensive (SBP in 40's), Tachycardic ('s), and respiratory status decreased. Patient presented to ICU at this time, patient was in need of massive transfusion.  Levo and Epi started pericardial drain was placed and atrium placed at -40, later adjusted to -10 with better results. Patient noted to have left ventricular dysfunction with low EF. Patient to return back to Cath lab for planned transition to intra-aortic balloon pump to augment support. 9/15:  Patient is off pressor support and became hypertensive overnight. Started on Nitroglycerin, has since been stopped. Discussed case with Dr. Matthew Hernandez, will plan to give Albumin and additional unit of PRBC's to help with CVP. Plan to remove IABP today and possible extubation after procedure. Will leave pericardial drain in until patient has < 50 cc/24 hrs.     :  Patient extubated last evening successfully without issues. Potassium and magnesium replaced this AM. Pericardial drain had minimal drainage overnight, per cardiology will switch pericardial drain from suction to gravity at noon. Patient no longer intubated and without pressors, will plan to transfer to  under our care, will not transfer out of our care over the weekend. :  Patient afebrile overnight and stable vital signs noted. Pericardial drain with gravity bag in place, no noted output overnight. Minimal drainage in bag this AM. Toribio catheter removed over the evening and patient able to get up and use restroom with good urine output. WEI has resolved. Dr. Matthew Hernandez following. Patient to remain under our service over the weekend, not to be transferred. Later in the morning, patient went into Afib with RVR. No hx of Afib. Patient was having no symptoms. Became hypotensive. EKG confirmed Afib with RVR with incomplete left bundle branch block. Dr. Matthew Hernandez also contacted, wants 1 unit PRBC's given and ECHO to be done. Neosynephrine ordered for hypotension. See significant event note. Past Medical History:  As per HPI. Family History: Mother , HTN, DM, Heart Disease. Father , Lung Cancer.   Social History:  Lifelong non-smoker, no alcohol use, no illicit drug use. ROS   General: No fevers or chills  Head: Denies headache  Ears: States always has tinnitus   Pulmonary: Current 2L NC, denies unusual shortness of breath  Cardiac: Denies chest pain or pressure  GI: Denies abdominal pains, diarrhea, or constipation  Musculoskeletal: No edema noted    Scheduled Meds:   insulin lispro  0-16 Units SubCUTAneous TID WC    insulin lispro  0-4 Units SubCUTAneous Nightly    sodium chloride flush  5-40 mL IntraVENous 2 times per day    [Held by provider] heparin (porcine)  5,000 Units SubCUTAneous BID    atorvastatin  10 mg Oral Daily    levothyroxine  100 mcg Oral Daily    calcium replacement protocol   Other RX Placeholder     Continuous Infusions:   sodium chloride 75 mL/hr (09/16/22 2020)    sodium chloride      sodium chloride      sodium chloride      norepinephrine Stopped (09/15/22 0754)    midazolam 4 mg/hr (09/15/22 1619)    milrinone      [Held by provider] sodium bicarbonate infusion Stopped (09/15/22 0348)    sodium chloride      nitroGLYCERIN Stopped (09/15/22 1324)    dextrose         PHYSICAL EXAMINATION:  T:  98.6 (37). P: 84. RR: 19. B/P: 135/70. O2 Sat: 2L NC, 98%. I/O:  past 24hrs -706  Body mass index is 20.28 kg/m². GCS: 15  General:   Pleasant elderly female, resting comfortably in bed. HEENT:  normocephalic and atraumatic. No scleral icterus. PERR  Neck: supple. No Thyromegaly. Lungs: clear to auscultation. No retractions  Cardiac: RRR. No JVD. Pericardial drain with gravity bag in place. Abdomen: soft. Nontender. Extremities:  No clubbing, cyanosis, or edema x 4. Vasculature: capillary refill < 3 seconds. Palpable dorsalis pedis pulses. Skin:  warm and dry. Psych:  Alert and oriented x3, Appropriate affect  Lymph:  No supraclavicular adenopathy. Neurologic:  No focal deficit. No seizures. Data: (All radiographs, tracings, PFTs, and imaging are personally viewed and interpreted unless otherwise noted).    Sodium 139 Potassium 3.7 Cl 103 CO2 25 BUN 29 Cr 1.2 AG 11.0 Ionized Ca 0.94 eGFR 43 LA 1.0 Glucose 138 ProBNP 29397 Mg 2.0  WBC 12.5  Hgb 9.5  Hct 29.5 MCV 92.5  Pericardial drain with gravity bad minimal drainage overnight  EKG demonstrated NSR with left axis deviation   Telemetry shows NSR    Seen with multidisciplinary ICU team.  Meets Continued ICU Level Care Criteria:    [x] Yes   [] No - Transfer Planned to listed location:  [] HOSPITALIST CONTACTED- DR     Case and plan discussed with Dr. Chacorta Harvey. Electronically signed by Parviz Brandon [address] [place] [address]     Patient seen and examined independently by me. Above discussed and I agree with Dr. Nonie Goodell, and the abovenote except where indicated in the EMR revision history. Also see my additional comments and changes indicated by discrete font, text color, italics, and/or initials. Labs, cultures, and radiographs where available were reviewed. Changes were made in the orders as necessary. I discussed patient concerns with Alvina'lidya MOTT and instructions were given. Respiratory care issues addressed. Please see our orders for the updated patient care plan.   Ccm:90  Electronically signed by     Tara Flanagan MD on 9/17/2022 at 6:28 PM

## 2022-09-17 NOTE — SIGNIFICANT EVENT
1135 : This RN informed patient of the order of a repeat echocardiogram. This RN attempted to assist patient back to bed from chair. As patient began to ambulate, patient became notable weak and unable to follow commands. This RN called for additional assistance from floor staff members, Nelli Castellanos RN and IlanaCoemmanuel Asher. After patient is returned to bed, patient still unable to follow commands and remains weak and unresponsive. A CODE BLUE is called due to patient unable to protect airway. 1137: Delfino Varela CNP, Hamzah Bowden MD and Nichole Bauer MD at bedside to assess patient. Upon arrival, patient presents with significant right eye deviation, left-sided facial droop with left-sided bilateral upper and lower extremity weakness. A CODE STROKE is called at this time. Patient is taken to CT Scan accompanied by CODE TEAM and primary RN for CT head w/o contrast, CTA Hhad and neck w/wo contrast. S/p CT Scan, patient is taken to MRI accompanied by this RN, Resource RN, Roberto and RRT. 1315: While in MRI Scanner, patient became notably bradycardic. This RN administered 1 ampule of Atropine via LAC PIV. Post administration, patient presented with complete heart block and was removed from MRI Room. ACLS protocol initiated at this time. 1318: Patient asystole on cardiac monitor. CPR initiated. 1 ampule of Epinephrine administered. 1320: Pulse check. Patient remains in asystole on cardiac monitor. CPR is resumed. 1 ampule of sodium bicarbonate administered. 1321: 1 ampule of Epinephrine administered. 1322: Pulse check. Heart rate 117, atrial fibrillation with rapid ventricular response. 1325: Manual blood pressure obtained by Delfino Varela CNP. Systolic blood pressure 755.     1327. Patient presents with V-Tach on cardiac monitor. Shocked with 200 joules. 1335: Additional PIV access obtained by Elver Mcdonough RN. 20G in right forearm.      1338: EKG Tech arrives to MRI Department. EKG in progress. 1332: Calcium administered. 1335: Patient presents with V-Tach on cardiac monitor. Shocked with additional 200 joules. 0.5 ampule of Epinephrine administered. 1336: 1 ampule of sodium bicarbonate and calcium administered. Lidocaine administered. 1337: Pulse checked. Heart rhythm atrial fibrillation with wide complex. 1338: Patient prateek-down on cardiac monitor. 0.5 of atropine and 1 ampule of Epinephrine given. 1340: Sodium bicarbonate administered. Blood pressure 139/91.     1346: Patient prateek-down to asystole on cardiac monitor. CPR initiated. 1 ampule of Epinephrine administered. 1347: 1 ampule of sodium bicarbonate administered. 1348: Pulse checked via ultrasound. No pulse. CPR resumed. 1349: 1 ampule of Epinephrine administered. 1350: Epinephrine drip initiated at 5mcg. Pulse check. Patient remains asystole on cardiac monitor. CPR resumed. 1351: 1 ampule of Epinephrine administered. 1352: Pulse checked. Patient remains asystole on cardiac monitor. CPR resumed. 1354: TPA 50mg administered. 1355: 1 ampule of Epinephrine given. 1356: 1 ampule of Epinephrine given. Triple lumen central venous catheter placed in left subclavian by Elena Vasquez CNP. 1357: Pulse check. Patient remains asystole on monitor. CPR resumed. 1358: Pulse check. Pulse restored. 171 bpm, atrial fibrillation. Blood pressure 126/73.     1406: Heparin drip started at 12 mcg.     1420: Patient transported to CT Scan for CTA Chest w/ w/o contrast.     1430: Patient transported to [facility] Patient placed on bedside monitor.  Report given to Cuate Carr.

## 2022-09-17 NOTE — SIGNIFICANT EVENT
Notified patient was in Afib with RVR (no prior hx), assessed patient and she was A/O x4 with no symptoms. Contacted Dr. Han Ann and 1 unit PRBC's and ECHO ordered at that time. Approximately 20-30 minutes later code blue called. Arrived to patient room and was notified patient had stood up from chair and went to get back into bed and became very weak and unstable and hypotensive. Collapsed onto bed and became unresponsive. Initial presentation notable left sided facial droop with bilateral eye gaze right and bilateral upper and lower extremity weakness. Code ok called at this time. Patient's respiratory status declined and was intubated and bag mask used. Pressor support initiated for hypotension. Taken to CT for CT head wo contrast, CTA head and neck w/wo contrast. No emboli or thrombi noted. Taken to MRI. While down to get MRI, patient notably became bradycardic and then complete heart block noted/asystole. Appropriate measure were taking per ACLS protocol and CPR was initiated. During this time, patient's pulse was found and then would become asystole multiple times. ECHO was at bedside and demonstrated PE in right atrium. Discussion was had with intensivist, cardiology, and neurology as to what best plan of action would be. In agreement that administration of TPA was appropriate. After given, repeat ECHO demonstrated PE had dissipated. Taken to receive CTA chest and small filling defects consistent with small PE in the right lower lobe pulmonary arterial branches. Transferred to ICU. Dr. Han Ann did transesophageal echo in room and did not see any PE still present. Patient placed on ventilation. Versed and Fentanyl started. Pressors on board. Heparin started. Family contacted. Discussed plans with Cardiology and Neurology.

## 2022-09-17 NOTE — PROGRESS NOTES
1440- Patient arrives from MRI to 4D15. Placed on bedside monitor. Wide complex tachycardia noted. Patient currently on 5 mcg/min of Levophed and 300 mcg/min of Neosynephrine and 5 mcg/min Epinephrine. 1510- STAT EKG placed. 1514- 2 mg of Versed given per Isaak Murray, APRN    1515- 2 mg of Versed given per Isaak Murray, APRN  . 1520- Patient cardioverted at 360 J. HR remains wide complex. Rate 107.    1524- 150 mg Amio bolus given per verbal order by Dr. Kwasi Katz. 1557- 2 amps of bicarb given . 1600- Epinephrine turned off per verbal order by Dr. Kwasi Katz. Phenylephrine decreased to 250 mcg/min by SHANNON Jolly. 1605- Verbal orders received from Dr. Kwasi Katz to give 2 mg of Bumex IVP once. 1003- Dr. Neyda rene at bedside. Orders received to administer 4 mg of Versed IVP and start Versed/Fentanyl gtt. 1830- Orders received from SHANNON Jolly to place robledo catheter.

## 2022-09-17 NOTE — PROGRESS NOTES
Interventional Cardiology Progress Note    Seen by myself, primary RN, and ICU  She was completely awake, talking, no deficits, and wanting to go home  Code Blue called acutely about 2 hours after I had seen her  Drain checked, no drainage  Patient with right sided gaze deviation and left facial droop  Code Stroke called  Patient had gone into Afib after my assessment this AM  STAT CTA/CT head - no bleed, no LVO  D/w Dr. Pedro Motley - no indication for intervention  Will start Heparin gtt  She was moving all extremities in the CT scanner  Prior to starting Heparin, will check MRI of head to ensure no large area of involvement in possible CVA/TIA     On the way to the MRI scanner, bradycardia/PEA arrest   ACLS done, intermittent VF as well  TTE done by myself and Edwina  Initial TTE shows normal RV size and function and intact clip and LVEF 20%   While we were assessing the patient between pulse checks, I did another TTE, showing large free floating thrombus in the RA, and then embolization into the PA, also severe acute RV dilation, and almost RV standstill (see images). ECG was showing RBBB  Findings are consistent with hemodynamically unstable PE  After discussion with team, given she was arresting due to the PE, decision was made to give IV tPA  CVC placed during code prior to the IV tPA  IV tPA given, and she recovered her rhythm, pulse, and no further CPR was needed. Heparin gtt started  RRA placed at bedside in ICU  BANDAR done while patient in Afib RVR - DCCV  x 360J x 1 restored NSR, no [place] thrombus. Findings are most consistent with large DVT that embolized across the interatrial septum for MCA CVA and resulted in PE sequentially.       Transferred to the ICU in stable condition  I spoke to the family and updated them    Deb Sharp MD  Interventional Cardiology

## 2022-09-17 NOTE — PROGRESS NOTES
[address] 60  INPATIENT OCCUPATIONAL THERAPY  [facility] CVICU 4B  EVALUATION    Time:   Time In: 529  Time Out: 1024  Timed Code Treatment Minutes: 14 Minutes  Minutes: 22          Date: 2022  Patient Name: Magdiel Elliott,   Gender: female      MRN: [id number]  : 1940  (80 y.o.)  Referring Practitioner: Osorio Nelson MD  Diagnosis: Mitral Leaflet abnormality  Additional Pertinent Hx: Patient presented to [facility] on  for MitraClip procedure due to severe mitral regurgitation noted on previous diagnostic cath in 2022. During procedure, massive hemorrhagic bleed noted that led to Cardiac Tamponade. At that time, the patient became hypotensive (SBP in 40's), Tachycardic ('s), and respiratory status decreased. Patient presented to ICU at this time, patient was in need of massive transfusion. Levo and Epi started pericardial drain was placed and atrium placed at -40, later adjusted to -10 with better results. Patient noted to have left ventricular dysfunction with low EF. Patient to return back to Cath lab for planned transition to intra-aortic balloon pump to augment support. Balloon pump removed and pt extubated 9/15. Restrictions/Precautions:  Restrictions/Precautions: Fall Risk  Position Activity Restriction  Other position/activity restrictions: R groin Woggle stitch- cardiology to clarify activity     Subjective  Chart Reviewed: Yes, Orders, Progress Notes, History and Physical  Patient assessed for rehabilitation services?: Yes    Subjective: RN okayed OT session. Upon arrival patient was resting in bed. Pt was agreeable to OT session.     Pain: 0/10: Pt denies     Vitals: Vitals not assessed per clinical judgement, see nursing flowsheet    Social/Functional History:  Lives With: Other (comment) (Female cousin)  Type of Home: Condo  Home Layout: One level  Home Access: Level entry  Home Equipment:  (None)   Bathroom Shower/Tub: Walk-in shower, Shower chair with back  Bathroom Toilet: Handicap height  Bathroom Equipment: Grab bars in shower  Bathroom Accessibility: Accessible       ADL Assistance: Independent  Homemaking Assistance: Independent  Ambulation Assistance: Independent  Transfer Assistance: Independent    Active : Yes  Mode of Transportation: Car  Occupation: Retired  Additional Comments: Pt fully I prior to admission    VISION:WFL    HEARING:  WFL    COGNITION: Slow Processing, Decreased Problem Solving, and Decreased Safety Awareness    RANGE OF MOTION:  Bilateral Upper Extremity:  WFL    STRENGTH:  Bilateral Upper Extremity:  Impaired - deconditioned     SENSATION:   Not tested     ADL:   No ADL's completed this session. Eze Haider BALANCE:  Sitting Balance:  Stand By Assistance. Seated EOB. Standing Balance: Contact Guard Assistance. 2 UE release     BED MOBILITY:  Supine to Sit: Minimal Assistance, with head of bed raised, with rail, with increased time for completion    Scooting: Stand By Assistance      TRANSFERS:  Sit to Stand:  [employer]. From EOB. Stand to Sit: Contact Guard Assistance. FUNCTIONAL MOBILITY:  Assistive Device: None-Hand held assist.   Assist Level:  Minimal Assistance. Distance:  EOB to recliner  Slow pace, recommending to use RW. Activity Tolerance:  Patient tolerance of  treatment: good. Assessment: This 80year old female presents with mitral leaflet abnormality. Pt demonstrates weakness, decreased balance, decrease safety awareness, decreased endurance. Pt requires skilled OT intervention to increase indep and safety with all self cares, transfers, mobility, and IADLs to return to PLOF. Without skilled OT intervention patient is at increased risk for falls, caregiver burden, and hospital readmission after discharge. Pt would benefit from OT at discharge.      Performance deficits / Impairments: Decreased functional mobility , Decreased ADL status, Decreased strength, Decreased endurance, Decreased safe awareness, Decreased high-level IADLs, Decreased balance  Prognosis: Good  REQUIRES OT FOLLOW-UP: Yes    Treatment Initiated: Treatment and education initiated within context of evaluation. Evaluation time included review of current medical information, gathering information related to past medical, social and functional history, completion of standardized testing, formal and informal observation of tasks, assessment of data and development of plan of care and goals. Treatment time included skilled education and facilitation of tasks to increase safety and independence with ADL's for improved functional independence and quality of life. Discharge Recommendations:  Continue to assess pending progress, Home with Home health OT, Patient would benefit from continued therapy after discharge    Patient Education:     Patient Education  Education Given To: Patient  Education Provided: Role of Therapy, Plan of Care, Precautions, ADL Adaptive Strategies, Transfer Training  Education Method: Demonstration  Barriers to Learning: None  Education Outcome: Continued education needed    Equipment Recommendations:  Equipment Needed: No    Plan:  Times per Week: 5x  Current Treatment Recommendations: Strengthening, Balance training, Functional mobility training, Endurance training, Equipment evaluation, education, & procurement, Self-Care / ADL, Safety education & training, Patient/Caregiver education & training, Home management training. See long-term goal time frame for expected duration of plan of care. If no long-term goals established, a short length of stay is anticipated. Goals:  Patient goals : Go Home  Short Term Goals  Time Frame for Short term goals: Until disxcharged  Short Term Goal 1: Pt will complete BUE strengthening exercises with min vcs for technique to increase indep and endurance with all self cares and transfers.   Short Term Goal 2: Pt will complete standing tolerance x 4 minutes with S and min vcs for safety to increase indep and endurance with sinkside grooming. Short Term Goal 3: Pt will complete functional mobility to/from BR and HH distances with S and 0vcs for safety to increase indep and endurance. Short Term Goal 4: Pt will complete LB dressing with LHAE PRN and min A to increase indep and endurance within home environment. Additional Goals?: No         Following session, patient left in safe position with all fall risk precautions in place.

## 2022-09-17 NOTE — PROCEDURES
ICU PROCEDURE - CVC PLACEMENT:    Risks and benefits to the procedure were discussed. Alternatives and their risks were discussed as well. INDICATION: Cardiac arrest    SITE: Left Subclavian Vein      CONSENT: was obtained after explaining indication/risks to patient and/or next of kin    TIME OUT: taken    STERILE PREP: Prior to the procedure all involved washed their hands. Full maximum sterile field/barrier technique was followed (with cap and mask, gloves and sterile gown, as well as broad field sterile drapes). Local disinfection was performed with broad field application of orange dyed chloraprep solution, which was allowed to dry fully prior to the initiation of the procedure. LOCAL ANESTHETIC: Aqueous lidocaine 1%     ESTIMATED BLOOD LOSS: Minimal    ULTRASOUND GUIDANCE USED: No    PROCEDURE:  Using modified seldinger technique, the appropriate vessel was located with the introducer needle subsequent to the use of a 22g x 1.5 inch \"\" needle. The flexible J-tip wire was passed without difficulty or resistance, followed by minimal skin incisions over the introducer needle. The introducer needle was withdrawn and discarded, maintaining manual control of the guidewire at all times. After dilation of the tract, a preflushed 3 lumen CVC catheter was advanced over the guidewire without difficulty or resistance to its full extent. The guidewire was withdrawn and discarded and good return of nonpulsatile, dark venous blood assured through all lumes, with easy flushing of the lumens. The line was sutured in place then the site was reprepped with a  chlorprep solution followed by a Biopatch device. After hemostasis was assured, an op site was applied and all sharps and materials were discarded appropriately. EBL < 5 ml. COMPLICATIONS: None    POST PROCEDURE CHEST XRAY HAS BEEN ORDERED    Electronically signed by     Sagar Velasquez.  SHANNON Song CNP on 9/17/2022 at 4:21 PM    Central line was placed prior to tPA administration in code blue.

## 2022-09-17 NOTE — PROGRESS NOTES
09/17/22 1452   Encounter Summary   Encounter Overview/Reason  Spiritual/Emotional Needs   Service Provided For: Family   Referral/Consult From: Multi-disciplinary team   Last Encounter  09/17/22   Complexity of Encounter High   Begin Time 1318   End Time  1415   Total Time Calculated 57 min   Crisis   Type Code Blue   Spiritual/Emotional needs   Type Spiritual Support;Emotional Distress   Assessment/Intervention/Outcome   Assessment Calm; Hopeful;Coping   Intervention Sustaining Presence/Ministry of presence;Prayer (assurance of)/[place]; Active listening   Outcome Expressed Gratitude    encountered family who was called to bedside by medical team as patient was a code blue. Clint Ramos (sister to patient) and Nagi Fitzpatrick (also sister to patient) discussed patient wishes about end of life care. Sascha Lazaro stated she is the [facility] and is also a nurse herself. Offered active listening, prayer for peace and comfort. Will continue to monitor for spiritual needs for patient and family.

## 2022-09-17 NOTE — PROCEDURES
Transesophageal Echocardiogram/DCCV    Indication:  Afib RVR    Informed Consent:  The indication, risks and benefits of the procedure and possible therapeutic consequences and alternatives were discussed with the patient. The patient was given the opportunity to ask questions and to have them answered to his/her satisfaction. Risks of the procedure include but are not limited to the following: Bleeding, infection, pain and discomfort, injury to esophagus, rhythm disturbance, low blood pressure, myocardial infarction, stroke, kidney damage/failure, allergic reactions to sedatives/contrast material, loss of pulse/vascular compromise requiring surgery, needing blood transfusion, requiring emergent open heart surgery or emergent coronary intervention, the need for intubation/respiratory support, the requirement for defibrillation/cardioversion, aspiration pneumonia, skin burns, malignant dysrhythmias and death. Alternatives to and omission of the suggested procedure were discussed. The patient had no further questions and wished to proceed; the consent form was signed. Sedation:  Fentanyl/Versed, See EMR for dosages    Airway: Biteblock    Procedure:  Under continuous hemodynamic and respiratory monitoring, BANDAR probe inserted without complication. BANDAR performed (please see full report). Probe withdrawn and the patient was neurologically, hemodynamically, and respiratory intact. No reversal agents given. Thereafter, pads were applied to the patients chest in the standard position. A biphasic, 360 J shock was delivered x 1. The patient converted to sinus rhythm. ECG performed. Post Procedure:  Orders placed, monitor x 4 hours    Summary:  Successful, uncomplicated BANDAR-DCCV with moderate sedation. Case and findings discussed with the family. They were agreeable an amenable to the plan.

## 2022-09-18 NOTE — CONSULTS
Neurology Consult Note    Date:9/18/2022       MUOV:[id number]  Patient Name:Alvina Holman     YOB: 1940     Age:82 y.o. Requesting Physician: Kaveh English MD     Reason for Consult:  Evaluate for stroke alert      Chief Complaint: stroke alert      Madelin Le is a 80 y.o. female with a history of aortic stenosis, diabetes mellitus, hypertension, hypothyroidism, and mitral regurgitation who is currently admitted following a MitraClip procedure. Yesterday, the patient was a stroke alert activation due to sudden onset neurologic changes. She had rightward gaze deviation and left-sided facial droop. She was taken emergently for a CT and CTA were both of which were negative for acute findings. Her symptoms spontaneously seem to resolve while in the CT scanner. She was taken to MRI and was about to undergo an MRI of her brain when she had a cardiac arrest.  She underwent multiple rounds of CPR and defibrillation and was found to have a massive pulmonary embolism which was imaged on bedside ultrasound. She was given tPA with improvement. Currently, she remains intubated but is not on any sedation. She is not requiring pressor support at this time. She is not moving spontaneously, but will withdraw her extremities to noxious stimuli. The patient went down to MRI earlier today and had an episode of seizure-like activity. She had 4 superior gaze deviation and tonic activity of the left upper extremity. Upon arriving back to the ICU, she had a second episode of similar movements. These lasted for 30 seconds each.     Review of Systems   Review of Systems   Unable to perform ROS: Intubated   Medications   Scheduled Meds:    pantoprazole  40 mg IntraVENous BID    insulin lispro  0-16 Units SubCUTAneous TID     insulin lispro  0-4 Units SubCUTAneous Nightly    sodium chloride flush  5-40 mL IntraVENous 2 times per day    atorvastatin  10 mg Oral Daily    levothyroxine 100 mcg Oral Daily    calcium replacement protocol   Other RX Placeholder     Continuous Infusions:    sodium chloride      sodium chloride      dexmedetomidine      heparin (PORCINE) Infusion 14 Units/kg/hr (09/18/22 0305)    bumetanide 0.1 mg/mL infusion 1 mg/hr (09/18/22 0553)    sodium chloride 75 mL/hr at 09/17/22 1052    dextrose       PRN Meds: sodium chloride, sodium chloride, heparin (porcine), heparin (porcine), sodium chloride flush, ondansetron **OR** ondansetron, polyethylene glycol, acetaminophen **OR** acetaminophen, fentanNYL, glucose, dextrose bolus **OR** dextrose bolus, glucagon (rDNA), dextrose  Medications Prior to Admission:   No current facility-administered medications on file prior to encounter. Current Outpatient Medications on File Prior to Encounter   Medication Sig Dispense Refill    sacubitril-valsartan (ENTRESTO) 24-26 MG per tablet Take 1 tablet by mouth 2 times daily (Patient taking differently: Take 0.5 tablets by mouth 2 times daily) 180 tablet 3    clopidogrel (PLAVIX) 75 MG tablet Take 1 tablet by mouth daily 90 tablet 3    metoprolol succinate (TOPROL XL) 25 MG extended release tablet Take 1 tablet by mouth daily 90 tablet 3    nitroGLYCERIN (NITROSTAT) 0.4 MG SL tablet up to max of 3 total doses. If no relief after 1 dose, call 911. 25 tablet 3    atorvastatin (LIPITOR) 10 MG tablet Take 1 tablet by mouth in the morning.  90 tablet 3    aspirin EC 81 MG EC tablet Take 1 tablet by mouth daily 30 tablet 3    levothyroxine (SYNTHROID) 100 MCG tablet Take 1 tablet by mouth Daily 30 tablet 3    meclizine (ANTIVERT) 25 MG CHEW Take 12.5 mg by mouth 3 times daily as needed (dizziness)      acetaminophen (TYLENOL) 325 mg tablet Take 650 mg by mouth every 6 hours as needed for Pain      Multiple Vitamins-Minerals (VISION VITAMINS PO) Take 1 tablet by mouth daily Vision Multi 50+       Past History    Past Medical History:   has a past medical history of WEI (acute kidney injury) [facility]), Aortic stenosis, severe, Diabetes mellitus ([facility] [address] Dilated cardiomyopathy ([facility] [address] Hypertension, Hypothyroidism, Mild mitral stenosis, Mild-Severe tricuspid valve regurgitation, and Severe pulmonary hypertension ([facility] [address] Social History:   reports that she has never smoked. She has been exposed to tobacco smoke. She has never used smokeless tobacco. She reports that she does not currently use alcohol. She reports that she does not use drugs. Family History:   Family History   Problem Relation Age of Onset    Hypertension Mother     Diabetes Mother     Heart Disease Mother     Lung Cancer Father     Heart Disease Sister     Colon Cancer Sister        Physical Examination      Vitals:  /75   Pulse 87   Temp 97.9 °F (36.6 °C) (Bladder)   Resp 12   Ht 5' 4\" (1.626 m)   Wt 124 lb 5.4 oz (56.4 kg)   SpO2 100%   BMI 21.34 kg/m²   Temp (24hrs), Av.8 °F (36.6 °C), Min:96.5 °F (35.8 °C), Max:98.4 °F (36.9 °C)      I/O (24Hr): Intake/Output Summary (Last 24 hours) at 2022 0753  Last data filed at 2022 0553  Gross per 24 hour   Intake 2538.9 ml   Output 370 ml   Net 2168.9 ml         Physical Exam  Vitals reviewed. Constitutional:       General: She is not in acute distress. Appearance: She is ill-appearing. HENT:      Head: Normocephalic and atraumatic. Right Ear: External ear normal.      Left Ear: External ear normal.      Nose: Nose normal.      Mouth/Throat:      Mouth: Mucous membranes are moist.      Pharynx: No posterior oropharyngeal erythema. Comments: Endotracheal and orogastric tubes in place. Cardiovascular:      Rate and Rhythm: Normal rate and regular rhythm. Heart sounds: Normal heart sounds. No murmur heard. Pulmonary:      Effort: Pulmonary effort is normal. No respiratory distress. Breath sounds: Normal breath sounds. No wheezing. Comments: Intubated and mechanically ventilated.   Abdominal:      General: Bowel sounds are normal. Palpations: Abdomen is soft. Tenderness: There is no abdominal tenderness. Musculoskeletal:         General: Normal range of motion. Right lower leg: No edema. Left lower leg: No edema. Skin:     General: Skin is warm. Findings: No rash. Psychiatric:      Comments: Unresponsive     Neurologic Exam     Labs/Imaging/Diagnostics   Labs:  CBC:  Recent Labs     09/17/22  0410 09/17/22  1549 09/17/22 2015 09/18/22  0445   WBC 12.5* 19.9* 25.6* 12.8*   RBC 3.19* 3.41* 3.63* 2.47*   HGB 9.5* 10.2* 10.8* 7.3*   HCT 29.5* 33.3* 33.3* 22.1*   MCV 92.5 97.7 91.7 89.5   PLT 88* 113* 141  --      CHEMISTRIES:  Recent Labs     09/17/22  1549 09/17/22 2015 09/18/22  0445    145 146*   K 3.7 3.8 3.6   CL 99 98 99   CO2 19* 18* 25   BUN 31* 36* 43*   CREATININE 1.4* 1.4* 1.8*   GLUCOSE 162* 112* 154*   PHOS 8.2*  --   --    MG 2.8* 2.6* 2.4     COAGULATION STUDIES:  Recent Labs     09/17/22  1549   INR 2.57*   APTT 110.3*     LIVER PROFILE:  Recent Labs     09/17/22  1549 09/17/22 2015 09/18/22 0445   * >7,000* 1,658*   * 637* 509*   BILIDIR 1.1*  --   --    BILITOT 2.4* 5.0* 5.5*   ALKPHOS 187* 236* 106     CHOLESTEROL AND A1C:  Recent Labs     09/14/22  1945   LABA1C 5.8      Imaging Last 24 Hours:  CTA HEAD W WO CONTRAST (CODE STROKE)    Result Date: 9/17/2022  PROCEDURE: CTA HEAD W WO CONTRAST, CTA NECK W WO CONTRAST CLINICAL INFORMATION: stroke. COMPARISON: CT scan of the brain obtained on the same day. . TECHNIQUE: 1 mm axial images were obtained through the head and neck after the fast bolus administration of contrast. A noncontrast localizer was obtained. 3-D reconstructions were performed on a dedicated 3-D workstation. These include multiplanar MPR images and multiplanar MIP images. Centerline reconstructions were obtained of the carotid systems. Isovue intravenous contrast was given. All carotid artery measurements are performed utilizing Nascet criteria.  This exam was the right internal carotid artery. No significant hemodynamic stenosis in the right common carotid artery. 2. 30% narrowing at the origin of left internal carotid artery. No significant hemodynamic stenosis in the left common carotid artery. 3. There is antegrade flow in the right and left vertebral arteries. 4. There is atherosclerotic calcification in the cavernous segments of both internal carotid arteries. There is no evidence of severe stenosis. 5. Otherwise negative CTA of the [place]. 6. Endotracheal and nasogastric tubes in place. 7. Bilateral pleural effusions. Areas of abnormal density in the left and right upper lobes. 8. Cervical spondylosis. **This report has been created using voice recognition software. It may contain minor errors which are inherent in voice recognition technology. ** Final report electronically signed by DR Juan Luis Pruitt on 9/17/2022 12:54 PM    CT HEAD WO CONTRAST (CODE STROKE)    Result Date: 9/17/2022  PROCEDURE: CT HEAD WO CONTRAST CLINICAL INFORMATION: code stroke. COMPARISON: No prior study. TECHNIQUE: Noncontrast 5 mm axial images were obtained through the brain. Sagittal and coronal reconstructions were obtained. All CT scans at this facility use dose modulation, iterative reconstruction, and/or weight-based dosing when appropriate to reduce radiation dose to as low as reasonably achievable. FINDINGS: Image quality was slightly degraded by patient motion artifact. There is mild atrophy and dilatation of the lateral ventricles. There is diminished attenuation in the white matter most likely representing ischemic changes There is atherosclerotic calcification in the cavernous segments of both internal carotid arteries. There is no hemorrhage. There are no intra-or extra-axial collections. There is no  midline shift or mass effect. . The paranasal sinuses and mastoid air cells are normally aerated. There is no suspicious calvarial abnormality.        1. Image quality was slightly degraded by patient motion artifact. 2. Mild atrophy and dilatation of the lateral ventricles. 3. Diminished attenuation in the white matter most likely representing ischemic changes. 4. Atherosclerotic calcification the cavernous segments of both internal carotid arteries. 5. Otherwise negative noncontrast CT scan of the brain. . 6. Results called to Lea Chávez at 12:19 PM. **This report has been created using voice recognition software. It may contain minor errors which are inherent in voice recognition technology. ** Final report electronically signed by DR Mina Rice on 9/17/2022 12:22 PM    CTA NECK W WO CONTRAST (CODE STROKE)    Result Date: 9/17/2022  PROCEDURE: CTA HEAD W WO CONTRAST, CTA NECK W WO CONTRAST CLINICAL INFORMATION: stroke. COMPARISON: CT scan of the brain obtained on the same day. . TECHNIQUE: 1 mm axial images were obtained through the head and neck after the fast bolus administration of contrast. A noncontrast localizer was obtained. 3-D reconstructions were performed on a dedicated 3-D workstation. These include multiplanar MPR images and multiplanar MIP images. Centerline reconstructions were obtained of the carotid systems. Isovue intravenous contrast was given. All carotid artery measurements are performed utilizing Nascet criteria. This exam was analyzed using viz.  contact CT LVO. All CT scans at this facility use dose modulation, iterative reconstruction, and/or weight-based dosing when appropriate to reduce radiation dose to as low as reasonably achievable. FINDINGS: CTA NECK:  Aortic arch and branches: There is normal origin of the brachiocephalic, left common carotid and left subclavian arteries from the aortic arch Right common carotid artery/ICA: There is 50% stenosis at the origin of the right internal carotid artery. Is no significant hemodynamic stenosis in the right common carotid artery.  Left common carotid artery/ICA: There is 30% stenosis at the origin of the right internal carotid artery. This no significant hemodynamic stenosis in the right common carotid artery. Vertebral arteries: There is antegrade flow in the right and left vertebral arteries CTA HEAD Internal carotid arteries: There is atherosclerotic calcification in the cavernous segments of both internal carotid arteries. There is no evidence of severe stenosis. . Middle cerebral arteries: There is antegrade flow in the middle cerebral arteries bilaterally. Sarah Red Anterior cerebral arteries: There is antegrade flow in the anterior cerebral arteries bilaterally. . Vertebral arteries: There is antegrade flow in both distal vertebral arteries. Basilar artery: There is antegrade flow in the basilar artery. Superior cerebellar arteries: There is antegrade flow in the superior cerebellar arteries bilaterally. Sarah Red Posterior cerebral arteries: There is antegrade flow in the posterior cerebral arteries bilaterally. No aneurysms, stenoses or occlusions are noted. The superior sagittal sinus, vein of [place], internal cerebral veins, straight sinus, transverse sinuses and sigmoid sinuses are patent. Axial source data: Endotracheal and enteric tubes in place. Bilateral pleural effusions. Areas of abnormal density in the left and right upper lobes. Cervical spondylosis. 1. 50% narrowing at the origin of the right internal carotid artery. No significant hemodynamic stenosis in the right common carotid artery. 2. 30% narrowing at the origin of left internal carotid artery. No significant hemodynamic stenosis in the left common carotid artery. 3. There is antegrade flow in the right and left vertebral arteries. 4. There is atherosclerotic calcification in the cavernous segments of both internal carotid arteries. There is no evidence of severe stenosis. 5. Otherwise negative CTA of the [place]. 6. Endotracheal and nasogastric tubes in place. 7. Bilateral pleural effusions.  Areas of abnormal density in the left and right upper lobes. 8. Cervical spondylosis. **This report has been created using voice recognition software. It may contain minor errors which are inherent in voice recognition technology. ** Final report electronically signed by DR Lisa Landin on 9/17/2022 12:54 PM    CTA CHEST W WO CONTRAST    Result Date: 9/17/2022  PROCEDURE: CTA CHEST W WO CONTRAST CLINICAL INFORMATION: PE. COMPARISON: No prior study. TECHNIQUE: 3 mm axial images were obtained through the chest after the administration of IV contrast.  A non-contrast localizer was obtained. 3D reconstructions were performed on the scanner to include MIP coronal and sagittal images through the chest. Isovue was the intravenous contrast utilized. All CT scans at this facility use dose modulation, iterative reconstruction, and/or weight-based dosing when appropriate to reduce radiation dose to as low as reasonably achievable. FINDINGS: There is an endotracheal tube in place. There is adequate opacification of the pulmonary arterial system. There is a filling defect in the right lower lobe pulmonary arterial branches suspicious for a pulmonary embolism. The aorta is within acceptable limits. There are small mediastinal lymph nodes present. There is cardiomegaly. There is a possible pericardial drainage catheter in place. . There is no pericardial  effusion. There are bilateral pleural effusions. There are areas of abnormal density in the right and left lower lobe consistent with infiltrates       1. Small filling defects in the right lower lobe pulmonary arterial branches consistent with pulmonary emboli. No signs of right ventricular strain. 2.. Cardiomegaly. Possible pericardial drainage catheter in place. 3. Bilateral pleural effusions. 4. Areas of abnormal density in the right left lower lobe consistent with infiltrates. 5. Small mediastinal lymph nodes present. **This report has been created using voice recognition software.  It may contain minor errors which are inherent in voice recognition technology. ** Final report electronically signed by DR Dav Jurado on 9/17/2022 2:55 PM    XR CHEST PORTABLE    Result Date: 9/18/2022  Chest X-ray: 1 view. Indication: Shortness of breath. Comparison: CR,SR - XR CHEST PORTABLE - 09/17/2022 03:48 PM EDT  CT,KOSR - CTA CHEST W WO CONTRAST - 09/17/2022 02:20 PM EDT Findings: Endotracheal tube tip 4.1 cm above the geronimo. Left subclavian catheter with tip in the SVC. Enteric tube with tip in the mid stomach, new since the prior study. Pericardial catheter. Mild interstitial edema, decreased. Left pleural effusion and atelectasis/infiltrate. The cardiac silhouette is enlarged and stable in size. Bony thorax is grossly intact. Impression: Decrease interstitial edema. Left pleural effusion and atelectasis/infiltrate, similar to previous study. This document has been electronically signed by: Cruzito Gallo MD on 09/18/2022 02:42 AM    XR CHEST PORTABLE    Result Date: 9/17/2022  PROCEDURE: XR CHEST PORTABLE CLINICAL INFORMATION: cvc COMPARISON: 9/15/2022 TECHNIQUE: A single mobile view of the chest was obtained. 1. Marked cardiomegaly. Coronary artery stents are present. A pericardial drainage catheter is in place. ET tube tip 3 cm proximal to the geronimo. The previously noted NG tube has been removed. Left subclavian line is present with tip in superior vena cava. 2. Moderate interstitial infiltrates involving both lungs relatively diffusely, consistent with interstitial pneumonia/edema. Appearance of chest has worsened since prior study. Questionable small effusion left side. **This report has been created using voice recognition software. It may contain minor errors which are inherent in voice recognition technology. ** Final report electronically signed by Dr. Raul Matos on 9/17/2022 4:09 PM    MRI BRAIN WO CONTRAST    Result Date: 9/18/2022  PROCEDURE: MRI BRAIN WO CONTRAST CLINICAL INFORMATION stroke alert. rightward gaze deviation.  left sided facial droop. cardiac arrest.. COMPARISON: CT scan of the brain and CTA angiogram dated 17th of September 2022. TECHNIQUE: Multiplanar and multiple spin echo MRI images were obtained of the brain without contrast. FINDINGS: There are areas of restricted diffusion in the left frontal, left parietal, right parietal, left posterior temporal, right posterior temporal/occipital, left occipital lobes, in the right and left cerebellar hemispheres. There is corresponding diminished  signal intensity on the ADC map consistent with acute infarcts. There is a punctate area of restricted diffusion in the right thalamus. The brain volume is slightly reduced. There is a mild amount of signal hyperintensity on the FLAIR and T2-weighted sequences in the white matter of the brain. This is consistent with mild severity chronic small vessel ischemic changes. There are no intra-or extra-axial collections. There is mild dilatation of the lateral ventricles. There is no midline shift or mass effect. There is mineralization in the medial aspects of the basal ganglia bilaterally. No other areas of susceptibility artifact are present. The major intracranial vascular flow voids are present. The midline craniocervical junction structures are normal.  The pituitary gland and brainstem are normal. There is fluid in the ethmoid, mastoid and sphenoid sinuses bilaterally and in the left maxillary sinus. There is fluid within the nasopharynx. 1. Areas of restricted diffusion in the left frontal, left parietal, right parietal, left posterior temporal, right posterior temporal/occipital, left occipital lobes and in the right and left cerebellar hemispheres with corresponding diminished signal intensity on the ADC map consistent with acute infarcts. 2. Mild atrophy and dilatation of the lateral ventricles. 3. Small infarct in the right thalamus.  4. Fluid in the ethmoid, mastoid and sphenoid sinuses bilaterally and in the left maxillary sinus. 5. Fluid within the nasopharynx. **This report has been created using voice recognition software. It may contain minor errors which are inherent in voice recognition technology. ** Final report electronically signed by DR Korin Benjamin on 9/18/2022 11:04 AM    FL MODIFIED BARIUM SWALLOW W VIDEO    Result Date: 9/17/2022  PROCEDURE: FL MODIFIED BARIUM SWALLOW W VIDEO CLINICAL INFORMATION: to further assess swallow function  Dysphagia TECHNIQUE: Fluoroscopy was provided for modified barium swallowing study performed by speech therapy. With the patient in the lateral projection, swallowing mechanism was evaluated using barium of various consistencies. The radiologist was available for the entire examination. 16 clips were obtained. There was 1 minute 29 seconds of fluoroscopy time. Speech therapist:  Tam Chaparro COMPARISON: None. FINDINGS: Oral, pharyngeal and esophageal structures appear normal.  There is no laryngeal penetration or aspiration of any barium consistency. 1. No laryngeal penetration or aspiration of barium. 2. Additional recommendations from speech therapist will follow. **This report has been created using voice recognition software. It may contain minor errors which are inherent in voice recognition technology. ** Final report electronically signed by DR Korin Benjamin on 9/17/2022 10:16 AM     Assessment and Plan:        1. Bilateral anterior and posterior circulation acute infarcts, likely secondary to paradoxical emboli in the setting of DVT/PE with iatrogenic atrial septal defect  Imaging  CT revealed no acute findings  CTA of head and neck revealed no significant stenosis or LVO. No need for carotid ultrasound. MRI of brain without contrast revealed bilateral anterior and posterior circulation strokes. Full read above.   Stat CT head is needed if the patient develops new-onset altered mental status, a severe headache, or new-onset neurologic deficit  Risk factors and medications  Hypertension management with goal blood pressure of <140/90 unless otherwise specified. Keep well hydrated. Initiate normal saline at 75 ml/hr as needed. Patient currently anticoagulated with heparin in light of massive PE. HgbA1C 5.8 on 9/14/2022. If the patient has diabetes, recommend tight control of A1c with goal of <7.0 if attainable. LDL 32 on 8/16/2022. LDL goal of 45-70. Continue Lipitor 10 mg. Smoking and alcohol cessation when applicable. Provide stroke eduction for individualized risk factors. Other recommendations  Dysphagia screen prior to oral intake  PT/OT/SLP Consult when appropriate. NIHSS every shift. Neuro checks per unit unless otherwise specified. Head of bed 45 degree. 2.  Seizure activity  Ceribell EEG headband placed per primary  Start continuous EEG monitoring when available  Loaded with Keppra 1 g. Start Keppra 500 mg twice daily. Dosing in conjunction with pharmacy given patient's kidney function. IV Ativan as needed for acute seizures. Order placed for 1 mg of Ativan IV as needed. Call with any additional seizure activity. This case was discussed with Dr. Kerrie Rondon and he is in agreement with the assessment and plan.     Electronically signed by Davon Gasca PA-C on 9/18/22 at 2:31 PM EDT

## 2022-09-18 NOTE — SIGNIFICANT EVENT
During transport from MRI cart back to bed, patient began to exhibit seizure like activity. Eyes deviated upwards and jaw clamped down on ETT. Event lasted approximately 15 seconds. VS remained stable and the patient was transported back to the unit. Upon arrival back to unit, patient had second episode. Eyes deviated upward. Arms and legs rigid. Rodriguez Escobar, Dr. Juliana Burns, and EL Nicole at bedside. Orders received to place Ceribell and leave on until continuous EEG can be obtained tomorrow.   Orders placed for Keppra infusion and Ativan PRN

## 2022-09-18 NOTE — PLAN OF CARE
Problem: Discharge Planning  Goal: Discharge to home or other facility with appropriate resources  9/18/2022 1324 by Krystyna Boland RN  Outcome: Not Progressing  Flowsheets (Taken 9/18/2022 0130 by Evan Feliciano, RN)  Discharge to home or other facility with appropriate resources:   Identify barriers to discharge with patient and caregiver   Arrange for needed discharge resources and transportation as appropriate   Identify discharge learning needs (meds, wound care, etc)   Refer to discharge planning if patient needs post-hospital services based on physician order or complex needs related to functional status, cognitive ability or social support system  9/18/2022 0130 by Evan Feliciano, RN  Outcome: Progressing  Flowsheets (Taken 9/18/2022 0130)  Discharge to home or other facility with appropriate resources:   Identify barriers to discharge with patient and caregiver   Arrange for needed discharge resources and transportation as appropriate   Identify discharge learning needs (meds, wound care, etc)   Refer to discharge planning if patient needs post-hospital services based on physician order or complex needs related to functional status, cognitive ability or social support system     Problem: Pain  Goal: Verbalizes/displays adequate comfort level or baseline comfort level  9/18/2022 1324 by Krystyna Boland RN  Outcome: Not Progressing  Flowsheets (Taken 9/18/2022 1324)  Verbalizes/displays adequate comfort level or baseline comfort level: Assess pain using appropriate pain scale  9/18/2022 0130 by Evan Feliciano RN  Outcome: Progressing  Flowsheets (Taken 9/18/2022 0130)  Verbalizes/displays adequate comfort level or baseline comfort level:   Encourage patient to monitor pain and request assistance   Assess pain using appropriate pain scale   Administer analgesics based on type and severity of pain and evaluate response   Implement non-pharmacological measures as appropriate and evaluate response   Notify Licensed Independent Practitioner if interventions unsuccessful or patient reports new pain     Problem: Respiratory - Adult  Goal: Will be able to breathe spontaneously, without ventilator support  Description: Will be able to breathe spontaneously, without ventilator support  9/18/2022 1324 by April Malik RN  Outcome: Not Progressing  9/18/2022 1039 by Humberto Santana RCP  Outcome: Not Progressing  9/18/2022 0235 by Freeman Dupree RCP  Outcome: Progressing  9/18/2022 0130 by Trev Robertson RN  Outcome: Progressing  Note: Remains mechanically ventilated at this time. Weaning as able.      Problem: ABCDS Injury Assessment  Goal: Absence of physical injury  9/18/2022 1324 by April Malik RN  Outcome: Not Progressing  Flowsheets (Taken 9/18/2022 0130 by Terv Robertson RN)  Absence of Physical Injury: Implement safety measures based on patient assessment  9/18/2022 0130 by Trev Robertson RN  Outcome: Progressing  Flowsheets  Taken 9/18/2022 0130  Absence of Physical Injury: Implement safety measures based on patient assessment  Taken 9/18/2022 0129  Absence of Physical Injury: Implement safety measures based on patient assessment     Problem: Nutrition Deficit:  Goal: Optimize nutritional status  9/18/2022 1324 by April Malik RN  Outcome: Not Progressing  Flowsheets (Taken 9/18/2022 0130 by Trev Robertson RN)  Nutrient intake appropriate for improving, restoring, or maintaining nutritional needs:   Assess nutritional status and recommend course of action   Provide specific nutrition education to patient or family as appropriate   Recommend, monitor, and adjust tube feedings and TPN/PPN based on assessed needs  9/18/2022 0130 by Trev Robertson RN  Outcome: Progressing  Flowsheets (Taken 9/18/2022 0130)  Nutrient intake appropriate for improving, restoring, or maintaining nutritional needs:   Assess nutritional status and recommend course of action   Provide specific nutrition education to patient or family as appropriate   Recommend, monitor, and adjust tube feedings and TPN/PPN based on assessed needs

## 2022-09-18 NOTE — PROGRESS NOTES
Noted [facility] patient who has not been anointed. This  contacted , who will be in later today to [facility] [phone]. Offered prayer at bedside.

## 2022-09-18 NOTE — PLAN OF CARE
Problem: Chronic Conditions and Co-morbidities  Goal: Patient's chronic conditions and co-morbidity symptoms are monitored and maintained or improved  Outcome: Progressing  Flowsheets (Taken 9/18/2022 0130)  Care Plan - Patient's Chronic Conditions and Co-Morbidity Symptoms are Monitored and Maintained or Improved:   Monitor and assess patient's chronic conditions and comorbid symptoms for stability, deterioration, or improvement   Collaborate with multidisciplinary team to address chronic and comorbid conditions and prevent exacerbation or deterioration   Update acute care plan with appropriate goals if chronic or comorbid symptoms are exacerbated and prevent overall improvement and discharge     Problem: Discharge Planning  Goal: Discharge to home or other facility with appropriate resources  Outcome: Progressing  Flowsheets (Taken 9/18/2022 0130)  Discharge to home or other facility with appropriate resources:   Identify barriers to discharge with patient and caregiver   Arrange for needed discharge resources and transportation as appropriate   Identify discharge learning needs (meds, wound care, etc)   Refer to discharge planning if patient needs post-hospital services based on physician order or complex needs related to functional status, cognitive ability or social support system     Problem: Pain  Goal: Verbalizes/displays adequate comfort level or baseline comfort level  Outcome: Progressing  Flowsheets (Taken 9/18/2022 0130)  Verbalizes/displays adequate comfort level or baseline comfort level:   Encourage patient to monitor pain and request assistance   Assess pain using appropriate pain scale   Administer analgesics based on type and severity of pain and evaluate response   Implement non-pharmacological measures as appropriate and evaluate response   Notify Licensed Independent Practitioner if interventions unsuccessful or patient reports new pain     Problem: Respiratory - Adult  Goal: Will be able to breathe spontaneously, without ventilator support  Description: Will be able to breathe spontaneously, without ventilator support  Outcome: Progressing  Note: Remains mechanically ventilated at this time. Weaning as able. Problem: ABCDS Injury Assessment  Goal: Absence of physical injury  Outcome: Progressing  Flowsheets  Taken 9/18/2022 0130  Absence of Physical Injury: Implement safety measures based on patient assessment  Taken 9/18/2022 0129  Absence of Physical Injury: Implement safety measures based on patient assessment     Problem: Safety - Adult  Goal: Free from fall injury  Outcome: Progressing  Flowsheets  Taken 9/18/2022 0130  Free From Fall Injury:   Instruct family/caregiver on patient safety   Based on caregiver fall risk screen, instruct family/caregiver to ask for assistance with transferring infant if caregiver noted to have fall risk factors  Taken 9/18/2022 0129  Free From Fall Injury:   Instruct family/caregiver on patient safety   Based on caregiver fall risk screen, instruct family/caregiver to ask for assistance with transferring infant if caregiver noted to have fall risk factors     Problem: Skin/Tissue Integrity  Goal: Absence of new skin breakdown  Description: 1. Monitor for areas of redness and/or skin breakdown  2. Assess vascular access sites hourly  3. Every 4-6 hours minimum:  Change oxygen saturation probe site  4. Every 4-6 hours:  If on nasal continuous positive airway pressure, respiratory therapy assess nares and determine need for appliance change or resting period. Outcome: Progressing  Note: No new skin breakdown noted this shift. Patient turned and repositioned every 2 hours and PRN. Will continue to monitor.         Problem: Nutrition Deficit:  Goal: Optimize nutritional status  Outcome: Progressing  Flowsheets (Taken 9/18/2022 0130)  Nutrient intake appropriate for improving, restoring, or maintaining nutritional needs:   Assess nutritional status and recommend course of action   Provide specific nutrition education to patient or family as appropriate   Recommend, monitor, and adjust tube feedings and TPN/PPN based on assessed needs   Care plan reviewed with patient. Patient unable to verbalize understanding of the plan of care and contribute to goal setting d/t intubation/sedation. No family currently present. Care narrated.

## 2022-09-18 NOTE — PROGRESS NOTES
CRITICAL CARE PROGRESS NOTE      Patient:  Amanda Olivier    Unit/Bed:4D-15/015-A  YOB: 1940  MRN: [id number]   PCP: Lainey Roman MD  Date of Admission: 9/14/2022  Chief Complaint:-Acute hypoxic respiratory failure    Assessment and Plan:      Acute hypoxic respiratory failure: Patient required intubation 9/17 after suffering a syncopal episode and later cardiac arrest.  Maintain peak pressures less than 35. Mental status precludes extubation  Cardiac arrest: Patient suffered cardiac arrest but after what appears to be massive pulmonary embolism. ROSC was obtained, tPA was given   Massive pulmonary embolism: Status post 50 mg tPA administration and heparin drip. CTA chest reports small distal PE. Encephalopathy: Has not waken post ROSC, stopped sedation. MRI pending. Mitral stenosis: Status post MitraClip with Dr. Joaquín Brand. Cardiac tamponade: Pericardial drain remains in place. Minimal output. Lactic acidosis: Secondary to cardiogenic shock and cardiac arrest.  Lactic trending down. Shock liver: Secondary to cardiac arrest, improving. Liver enzymes have reduced. WEI on CKD stage 3b: Creatinine 1.8. Patient is making urine. Continue to monitor. Stop Bumex drip  Anion gap metabolic acidosis: Secondary to renal failure and lactic acidosis. Is improving. No indication for bicarbonate at this time. Leukocytosis: Likely reactive. No fever. Normocytic anemia: Hemoglobin 7.3, hematocrit 22.1, no active signs of bleeding. Pericardial drain has minimal output. Monitor. Infuse 1 unit for goal hemoglobin 8 or greater  Thrombocytopenia: Platelets noted at 71, no active signs of bleeding. Status post tPA. Monitor. INITIAL H AND P AND ICU COURSE:  Allie Gilmore is an 80-year-old white female who presented to [facility] on 9/14/2022 for scheduled mitral clip with Dr. Joaquín Brand.   She is past medical history of lifetime non-smoker, WEI, aortic stenosis, diabetes mellitus, hypertension, hypothyroidism, severe mitral regurgitation. On 9/14 she underwent MitraClip procedure due to severe mitral regurgitation. During procedure she did have hemorrhage and cardiac tamponade. She became hypotensive and tachycardic. She was admitted to the ICU with pericardial drain in place. She was noted to have low ventricular ejection fraction postprocedure and return to Cath Lab for balloon pump insertion. On 9/15 patient was requiring nitroglycerin. Explantation of balloon pump. On 9/17 the patient became hypotensive and had syncope. There was concern for stroke and stat CTA was obtained, negative for LVO. Stat MRI was ordered per neuro. While waiting for MRI she did suffer cardiac arrest.  ECHO did show large PE, tPA was given during code blue. ROSC was obtained. She returned to ICU requiring pressors. BANDAR was done which showed no right heart strain and pressures were titrated off. She remains in ICU for additional care. Past Medical History:  per HPI  Family History: Mother: Hypertension, diabetes, heart disease Father: Lung cancer  Social History: Lifetime non-smoker, denies alcohol use, denies drug use. ROS   Mental status precludes review of systems    Scheduled Meds:   pantoprazole  40 mg IntraVENous BID    insulin lispro  0-16 Units SubCUTAneous TID WC    insulin lispro  0-4 Units SubCUTAneous Nightly    sodium chloride flush  5-40 mL IntraVENous 2 times per day    atorvastatin  10 mg Oral Daily    levothyroxine  100 mcg Oral Daily    calcium replacement protocol   Other RX Placeholder     Continuous Infusions:   sodium chloride      sodium chloride      dexmedetomidine      heparin (PORCINE) Infusion 14 Units/kg/hr (09/18/22 0305)    bumetanide 0.1 mg/mL infusion 1 mg/hr (09/18/22 0553)    sodium chloride 75 mL/hr at 09/17/22 1052    dextrose         PHYSICAL EXAMINATION:  T:  98.1. P:  80. RR:  12. B/P:  132/56. FiO2:  30. O2 Sat:  100.   I/O:  2538/275  Body mass index is 21.34 kg/m². PC: 15/6: TV: 461: RRTotal: 12: Ti:1 sec  General: Acute on chronically ill-appearing white female  HEENT:  normocephalic and atraumatic. No scleral icterus. PERR  Neck: supple. No Thyromegaly. Lungs: clear to auscultation. No retractions  Cardiac: RRR. No JVD. Abdomen: soft. Nontender. Extremities:  No clubbing, cyanosis, or edema x 4. Vasculature: capillary refill < 3 seconds. Palpable dorsalis pedis pulses. Skin:  warm and dry. Discoloration to right lower extremity shin  Psych: Withdraws in pain in upper and lower extremities. Lymph:  No supraclavicular adenopathy. Neurologic:  No focal deficit. No seizures. Data: (All radiographs, tracings, PFTs, and imaging are personally viewed and interpreted unless otherwise noted). Sodium 46, potassium 3.6, chloride 99, CO2 25, BUN 43, creatinine 1.8, anion gap 22, glucose 154, lactic acid 7.6. WBC 12.8, hemoglobin 7.3, hematocrit 22.1  Telemetry shows normal sinus rhythm  Chest x-ray 9/18/2022 reports decreased interstitial edema, left pleural effusion and atelectasis  CTA chest 9/17/2020 reports small filling defects in the right lower pulmonary artery branches consistent with pulmonary embolism. No signs of right ventricular strain. CT head 9/17/2022 reports mild atrophy and dilation of ventricles. CTA head and neck 9/17/2022 reports 50% narrowing of the right internal carotid artery. 30% narrowing of the left internal carotid. Negative CTA of [place]. Meets Continued ICU Level Care Criteria:    [x] Yes   [] No - Transfer Planned to listed location:  [] HOSPITALIST CONTACTED-      Case and plan discussed with Dr. Pina Coyle and Dr. Yazmin Ledesma        Electronically signed by Rosy Mahan. SHANNON Harrell - CNP  CRITICAL CARE SPECIALIST     Patient seen and examined independently by me. Above discussed and I agree with Catherine Torres, and the abovenote except where indicated in the EMR revision history.  Also see my additional comments and changes indicated by discrete font, text color, italics, and/or initials. Labs, cultures, and radiographs where available were reviewed. Changes were made in the orders as necessary. I discussed patient concerns with Alvina'lidya MOTT and instructions were given. Respiratory care issues addressed. Please see our orders for the updated patient care plan.   Ccm:32  Electronically signed by     Malika Bolaños MD on 9/18/2022 at 1:06 PM

## 2022-09-18 NOTE — PROGRESS NOTES
Cardiology Progress Note      Patient:  Daron Morris  YOB: 1940  MRN: [id number]   Acct: [de-identified]  Admit Date:  9/14/2022  Primary Cardiologist:  Ingrid Giraldo    Chief Complaint: Large PE, cardiac arrest, s/p tPA, s/p MitraClip    Subjective (Events in last 24 hours):   See progress note from yesterday  Overnight, major improvement, off pressors  Withdraws to pain and moving spontaneously  Minimal vent settings  Groins without bleeding  Pericardial drain without output        Objective:   BP (!) 130/53   Pulse 84   Temp 97.7 °F (36.5 °C)   Resp 12   Ht 5' 4\" (1.626 m)   Wt 124 lb 5.4 oz (56.4 kg)   SpO2 100%   BMI 21.34 kg/m²        TELEMETRY: NSR    Physical Exam:  General Appearance: intubated/sedated  Cardiovascular: normal rate, regular rhythm, normal S1 and S2, no murmurs, rubs, clicks, or gallops, distal pulses intact, no carotid bruits, no JVD; pericardial drain - no output  Pulmonary/Chest: clear to auscultation bilaterally- no wheezes, rales or rhonchi, normal air movement, no respiratory distress  Abdomen: soft, non-tender, non-distended, normal bowel sounds, no masses Extremities: no cyanosis, clubbing or edema, pulse   Skin: warm and dry, no rash or erythema  Head: normocephalic and atraumatic  Eyes: pupils equal, round, and reactive to light  Neck: supple and non-tender without mass, no thyromegaly   Musculoskeletal: normal range of motion, no joint swelling, deformity or tenderness  Neurological: sedated/intubated    Medications:    pantoprazole  40 mg IntraVENous BID    insulin lispro  0-16 Units SubCUTAneous TID WC    insulin lispro  0-4 Units SubCUTAneous Nightly    sodium chloride flush  5-40 mL IntraVENous 2 times per day    atorvastatin  10 mg Oral Daily    levothyroxine  100 mcg Oral Daily    calcium replacement protocol   Other RX Placeholder      sodium chloride      sodium chloride      dexmedetomidine      heparin (PORCINE) Infusion 14 Units/kg/hr (09/18/22 3627) sodium chloride 75 mL/hr at 09/17/22 1052    dextrose       sodium chloride, , PRN  sodium chloride, , PRN  heparin (porcine), 80 Units/kg, PRN  heparin (porcine), 40 Units/kg, PRN  sodium chloride flush, 5-40 mL, PRN  ondansetron, 4 mg, Q8H PRN   Or  ondansetron, 4 mg, Q6H PRN  polyethylene glycol, 17 g, Daily PRN  acetaminophen, 650 mg, Q6H PRN   Or  acetaminophen, 650 mg, Q6H PRN  fentanNYL, 25 mcg, Q1H PRN  glucose, 4 tablet, PRN  dextrose bolus, 125 mL, PRN   Or  dextrose bolus, 250 mL, PRN  glucagon (rDNA), 1 mg, PRN  dextrose, , Continuous PRN      Lab Data:    Cardiac Enzymes:  No results for input(s): CKTOTAL, CKMB, CKMBINDEX, TROPONINI in the last 72 hours.     CBC:   Lab Results   Component Value Date/Time    WBC 12.8 09/18/2022 04:45 AM    RBC 2.47 09/18/2022 04:45 AM    HGB 7.3 09/18/2022 04:45 AM    HCT 22.1 09/18/2022 04:45 AM    PLT 71 09/18/2022 04:45 AM       CMP:    Lab Results   Component Value Date/Time     09/18/2022 04:45 AM    K 3.6 09/18/2022 04:45 AM    K 3.7 09/17/2022 04:10 AM    CL 99 09/18/2022 04:45 AM    CO2 25 09/18/2022 04:45 AM    BUN 43 09/18/2022 04:45 AM    CREATININE 1.8 09/18/2022 04:45 AM    GFRAA 27 07/12/2022 09:39 AM    LABGLOM 27 09/18/2022 04:45 AM    GLUCOSE 154 09/18/2022 04:45 AM    CALCIUM 10.2 09/18/2022 04:45 AM       Hepatic Function Panel:    Lab Results   Component Value Date/Time    ALKPHOS 106 09/18/2022 04:45 AM     09/18/2022 04:45 AM    AST 1,658 09/18/2022 04:45 AM    PROT 5.4 09/18/2022 04:45 AM    BILITOT 5.5 09/18/2022 04:45 AM    BILIDIR 1.1 09/17/2022 03:49 PM    LABALBU 4.5 09/18/2022 04:45 AM       Magnesium:    Lab Results   Component Value Date/Time    MG 2.4 09/18/2022 04:45 AM       PT/INR:    Lab Results   Component Value Date/Time    INR 2.57 09/17/2022 03:49 PM       HgBA1c:    Lab Results   Component Value Date/Time    LABA1C 5.8 09/14/2022 07:45 PM       FLP:    Lab Results   Component Value Date/Time    TRIG 89 08/16/2022 08:57 PM    HDL 31 08/16/2022 08:57 PM    LDLCALC 32 08/16/2022 08:57 PM       TSH:    Lab Results   Component Value Date/Time    TSH 0.59 05/25/2022 12:10 PM         Assessment:  S/p Massive PE with clot in transit seen in the RA and then in the PA with paradoxical embolization to presumed R MCA - negative CTA head, positive CTA PE s/p cardiac arrest, s/p tPA  Shock Liver - resolving  WEI - oliguric  S/p MitraClip XTW x 1 at A2-P2  Cardiac Tamponade 2/2 probable RA injury with clip advancement s/p pericardial drain  Cardiogenic Shock 2/2 acute increase in afterload with successful MitraClip with V wave from 80 to 25, EF dropped from 35 to 5%, SCAI C, s/p IABP  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  Hx of LAD/Dx PCI, 8/17/22  CKD, stage 3b  NIDDM       Plan:  Neuro - check MRI without contrast today to evaluate any possible ischemic/hypoxic injury, responds to pain, pupils reactive, d/c sedation  CV - s/p clip, EF stable around 20-25%, Rv function was minimal and recovered post tPA, s/p tamponade with drain - no further drainage or effusion, CVP 8-10 mmHg; off pressors, follow lactate  WEI - oliguric, monitor for now, no acute indication for dialyisis  GI - shock liver, recovering quickly, supportive care, will initiate TF, PPI   Heme - Mild anemia, thrombocytopenia, continue to follow, check smear/retic/haptoglobin/LDH - likely consumptive from thrombus and dilutional given all the counts dropped, Trf to keep Hgb > 10 as long as volume and CVP tolerates; Last dose of Plavix was 9/14 - check P2Y12 level  Resp - intubated, minimal vent settings, CXR improved and no congestion, mild cardiomegaly  ID  - WBC is reactive, no fevers  Endo - BS< 180  Will start cardiac medical therapy once we understand Neuro prognosis  Further recommendations based on results and clinical course    Greater than 120 minutes of time was spent managing critical issues, discussing with the family, and coordinating care.       Electronically signed by Perez Gaytan MD on 9/18/2022 at 9:40 AM

## 2022-09-18 NOTE — PLAN OF CARE
Problem: Respiratory - Adult  Goal: Will be able to breathe spontaneously, without ventilator support  Description: Will be able to breathe spontaneously, without ventilator support  9/18/2022 1039 by Lita Liu RCP  Outcome: Not Progressing   Vent setting optimized to achieve target tidal volume, respiratory rate and ideal oxygen saturations. SBT will be performed when appropriate. Patient Weaning Progress    The patient's vent settings was not able to be weaned this shift. Ventilator settings that were weaned              [] Mode   [] Pressure support weaned   [] Fio2 weaned   [] Peep weaned             Spontaneous weaning trial  was not attempted. Evac tube was  hooked up with continuous low suction(20-30mmHg)      Cuff was not  deflated to determine cuff leak. *Specific details of weaning located in Ventilator documentation flowsheets*    Unable to get agreement for goals because no family is present and patient cannot respond.

## 2022-09-18 NOTE — PLAN OF CARE
Problem: Respiratory - Adult  Goal: Will be able to breathe spontaneously, without ventilator support  Description: Will be able to breathe spontaneously, without ventilator support  9/18/2022 0235 by Salima Cantrell RCP  Outcome: Progressing     Unable to get agreement for goals because no family is present and patient cannot respond. Patient Weaning Progress    The patient's vent settings was able to be weaned this shift. Ventilator settings that were weaned              [] Mode   [] Pressure support weaned   [] Fio2 weaned   [] Peep weaned             Spontaneous weaning trial  was not attempted.        *Specific details of weaning located in Ventilator documentation flowsheets*

## 2022-09-19 NOTE — PROGRESS NOTES
09/19/22 1120   Encounter Summary   Encounter Overview/Reason  Crisis   Service Provided For: Patient   Last Encounter  09/19/22  (Pt Coded)   Complexity of Encounter High   Begin Time 1055   End Time  1120   Total Time Calculated 25 min   Encounter    Type Follow up   Crisis   Type Code Blue   Spiritual/Emotional needs   Type Spiritual Support   Assessment/Intervention/Outcome   Intervention Prayer (assurance of)/[place];Sustaining Presence/Ministry of presence   At the time the patient coded there were no family members present. The patient was non-responsive and was being attended to by the staff. I provided a prayer of comfort as said the Lord's Prayer.   I worked with the nursing supervisor as they attempted to inform the pt's family. Plan:   When the family arrives, Spiritual support will be provided to assist with emotional and spiritual needs.

## 2022-09-19 NOTE — PLAN OF CARE
Problem: Chronic Conditions and Co-morbidities  Goal: Patient's chronic conditions and co-morbidity symptoms are monitored and maintained or improved  9/19/2022 0110 by Javy May RN  Outcome: Progressing  Flowsheets (Taken 9/18/2022 2000)  Care Plan - Patient's Chronic Conditions and Co-Morbidity Symptoms are Monitored and Maintained or Improved:   Monitor and assess patient's chronic conditions and comorbid symptoms for stability, deterioration, or improvement   Collaborate with multidisciplinary team to address chronic and comorbid conditions and prevent exacerbation or deterioration   Update acute care plan with appropriate goals if chronic or comorbid symptoms are exacerbated and prevent overall improvement and discharge  9/18/2022 1324 by Arelis Pollock RN  Outcome: Progressing  Flowsheets (Taken 9/18/2022 1324)  Care Plan - Patient's Chronic Conditions and Co-Morbidity Symptoms are Monitored and Maintained or Improved:   Monitor and assess patient's chronic conditions and comorbid symptoms for stability, deterioration, or improvement   Collaborate with multidisciplinary team to address chronic and comorbid conditions and prevent exacerbation or deterioration   Update acute care plan with appropriate goals if chronic or comorbid symptoms are exacerbated and prevent overall improvement and discharge     Problem: Discharge Planning  Goal: Discharge to home or other facility with appropriate resources  9/19/2022 0110 by Javy May RN  Outcome: Progressing  Flowsheets (Taken 9/18/2022 2000)  Discharge to home or other facility with appropriate resources: Identify barriers to discharge with patient and caregiver  9/18/2022 1324 by Arelis Pollock RN  Outcome: Not Progressing  Flowsheets (Taken 9/18/2022 0130 by Jimi Fuentes RN)  Discharge to home or other facility with appropriate resources:   Identify barriers to discharge with patient and caregiver   Arrange for needed discharge resources and transportation as appropriate   Identify discharge learning needs (meds, wound care, etc)   Refer to discharge planning if patient needs post-hospital services based on physician order or complex needs related to functional status, cognitive ability or social support system     Problem: Pain  Goal: Verbalizes/displays adequate comfort level or baseline comfort level  9/19/2022 0110 by Ethel Zendejas RN  Outcome: Progressing  Flowsheets (Taken 9/18/2022 2000)  Verbalizes/displays adequate comfort level or baseline comfort level:   Assess pain using appropriate pain scale   Administer analgesics based on type and severity of pain and evaluate response   Implement non-pharmacological measures as appropriate and evaluate response   Notify Licensed Independent Practitioner if interventions unsuccessful or patient reports new pain  9/18/2022 1324 by Cristiano Prince RN  Outcome: Not Progressing  Flowsheets (Taken 9/18/2022 1324)  Verbalizes/displays adequate comfort level or baseline comfort level: Assess pain using appropriate pain scale     Problem: Respiratory - Adult  Goal: Will be able to breathe spontaneously, without ventilator support  Description: Will be able to breathe spontaneously, without ventilator support  9/19/2022 0110 by Ethel Zendejas RN  Outcome: Progressing  Note: Patient still on ventilator at this time. Weaning as able.    9/18/2022 1324 by Cristiano Prince RN  Outcome: Not Progressing     Problem: ABCDS Injury Assessment  Goal: Absence of physical injury  9/19/2022 0110 by Ethel Zendejas RN  Outcome: Progressing  Flowsheets (Taken 9/18/2022 2327)  Absence of Physical Injury: Implement safety measures based on patient assessment  9/18/2022 1324 by Cristiano Prince RN  Outcome: Not Progressing  Flowsheets (Taken 9/18/2022 0130 by Ethel Zendejas RN)  Absence of Physical Injury: --     Problem: Safety - Adult  Goal: Free from fall injury  9/19/2022 0110 by Ethel Zendejas RN  Outcome: Progressing  Flowsheets (Taken 9/18/2022 2327)  Free From Fall Injury:   Instruct family/caregiver on patient safety   Based on caregiver fall risk screen, instruct family/caregiver to ask for assistance with transferring infant if caregiver noted to have fall risk factors  9/18/2022 1324 by Oneyda Graves RN  Outcome: Progressing  Flowsheets (Taken 9/18/2022 0130 by Gabriela Nicholson RN)  Free From Fall Injury: --     Problem: Skin/Tissue Integrity  Goal: Absence of new skin breakdown  Description: 1. Monitor for areas of redness and/or skin breakdown  2. Assess vascular access sites hourly  3. Every 4-6 hours minimum:  Change oxygen saturation probe site  4. Every 4-6 hours:  If on nasal continuous positive airway pressure, respiratory therapy assess nares and determine need for appliance change or resting period. 9/19/2022 0110 by Gabriela Nicholson RN  Outcome: Progressing  Note: Scattered abrasions and ecchymosis. No new evidence of skin breakdown noted.    9/18/2022 1324 by Oneyda Graves RN  Outcome: Progressing     Problem: Nutrition Deficit:  Goal: Optimize nutritional status  9/19/2022 0110 by Gabriela Nicholson RN  Outcome: Progressing  Flowsheets (Taken 9/18/2022 0130 by Vernon Michelle RN)  Nutrient intake appropriate for improving, restoring, or maintaining nutritional needs:   Assess nutritional status and recommend course of action   Provide specific nutrition education to patient or family as appropriate   Recommend, monitor, and adjust tube feedings and TPN/PPN based on assessed needs  9/18/2022 1324 by Oneyda Graves RN  Outcome: Not Progressing  Flowsheets (Taken 9/18/2022 0130 by Vernon Michelle RN)  Nutrient intake appropriate for improving, restoring, or maintaining nutritional needs:   Assess nutritional status and recommend course of action   Provide specific nutrition education to patient or family as appropriate   Recommend, monitor, and adjust tube feedings and TPN/PPN based on assessed needs     Problem: Discharge Planning  Goal: Discharge to home or other facility with appropriate resources  9/19/2022 0110 by Latonya Perez RN  Outcome: Progressing  Flowsheets (Taken 9/18/2022 2000)  Discharge to home or other facility with appropriate resources: Identify barriers to discharge with patient and caregiver  9/18/2022 1324 by Kiki Mercado RN  Outcome: Not Progressing  Flowsheets (Taken 9/18/2022 0130 by David Mckeon RN)  Discharge to home or other facility with appropriate resources:   Identify barriers to discharge with patient and caregiver   Arrange for needed discharge resources and transportation as appropriate   Identify discharge learning needs (meds, wound care, etc)   Refer to discharge planning if patient needs post-hospital services based on physician order or complex needs related to functional status, cognitive ability or social support system     Problem: Pain  Goal: Verbalizes/displays adequate comfort level or baseline comfort level  9/19/2022 0110 by Latonya Perez RN  Outcome: Progressing  Flowsheets (Taken 9/18/2022 2000)  Verbalizes/displays adequate comfort level or baseline comfort level:   Assess pain using appropriate pain scale   Administer analgesics based on type and severity of pain and evaluate response   Implement non-pharmacological measures as appropriate and evaluate response   Notify Licensed Independent Practitioner if interventions unsuccessful or patient reports new pain  9/18/2022 1324 by Kiki Mercado RN  Outcome: Not Progressing  Flowsheets (Taken 9/18/2022 1324)  Verbalizes/displays adequate comfort level or baseline comfort level: Assess pain using appropriate pain scale     Problem: Respiratory - Adult  Goal: Will be able to breathe spontaneously, without ventilator support  Description: Will be able to breathe spontaneously, without ventilator support  9/19/2022 0110 by Latonya Perez RN  Outcome: Progressing  Note: Patient still on ventilator at this time. Weaning as able. 9/18/2022 1324 by Krista Enriquez RN  Outcome: Not Progressing     Problem: ABCDS Injury Assessment  Goal: Absence of physical injury  9/19/2022 0110 by Valdo Dangelo RN  Outcome: Progressing  Flowsheets (Taken 9/18/2022 2327)  Absence of Physical Injury: Implement safety measures based on patient assessment  9/18/2022 1324 by Krista Enriquez RN  Outcome: Not Progressing  Flowsheets (Taken 9/18/2022 0130 by Valdo Dangelo RN)  Absence of Physical Injury: --     Problem: Nutrition Deficit:  Goal: Optimize nutritional status  9/19/2022 0110 by Valdo Dangelo RN  Outcome: Progressing  Flowsheets (Taken 9/18/2022 0130 by Trygve Felty, RN)  Nutrient intake appropriate for improving, restoring, or maintaining nutritional needs:   Assess nutritional status and recommend course of action   Provide specific nutrition education to patient or family as appropriate   Recommend, monitor, and adjust tube feedings and TPN/PPN based on assessed needs  9/18/2022 1324 by Krista Enriquez RN  Outcome: Not Progressing  Flowsheets (Taken 9/18/2022 0130 by Trygve Felty, RN)  Nutrient intake appropriate for improving, restoring, or maintaining nutritional needs:   Assess nutritional status and recommend course of action   Provide specific nutrition education to patient or family as appropriate   Recommend, monitor, and adjust tube feedings and TPN/PPN based on assessed needs     Care plan reviewed with patient. Patient unable verbalize understanding of the plan of care and contribute to goal setting due to intubation and sedation. No family at bedside.

## 2022-09-19 NOTE — PLAN OF CARE
Problem: Respiratory - Adult  Goal: Will be able to breathe spontaneously, without ventilator support  Description: Will be able to breathe spontaneously, without ventilator support  9/19/2022 0618 by Darius Phillips RCP  Outcome: Progressing     Vent setting optimized to achieve target tidal volume, respiratory rate and ideal oxygen saturations. SBT will be performed when appropriate. Patient Weaning Progress    The patient's vent settings was not able to be weaned this shift. Ventilator settings that were weaned              [] Mode   [] Pressure support weaned   [] Fio2 weaned   [] Peep weaned             Spontaneous weaning trial  was not attempted. *Specific details of weaning located in Ventilator documentation flowsheets*       Unable to get agreement for goals because no family is present and patient cannot respond.

## 2022-09-19 NOTE — PLAN OF CARE
Problem: Respiratory - Adult  Goal: Will be able to breathe spontaneously, without ventilator support  Description: Will be able to breathe spontaneously, without ventilator support  9/19/2022 0913 by iRck Lomax RCP  Outcome: Progressing  Note:                                              Patient Weaning Progress    The patient's vent settings was able to be weaned this shift. Ventilator settings that were weaned              [] Mode   [] Pressure support weaned   [x] Fio2 weaned   [] Peep weaned             Spontaneous weaning trial  was not attempted. Evac tube was  hooked up with continuous low suction(20-30mmHg)      Cuff was not  deflated to determine cuff leak. *Specific details of weaning located in Ventilator documentation flowsheets*   Unable to get agreement for goals because no family is present and patient cannot respond.

## 2022-09-19 NOTE — PROGRESS NOTES
CRITICAL CARE PROGRESS NOTE      Patient:  Saad Obrien    Unit/Bed:4D-15/015-A  YOB: 1940  MRN: [id number]   PCP: Bridger Jeong MD  Date of Admission: 9/14/2022    Chief Complaint:- cardiac tamponade         Assessment and Plan:      Cardiac Tamponade: Patient underwent Mitral Clip procedure 9/14, and developed cardiac tamponade. Pericardial drain was placed. Patient noted to have left ventricular dysfunction with low EF. Intra-aortic balloon pump to augment support placed (9/14), removed (9/15) successfully. Pericardial drain switched off suction to gravity bag (9/16). Will continue to flush drain. On 9/19/2022, she had bradycardia and 50 cc of emanuel blood was drained from her pericardial sac. Cardiology following. Acute Hypoxic Respiratory Failure: Patient's respiratory status quickly declined post MitraClip procedure secondary to hypovolemic shock from hemorrhage. Was intubated and has since been extubated on 9/15. Patient was reintubated again on 9/17/2022 because of stroke due to paradoxical effect of acute PE and corresponding decline in respiratory status. Maintain O2 sats > 90%. Will continue to wean NC as tolerated. Hypovolemic Shock: Hemorrhagic in nature due to bleed during Mitraclip procedure. At that time, patient became hypotensive, respiratory status declined, and required massive transfusion (PRBC-11 units, FFP-4 units, Platelets-1 unit). Lactic acid was elevated, now normal. No pressor requirement at this time and HR has stabilized. Cardiogenic shock: Had pericardial effusion. Had 50 cc drained from the pericardial sac on 9/18/2022. Was also bradycardic. EKG on 9/18/2022 demonstrated a QTC of 550. Went into V. fib, and was shocked. Received epinephrine, amiodarone, and.  Pericardial drain in place. Currently on vasopressin.    Ischemic stroke: MRI brain without contrast demonstrates areas of restricted diffusion in the left frontal, left parietal, left posterior temporal, right posterior temporal/occipital lobes. Patient is unconscious right now despite not on sedatives. Neurology following. Etiology due to paradoxical emobolization from acute PE with sub-clot transitioning across ASD from mitral clip procedure. New Onset Afib with RVR:  Patient noted to go into Afib with RVR on tele, EKG confirmed. Cardiology notified. Severe Mitral Regurgitation, s/p MitraClip. Cardiology following. Pulmonary embolism/DVT:  massive PE with right heart strain identified on echocardiogram completed 7/17/22. She received tPA on 7/17/2022. On subsequent heparin drip. Torsades [place]rell Pointes:  Associated with initial bradycardia resulting in code on 9/19/22. Epinephrine drip contributed to dysrhythmia. S/P magnesium. Transitioning away from epinephrine to vasopressin. Multivessel Disease: Patient had undergone cardiac cath on 6/9/2022. Significant multivessel disease was noted with 100% RCA. 100% left circumflex with large OM1.  60 to 70% in the mid LAD and 90% blockage in the first diagonal. She was taken to the Cath Lab on 8/17/2022 per Dr. Nery Tavera and a T stent was placed linking the LAD to the diagonal. Successful LAD diagonal bifurcation PCI, RCA , and Left Circumflex  done at that time. Noted to have severe symptomatic mitral regurgitation and severe pulmonary HTN. MitraClip done (9/14) with Dr. Nery Tavera. Labile Blood Pressure: Remaining stable. Patient initially hypotensive and requiring pressor support, had become hypertensive and needing Nitroglycerin. 9/19/2022,  she was put back on the pressors because of worsening hypotension and ventricular fibrillation. Maintain MAP > 65. WEI on CKD Stage 3b: Improving. No formal diagnosis of CKD, per result review eGFR has remained in range for Stage 3b over last several months. Creatinine 1.5 on arrival, improving to 1.2. Toribio catheter removed (9/16), continues to have good urine output.  Will monitor labs and strict I/O's. Electrolyte Disturbances: Hypokalemia, maintain > 4.0. Hypomagnesemia, maintain > 2.0. Hypocalcemia, trend ionized calcium. Will replace as needed. Non-Insulin Dependent DM2: A1c from 8/17/22 7.0, controlled for her age. Patient on no medication per chart review. SSI started as glucose numbers have been elevated 2/2 to stress hormones on t he body. Will monitor with accu-checks. Idiopathic Cardiomyopathy and HFrEF: Transesophageal ECHO on 6/23/22 demonstrated severe global hypokinesis. EF 30-35%, Left atrium size severely dilated with no thrombus. Hypothyroidism: TSH normal in 5/22. On home synthroid. INITIAL H AND P AND ICU COURSE:  Patient is 80year old female with PMHx of Severe Mitral Regurgitation, Pulmonary HTN, Multivessel Disease, DM2, HTN, Idiopathic Cardiomyopathy, HFrEF, Hypothyroidism. Patient presented to [facility] on 9/14 for MitraClip procedure due to severe mitral regurgitation noted on previous diagnostic cath in August 2022. During procedure, massive hemorrhagic bleed noted that led to Cardiac Tamponade. At that time, the patient became hypotensive (SBP in 40's), Tachycardic ('s), and respiratory status decreased. Patient presented to ICU at this time, patient was in need of massive transfusion. Levo and Epi started pericardial drain was placed and atrium placed at -40, later adjusted to -10 with better results. Patient noted to have left ventricular dysfunction with low EF. Patient to return back to Cath lab for planned transition to intra-aortic balloon pump to augment support. 9/15:  Patient is off pressor support and became hypertensive overnight. Started on Nitroglycerin, has since been stopped. Discussed case with Dr. Adal Nguyen, will plan to give Albumin and additional unit of PRBC's to help with CVP. Plan to remove IABP today and possible extubation after procedure.  Will leave pericardial drain in until patient has < 50 cc/24 hrs.      9/16:  Patient extubated last evening successfully without issues. Potassium and magnesium replaced this AM. Pericardial drain had minimal drainage overnight, per cardiology will switch pericardial drain from suction to gravity at noon. Patient no longer intubated and without pressors, will plan to transfer to  under our care, will not transfer out of our care over the weekend. 9/17:  Patient afebrile overnight and stable vital signs noted. Pericardial drain with gravity bag in place, no noted output overnight. Minimal drainage in bag this AM. Toribio catheter removed over the evening and patient able to get up and use restroom with good urine output. WEI has resolved. Dr. Matthew Hernandez following. Patient to remain under our service over the weekend, not to be transferred. Later in the morning, patient went into Afib with RVR. No hx of Afib. Patient was having no symptoms. Became hypotensive. EKG confirmed Afib with RVR with incomplete left bundle branch block. Dr. Matthew Hernandez also contacted, wants 1 unit PRBC's given and ECHO to be done. Neosynephrine ordered for hypotension. 9/19: 9/19/2022 she went into bradycardia and V. fib. A code was run. She was shocked twice. 1 synchronous and  1 nonsynchronous. He was given epinephrine, sodium bicarb's, amiodarone, and she was given 1 unit of packed red cells. She is currently on vasopressin. Neurology asked to withhold Versed because of EEG monitoring. Past Medical History: Same as HPI  Family History: Mother hypertension, diabetes, heart disease. Father lung disease. Social History: Non-smoker, denies alcohol use, denies drug use. ROS   Limited because patient is unconscious.     Scheduled Meds:   clopidogrel  75 mg Oral Daily    levETIRAcetam  500 mg IntraVENous Q12H    pantoprazole  40 mg IntraVENous BID    insulin lispro  0-16 Units SubCUTAneous TID WC    insulin lispro  0-4 Units SubCUTAneous Nightly    sodium chloride flush  5-40 mL IntraVENous 2 times per day atorvastatin  10 mg Oral Daily    levothyroxine  100 mcg Oral Daily    calcium replacement protocol   Other RX Placeholder     Continuous Infusions:   sodium chloride 100 mL/hr at 09/19/22 1303    EPINEPHrine Stopped (09/19/22 1305)    sodium chloride      midazolam      vasopressin (Septic Shock) infusion 0.04 Units/min (09/19/22 1301)    niCARdipine Stopped (09/19/22 0514)    sodium chloride      heparin (PORCINE) Infusion 16 Units/kg/hr (09/19/22 1301)    dextrose         PHYSICAL EXAMINATION:  T: 98.1. P: 80. RR: 12. B/P: 127/49. FiO2: ED. O2 Sat: 100. I/O: 820.3/790.3  Body mass index is 21.34 kg/m². GCS:   6  PC: 15/6: TV: 317: RRTotal: 12: Ti:1 sec:   General:   She is still looking acutely ill. Pale. Cachectic, not in acute respiratory distress. HEENT:  normocephalic and atraumatic. No scleral icterus. PERR  Neck: supple. No Thyromegaly. Lungs: clear to auscultation. No retractions  Cardiac: Sinus bradycardia. Distant heart sounds, No JVD. Abdomen: soft. Nontender. Extremities:  No clubbing, diameter of right leg bigger diameter of left leg. .Cyanosis, or edema x 4. Vasculature: capillary refill < 3 seconds. Palpable dorsalis pedis pulses. Skin:  warm and dry. Lymph:  No supraclavicular adenopathy. Neurologic:   No seizures. Data: (All radiographs, tracings, PFTs, and imaging are personally viewed and interpreted unless otherwise noted).    Sodium 146 potassium 3.6 chloride 102 bicarb 24 BUN 54 creatinine 2.2 anion gap 20 estimated GFR 21 magnesium 6.2 lactic acid 4.8 glucose 240  WBC 21.4 hemoglobin 9.9 platelets 469  Telemetry shows sinus bradycardia   pH 7.42 PCO2 42 PO2 355 bicarb is 27 O2 sats 100  Respiratory respiratory status 12 PIP 15 PEEP 6   CVP 2cm/h2o  MRI following 9/18/2022 menstruated MRI (without contrast, demonstrated    Restricted diffusion in the left frontal, left parietal, right parietal, left posterior temporal and right posterior temporal/occipital, and left occipital lobes and in the right and left cerebellar hemisphere with corresponding diminished signal.  Venous Doppler from 9/18/2022 demonstrated DVT in the distal right femoral vein with a peroneal vein. Also a small focus of acute/subacute DVT mid distal left femoral vein. Seen with multidisciplinary ICU team Yes  Meets Continued ICU Level Care Criteria:    [x] Yes   [] No - Transfer Planned to listed location:  [] HOSPITALIST CONTACTED-      Case and plan discussed with Dr. Shireen Brantley. Electronically signed by Gloria Nation DO  [address]   Patient seen by me including key components of medical care. Case discussed with Dr. Rendon Nicely. Case discussed with resident physician. Family considering parameters of care. Currently DNR CCA. .  Italicized font, if present,  represents changes to the note made by me. CC time 35 minutes. Time was discontiguous. Time does not include procedure. Time does include my direct assessment of the patient and coordination of care. Time represents more than 50% of the time involved with patient care by the [address] [place] [address] team.  Electronically signed by Tomás Méndez.  Shireen Brantley MD.

## 2022-09-19 NOTE — PROGRESS NOTES
Renal Formula; Continuous; 10; Yes; 10; Q 8 hours; 30; 30; Q 4 hours  Current Tube Feeding (TF) Orders:  Feeding Route: Orogastric  Formula: Renal Formula  Schedule: Continuous  Feeding Regimen: Nepro at 10 ml/hr, increasing by 10 ml/hr every 8 hours as tolerated until reach goal rate of 30 ml/hr  Additives/Modulars: None  Water Flushes: Additional free water flushes per Physician  Goal TF & Flush Orders Provides: 1274 kcals, 58 gms protein, 115 gms CHO, 18 gms fiber and 523 ml free water in total volume of 720 ml. Anthropometric Measures:  Height: 5' 4\" (162.6 cm)  Ideal Body Weight (IBW): 120 lbs (55 kg)    Admission Body Weight: 113 lb 15.7 oz (51.7 kg) (9/14; no edema noted)  Current Body Weight: 124 lb 5.4 oz (56.4 kg) (9/18; +1 edema),      Current BMI (kg/m2): 21.3  Usual Body Weight:  (Per EMR: 109 lb 6.4 oz on 8/16/22; 124 lb 9.6 oz on 6/20/22)                       BMI Categories: Normal Weight (BMI 18.5-24. 9)    Estimated Daily Nutrient Needs:  Energy Requirements Based On: Kcal/kg  Weight Used for Energy Requirements:  (53.6kgm on 9/15)  Energy (kcal/day): 9949-1909 kcal/day (20-25 kcal/kg)  Weight Used for Protein Requirements: Current (53.6 kg on 9/15)  Protein (g/day): 53-64 g/day (1-1.2 g/kg) - monitoring renal status  Method Used for Fluid Requirements: Other (Comment)  Fluid (ml/day): per Physician    Nutrition Diagnosis:   Inadequate oral intake related to impaired respiratory function as evidenced by NPO or clear liquid status due to medical condition, intubation    Nutrition Interventions:   Food and/or Nutrient Delivery: Continue NPO, Start Tube Feeding  Nutrition Education/Counseling: No recommendation at this time  Coordination of Nutrition Care: Continue to monitor while inpatient, Interdisciplinary Rounds       Goals:     Goals: Initiate nutrition support, by next RD assessment       Nutrition Monitoring and Evaluation:   Behavioral-Environmental Outcomes: None Identified  Food/Nutrient Intake Outcomes: Enteral Nutrition Intake/Tolerance  Physical Signs/Symptoms Outcomes: Biochemical Data, GI Status, Weight, Skin, Nutrition Focused Physical Findings, Fluid Status or Edema    Discharge Planning:     Too soon to determine     Marilin Barcenas RD, LD  Contact: [phone]

## 2022-09-19 NOTE — PROCEDURES
LONG-TERM EEG-VIDEO [id number]    Patient: Leonel Salcedo  Age: 80 y.o. MRN: [id number]    Referring Physician: Davon Ruvalcaba MD  History: The patient is a 80 y.o. female who presented breakthrough seizure/encephalopathy. This long-term video-EEG monitoring study was performed to determine the nature of the patient's clinical events. The patient is on neuroactive medications.    Leonel Salcedo   Current Facility-Administered Medications   Medication Dose Route Frequency Provider Last Rate Last Admin    clopidogrel (PLAVIX) tablet 75 mg  75 mg Oral Daily Shoshana Trevino MD   75 mg at 09/19/22 0936    0.9 % sodium chloride infusion   IntraVENous Continuous Shoshana Trevino  mL/hr at 09/19/22 1303 Associate Infusion Device at 09/19/22 1303    EPINEPHrine 5 mg in in dextrose 5% 250 ml infusion  1-20 mcg/min IntraVENous Continuous Sudhir Mclean MD 6 mL/hr at 09/19/22 1301 2 mcg/min at 09/19/22 1301    0.9 % sodium chloride infusion   IntraVENous PRN Sudhir Mclean MD        midazolam (VERSED) infusion 100mg/100mL  1-10 mg/hr IntraVENous Continuous Krystle Ebchristine DO        vasopressin 20 Units in dextrose 5 % 100 mL infusion  0.01-0.04 Units/min IntraVENous Continuous Sudhir Mclean MD 12 mL/hr at 09/19/22 1301 0.04 Units/min at 09/19/22 1301    levETIRAcetam (KEPPRA) 500 mg in sodium chloride 0.9 % 100 mL IVPB  500 mg IntraVENous Q12H Ingrid Ramirez PA-C   Stopped at 09/19/22 0828    LORazepam (ATIVAN) injection 1 mg  1 mg IntraVENous Q6H PRN Jesse Dias PA-C        niCARdipine (CARDENE) 25 mg in dextrose 5 % 250 mL infusion  2.5-15 mg/hr IntraVENous Continuous Ingrid Ramirez PA-C   Stopped at 09/19/22 0514    0.9 % sodium chloride infusion   IntraVENous PRN Shoshana Trevino MD        heparin (porcine) injection 4,290 Units  80 Units/kg IntraVENous PRN SHANNON Swain - CNP heparin (porcine) injection 2,140 Units  40 Units/kg IntraVENous PRN Kristen SHANNON Dhaliwal CNP   2,140 Units at 09/19/22 0736    heparin 25,000 units in dextrose 5% 250 mL (premix) infusion  5-30 Units/kg/hr IntraVENous Continuous Kristen SHANNON Dhaliwal CNP 8.6 mL/hr at 09/19/22 1301 16 Units/kg/hr at 09/19/22 1301    pantoprazole (PROTONIX) injection 40 mg  40 mg IntraVENous BID Lex Chaudhary MD   40 mg at 09/19/22 0758    insulin lispro (HUMALOG) injection vial 0-16 Units  0-16 Units SubCUTAneous TID WC Humberto Kincaid,         insulin lispro (HUMALOG) injection vial 0-4 Units  0-4 Units SubCUTAneous Nightly Quintella Bucy, DO        sodium chloride flush 0.9 % injection 5-40 mL  5-40 mL IntraVENous 2 times per day Skye Yap PA-C   10 mL at 09/19/22 0757    sodium chloride flush 0.9 % injection 5-40 mL  5-40 mL IntraVENous PRN Skye Yap PA-C        ondansetron (ZOFRAN-ODT) disintegrating tablet 4 mg  4 mg Oral Q8H PRN Quintella Bucy, DO        Or    ondansetron (ZOFRAN) injection 4 mg  4 mg IntraVENous Q6H PRN Quintella Bucy, DO        polyethylene glycol (GLYCOLAX) packet 17 g  17 g Oral Daily PRN Quintella Bucy, DO        acetaminophen (TYLENOL) tablet 650 mg  650 mg Oral Q6H PRN Quintella Bucy, DO        Or    acetaminophen (TYLENOL) suppository 650 mg  650 mg Rectal Q6H PRN Quintella Bucy, DO        atorvastatin (LIPITOR) tablet 10 mg  10 mg Oral Daily SHANNON Ferraro CNP   10 mg at 09/18/22 2040    levothyroxine (SYNTHROID) tablet 100 mcg  100 mcg Oral Daily SHANNON Ferraro CNP   100 mcg at 09/19/22 0617    fentaNYL (SUBLIMAZE) injection 25 mcg  25 mcg IntraVENous Q1H PRN SHANNON Ferraro CNP   25 mcg at 09/15/22 1306    calcium replacement protocol   Other RX Placeholder SHANNON Ferraro CNP        glucose chewable tablet 16 g  4 tablet Oral PRN Angle Santos APRN - MARY        dextrose bolus 10% 125 mL  125 mL IntraVENous PRN Angle [place], APRN - CNP        Or    dextrose bolus 10% 250 mL  250 mL IntraVENous PRN Angle Vinny [place], APRN - CNP        glucagon (rDNA) injection 1 mg  1 mg SubCUTAneous PRN Angle [place], APRN - CNP        dextrose 10 % infusion   IntraVENous Continuous PRN Angle Vinny [place], APRN - CNP         Technical Description: This is a 21-channel digital EEG recording with time-locked video. Electrodes were placed in accordance with the 10-20 International System of Electrode Placement. Single lead EKG monitoring was included. Baseline EEG Recording:  A formal baseline EEG recording was not obtained. Day 1 - 9/19/22, starting at 12:20    Interictal EEG Samples:  IThe dominant background activity consisted of 4-5 Hz of polymorphic delta and theta activity of 25 to 35 µV lasting for 3 to 4 seconds followed by diffuse attenuation lasting for 4 to 5 seconds. This pattern did not show any state change or reactivity. Prominent bifrontal and bitemporal EMG artifact was seen. Normal sleep pattern was not seen. The EKG channel revealed no abnormalities. Ictal EEG Recording / Patient Events: During this period the patient had no events or seizures. Summary: During this day of recording no events were recorded. This EEG was abnormal. Disorganized and slow background and frequent periods of attenuation suggested severe encephalopathy. No abnormal epileptiform activity was seen. Monitoring was continued in order to record the patient's typical events. The EKG channel revealed no abnormalities. Manny Danielson MD  Diplomate, American Board of Psychiatry and Neurology  Diplomate, American Board of Clinical Neurophysiology  Diplomate, American Board of Epilepsy     Please note this is a preliminary report and updated daily. The final report will have a summary of behavior and electrographic findings with clinical correlation.

## 2022-09-19 NOTE — PROGRESS NOTES
sodium chloride Stopped (09/17/22 1156)    dextrose       LORazepam, 1 mg, Q6H PRN  sodium chloride, , PRN  heparin (porcine), 80 Units/kg, PRN  heparin (porcine), 40 Units/kg, PRN  sodium chloride flush, 5-40 mL, PRN  ondansetron, 4 mg, Q8H PRN   Or  ondansetron, 4 mg, Q6H PRN  polyethylene glycol, 17 g, Daily PRN  acetaminophen, 650 mg, Q6H PRN   Or  acetaminophen, 650 mg, Q6H PRN  fentanNYL, 25 mcg, Q1H PRN  glucose, 4 tablet, PRN  dextrose bolus, 125 mL, PRN   Or  dextrose bolus, 250 mL, PRN  glucagon (rDNA), 1 mg, PRN  dextrose, , Continuous PRN    Lab Data:    Cardiac Enzymes:  No results for input(s): CKTOTAL, CKMB, CKMBINDEX, TROPONINI in the last 72 hours.     CBC:   Lab Results   Component Value Date/Time    WBC 13.3 09/19/2022 05:05 AM    RBC 3.38 09/19/2022 05:05 AM    HGB 10.2 09/19/2022 05:05 AM    HCT 29.6 09/19/2022 05:05 AM     09/19/2022 05:05 AM       CMP:    Lab Results   Component Value Date/Time     09/19/2022 05:05 AM    K 4.3 09/19/2022 05:05 AM    K 3.7 09/17/2022 04:10 AM     09/19/2022 05:05 AM    CO2 31 09/19/2022 05:05 AM    BUN 54 09/19/2022 05:05 AM    CREATININE 2.2 09/19/2022 05:05 AM    GFRAA 27 07/12/2022 09:39 AM    LABGLOM 21 09/19/2022 05:05 AM    GLUCOSE 156 09/19/2022 05:05 AM    CALCIUM 9.7 09/19/2022 05:05 AM       Hepatic Function Panel:    Lab Results   Component Value Date/Time    ALKPHOS 113 09/19/2022 05:05 AM     09/19/2022 05:05 AM     09/19/2022 05:05 AM    PROT 5.1 09/19/2022 05:05 AM    BILITOT 4.0 09/19/2022 05:05 AM    BILIDIR 1.1 09/17/2022 03:49 PM    LABALBU 4.0 09/19/2022 05:05 AM       Magnesium:    Lab Results   Component Value Date/Time    MG 2.1 09/18/2022 10:30 PM       PT/INR:    Lab Results   Component Value Date/Time    INR 1.74 09/19/2022 05:05 AM       HgBA1c:    Lab Results   Component Value Date/Time    LABA1C 5.8 09/14/2022 07:45 PM       FLP:    Lab Results   Component Value Date/Time    TRIG 89 08/16/2022 08:57 PM    HDL 31 08/16/2022 08:57 PM    LDLCALC 32 08/16/2022 08:57 PM       TSH:    Lab Results   Component Value Date/Time    TSH 0.59 05/25/2022 12:10 PM         Assessment:  S/p Massive PE with clot in transit seen in the RA and then in the PA with paradoxical embolization to presumed R MCA - negative CTA head, positive CTA PE s/p cardiac arrest, s/p tPA  Shock Liver - resolving  WEI - oliguric  S/p MitraClip XTW x 1 at A2-P2  Cardiac Tamponade 2/2 probable RA injury with clip advancement s/p pericardial drain  Cardiogenic Shock 2/2 acute increase in afterload with successful MitraClip with V wave from 80 to 25, EF dropped from 35 to 5%, SCAI C, s/p IABP  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  Hx of LAD/Dx PCI, 8/17/22  CKD, stage 3b  NIDDM       Plan:  Neuro - embolic CVA appears recoverable, no anoxic injury  CV - s/p clip, EF stable around 20-25%, Rv function was minimal and recovered post tPA, s/p tamponade with drain - no further drainage or effusion, CVP goal 8-10 mmHg; off pressors, lactate normalized  WEI - oliguric, Cr up to 2.2 she appears dry, CVP 0, will add 0.9 NS  GI - liver recovering, supportive care, will initiate TF today, PPI   Heme - Hgb stable, Plt normalized, start Plavix today  Resp - intubated, minimal vent settings, CXR improved and no congestion, mild cardiomegaly  ID  - WBC is reactive, no fevers  Endo - BS< 180  Will start cardiac medical therapy once we understand Neuro prognosis  Further recommendations based on results and clinical course    Greater than 120 minutes of time was spent managing critical issues, discussing with the family, and coordinating care.       Electronically signed by Gustabo Retana MD on 9/19/2022 at 7:49 AM

## 2022-09-19 NOTE — SIGNIFICANT EVENT
Called to bedside for episode of bradycardia on monitor. Episodes noted intermittently over past hour. Review of telemetry data shows multiple runs of sinus bradycardia with MAP > 65. Qtc prolonged to 550 on monitor. Repeat labs pending will check ionized calcium given recent transfusion and prolonged Qtc. No output noted from pericardial drain. POCUS reveals persistent circumferential pericardial effusion. 50cc of emanuel blood was drawn off following irrigation with 40cc sterile saline. Free flowing blood tinged output observed post irrigation.

## 2022-09-19 NOTE — PROCEDURES
Date: 9/19/2022  Referring physician: Lord Khoa PA-C    Indication  Patient aged 80 y with encephalopathy. EEG done to assess for epileptiform activity. Introduction  This 24 hrs EEG was recorded using the Piictu one band system. Automated seizure detection algorithms were applied. Description  The background consistent of diffuse none reactive polymorphic delta and theta slowing with brief periods of attenuation. No consistent focal slowing or interhemispheric asymmetry was noted. Stage I and stage II sleep were not observed. There were no interictal epileptiform discharges or electrographic seizures. Activations  Hyperventilation was not performed. Intermittent photic stimulation was not performed. Impression  Abnormal awake EEG. The slowing mentioned above suggests moderate to severe non specific encephalopathy. There were multiple events of single lead changes of rapid activity of unclear significance, recommend correlation with regular full leads EEG. No epileptiform discharges were identified. Please note the absence of such activity on this record cannot conclusively rule out an epileptic disorder. If such is still clinically suspected, a repeat study with sleep deprivation and/or prolonged sampling may be helpful. Please note this EEG was meant to screen for emergent condition and is prone to artifact and with some limitations. The interpretation of this EEG result should be taken only with clinical correlation. Ideally regular EEG with full leads should be considered when possible. Irving August MD  Epilepsy Board Certified. Neurology Board Certified.     Electronically Signed

## 2022-09-19 NOTE — PROGRESS NOTES
[facility] Laboratory Technician Worksheet      EEG Date: 2022    Name: Loni Castaneda   : 1940   Age: 80 y.o. SEX: female    ROOM: 15 MRN: [id number]           CSN: [id number]      Ordering Provider: desi MARTINS Number: [id number] Time of Test:  5825    Hand: unkown   Sedation: no    H.V. Done: No  age protcol  Photic: Yes    Sleep: No  Drowsy: No   Sleep Deprived: No    Seizures observed: seizure like activity : body tremors, and legs constricting inward noted    Mentality: unresponsive      Clinical History: body tremors, cardiac arrest this am,  mae, PE, mri  Impression       1. Areas of restricted diffusion in the left frontal, left parietal, right parietal, left posterior temporal, right posterior temporal/occipital, left occipital lobes and in the right and left cerebellar hemispheres with corresponding diminished signal    intensity on the ADC map consistent with acute infarcts. 2. Mild atrophy and dilatation of the lateral ventricles. 3. Small infarct in the right thalamus. 4. Fluid in the ethmoid, mastoid and sphenoid sinuses bilaterally and in the left maxillary sinus. 5. Fluid within the nasopharynx.        Past Medical History:       Diagnosis Date    MAE (acute kidney injury) ([facility] 2022    Aortic stenosis, severe 2022    Diabetes mellitus ([facility] [address]     Dilated cardiomyopathy ([address] 2022    Hypertension     Hypothyroidism 1968    Mild mitral stenosis 2022    Mild-Severe tricuspid valve regurgitation 2022    Severe pulmonary hypertension ([address] 2022       Scheduled Meds:   clopidogrel  75 mg Oral Daily    levETIRAcetam  500 mg IntraVENous Q12H    pantoprazole  40 mg IntraVENous BID    insulin lispro  0-16 Units SubCUTAneous TID WC    insulin lispro  0-4 Units SubCUTAneous Nightly    sodium chloride flush  5-40 mL IntraVENous 2 times per day    atorvastatin  10 mg Oral Daily    levothyroxine  100 mcg Oral Daily    calcium replacement protocol   Other RX Placeholder     Continuous Infusions:   sodium chloride 100 mL/hr at 09/19/22 1303    EPINEPHrine 2 mcg/min (09/19/22 1301)    sodium chloride      midazolam      vasopressin (Septic Shock) infusion 0.04 Units/min (09/19/22 1301)    niCARdipine Stopped (09/19/22 0514)    sodium chloride      heparin (PORCINE) Infusion 16 Units/kg/hr (09/19/22 1301)    dextrose       PRN Meds:.sodium chloride, LORazepam, sodium chloride, heparin (porcine), heparin (porcine), sodium chloride flush, ondansetron **OR** ondansetron, polyethylene glycol, acetaminophen **OR** acetaminophen, fentanNYL, glucose, dextrose bolus **OR** dextrose bolus, glucagon (rDNA), dextrose    Technician: Ant Cordero 9/19/2022

## 2022-09-19 NOTE — PROGRESS NOTES
[facility] THERAPY MISSED TREATMENT NOTE  [facility] ICU 4D      Date: 2022  Patient Name: Saad Obrien        MRN: [id number]    : 1940  (80 y.o.)    REASON FOR MISSED TREATMENT:  Attempted to see patient at [phone] for skilled interventions. Per chart review, patient with significant medical decline since 22 (date of initial MBS completion) s/t syncopal episode and later cardiac arrest. RN Jef Lee reporting patient with needs to maintain oral intubation, no plans for extubation at date. Will closely monitor medical/respiratory status, completing re-assessments as indicated. MRI brain 22  Impression       1. Areas of restricted diffusion in the left frontal, left parietal, right parietal, left posterior temporal, right posterior temporal/occipital, left occipital lobes and in the right and left cerebellar hemispheres with corresponding diminished signal    intensity on the ADC map consistent with acute infarcts. 2. Mild atrophy and dilatation of the lateral ventricles. 3. Small infarct in the right thalamus. 4. Fluid in the ethmoid, mastoid and sphenoid sinuses bilaterally and in the left maxillary sinus. 5. Fluid within the nasopharynx.            Samuel Padgett M.A., [address] [place] [address]

## 2022-09-19 NOTE — SIGNIFICANT EVENT
At about 10:49 am, Code blue was called to the patient's room. She was in cardiogenic shock and asystole. Chest compression was started. She had about 40 cc of pericardial blood withdrawn form pericardial sac. She had a pulse. Epinephrine was given. She was shocked twice. She achieved ROSC. She also received on unit of PRBC. Her pericardial drain was connected to an atrium. She was placed on vasopressin. Her code status was changed to [facility] at request of her POA who is also her sister.

## 2022-09-19 NOTE — PLAN OF CARE
Progressing  Flowsheets (Taken 9/19/2022 1333)  Nutrient intake appropriate for improving, restoring, or maintaining nutritional needs:   Assess nutritional status and recommend course of action   Monitor oral intake, labs, and treatment plans   Recommend appropriate diets, oral nutritional supplements, and vitamin/mineral supplements   Recommend, monitor, and adjust tube feedings and TPN/PPN based on assessed needs     Problem: Neurosensory - Adult  Goal: Achieves stable or improved neurological status  Outcome: Not Progressing  Flowsheets (Taken 9/19/2022 1333)  Achieves stable or improved neurological status:   Assess for and report changes in neurological status   Monitor temperature, glucose, and sodium. Initiate appropriate interventions as ordered     Problem: ABCDS Injury Assessment  Goal: Absence of physical injury  9/19/2022 1333 by Federica Amanda RN  Outcome: Progressing  Flowsheets (Taken 9/19/2022 1333)  Absence of Physical Injury: Implement safety measures based on patient assessment     Problem: Safety - Adult  Goal: Free from fall injury  9/19/2022 1333 by Federica Amanda RN  Outcome: Progressing  Flowsheets (Taken 9/19/2022 1333)  Free From Fall Injury:   Instruct family/caregiver on patient safety   Based on caregiver fall risk screen, instruct family/caregiver to ask for assistance with transferring infant if caregiver noted to have fall risk factors     Problem: Skin/Tissue Integrity  Goal: Absence of new skin breakdown  Description: 1. Monitor for areas of redness and/or skin breakdown  2. Assess vascular access sites hourly  3. Every 4-6 hours minimum:  Change oxygen saturation probe site  4. Every 4-6 hours:  If on nasal continuous positive airway pressure, respiratory therapy assess nares and determine need for appliance change or resting period.   9/19/2022 1333 by Federica Amanda RN  Outcome: Progressing  Note: No new red open area   Care plan reviewed with patient's

## 2022-09-19 NOTE — PROGRESS NOTES
Sister Chai Loja took all of patient's belongings home including her purse, clothes, shoes, and telephone. Patient's glasses remains at the bedside.

## 2022-09-19 NOTE — PROGRESS NOTES
After meeting with Alvina's sister, brother and sister in law, the doctor came in to talk with them. 09/19/22 1440   Encounter Summary   Encounter Overview/Reason  Spiritual/Emotional Needs   Service Provided For: Patient and family together   Referral/Consult From: [facility] Family members  (brother, ssiter and sister in law)   Last Encounter  09/19/22   Complexity of Encounter Low   Encounter    Type Follow up   Spiritual/Emotional needs   Type Spiritual Support   Care Plan:  Continue spiritual and emotional care for patient and family. Including prayers.

## 2022-09-19 NOTE — PROGRESS NOTES
Neurology Progress Note    Date:9/19/2022       ES:[id number]  Patient Name:Alvina Holman     YOB: 1940     Age:82 y.o. Chief Complaint: stroke alert      Subjective     Carter Roman is a 80 y.o. female with a history of aortic stenosis, diabetes mellitus, hypertension, hypothyroidism, and mitral regurgitation who is currently admitted following a MitraClip procedure. Yesterday, the patient was a stroke alert activation due to sudden onset neurologic changes. She had rightward gaze deviation and left-sided facial droop. She was taken emergently for a CT and CTA were both of which were negative for acute findings. Her symptoms spontaneously seem to resolve while in the CT scanner. She was taken to MRI and was about to undergo an MRI of her brain when she had a cardiac arrest.  She underwent multiple rounds of CPR and defibrillation and was found to have a massive pulmonary embolism which was imaged on bedside ultrasound. She was given tPA with improvement. Currently, she remains intubated but is not on any sedation. She is not requiring pressor support at this time. She is not moving spontaneously, but will withdraw her extremities to noxious stimuli. The patient went down to MRI earlier today and had an episode of seizure-like activity. She had 4 superior gaze deviation and tonic activity of the left upper extremity. Upon arriving back to the ICU, she had a second episode of similar movements. These lasted for 30 seconds each. Interval history 9/19/22: No further episodes of seizure activity overnight. She is not following commands or moving spontaneously. She did suffer another cardiac arrest earlier today. No fasciculations noted following this. However, she was hooked up to continuous EEG and thus far no electrographic seizures have been noted.     Review of Systems   Review of Systems   Unable to perform ROS: Intubated   Medications   Scheduled Meds: levETIRAcetam  500 mg IntraVENous Q12H    pantoprazole  40 mg IntraVENous BID    insulin lispro  0-16 Units SubCUTAneous TID WC    insulin lispro  0-4 Units SubCUTAneous Nightly    sodium chloride flush  5-40 mL IntraVENous 2 times per day    atorvastatin  10 mg Oral Daily    levothyroxine  100 mcg Oral Daily    calcium replacement protocol   Other RX Placeholder     Continuous Infusions:    niCARdipine Stopped (09/19/22 0514)    sodium chloride      heparin (PORCINE) Infusion 14 Units/kg/hr (09/19/22 0600)    sodium chloride Stopped (09/17/22 1156)    dextrose       PRN Meds: LORazepam, sodium chloride, heparin (porcine), heparin (porcine), sodium chloride flush, ondansetron **OR** ondansetron, polyethylene glycol, acetaminophen **OR** acetaminophen, fentanNYL, glucose, dextrose bolus **OR** dextrose bolus, glucagon (rDNA), dextrose  Medications Prior to Admission:   No current facility-administered medications on file prior to encounter. Current Outpatient Medications on File Prior to Encounter   Medication Sig Dispense Refill    sacubitril-valsartan (ENTRESTO) 24-26 MG per tablet Take 1 tablet by mouth 2 times daily (Patient taking differently: Take 0.5 tablets by mouth 2 times daily) 180 tablet 3    clopidogrel (PLAVIX) 75 MG tablet Take 1 tablet by mouth daily 90 tablet 3    metoprolol succinate (TOPROL XL) 25 MG extended release tablet Take 1 tablet by mouth daily 90 tablet 3    nitroGLYCERIN (NITROSTAT) 0.4 MG SL tablet up to max of 3 total doses. If no relief after 1 dose, call 911. 25 tablet 3    atorvastatin (LIPITOR) 10 MG tablet Take 1 tablet by mouth in the morning.  90 tablet 3    aspirin EC 81 MG EC tablet Take 1 tablet by mouth daily 30 tablet 3    levothyroxine (SYNTHROID) 100 MCG tablet Take 1 tablet by mouth Daily 30 tablet 3    meclizine (ANTIVERT) 25 MG CHEW Take 12.5 mg by mouth 3 times daily as needed (dizziness)      acetaminophen (TYLENOL) 325 mg tablet Take 650 mg by mouth every 6 hours as needed for Pain      Multiple Vitamins-Minerals (VISION VITAMINS PO) Take 1 tablet by mouth daily Vision Multi 50+       Past History    Past Medical History:   has a past medical history of WEI (acute kidney injury) ([address] Aortic stenosis, severe, Diabetes mellitus ([address] Dilated cardiomyopathy ([address] Hypertension, Hypothyroidism, Mild mitral stenosis, Mild-Severe tricuspid valve regurgitation, and Severe pulmonary hypertension ([address] Social History:   reports that she has never smoked. She has been exposed to tobacco smoke. She has never used smokeless tobacco. She reports that she does not currently use alcohol. She reports that she does not use drugs. Family History:   Family History   Problem Relation Age of Onset    Hypertension Mother     Diabetes Mother     Heart Disease Mother     Lung Cancer Father     Heart Disease Sister     Colon Cancer Sister        Physical Examination      Vitals:  /65   Pulse 84   Temp 97.5 °F (36.4 °C) (Bladder)   Resp 24   Ht 5' 4\" (1.626 m)   Wt 124 lb 5.4 oz (56.4 kg)   SpO2 100%   BMI 21.34 kg/m²   Temp (24hrs), Av.8 °F (36.6 °C), Min:97.3 °F (36.3 °C), Max:98.4 °F (36.9 °C)      I/O (24Hr): Intake/Output Summary (Last 24 hours) at 2022 0725  Last data filed at 2022 0600  Gross per 24 hour   Intake 1167.83 ml   Output 2155 ml   Net -987.17 ml         Physical Exam  Vitals reviewed. Constitutional:       General: She is not in acute distress. Appearance: She is ill-appearing. HENT:      Head: Normocephalic and atraumatic. Right Ear: External ear normal.      Left Ear: External ear normal.      Nose: Nose normal.      Mouth/Throat:      Mouth: Mucous membranes are moist.      Pharynx: No posterior oropharyngeal erythema. Comments: Endotracheal and orogastric tubes in place. Cardiovascular:      Rate and Rhythm: Normal rate and regular rhythm. Heart sounds: Normal heart sounds. No murmur heard.   Pulmonary: Effort: Pulmonary effort is normal. No respiratory distress. Breath sounds: Normal breath sounds. No wheezing. Comments: Intubated and mechanically ventilated. Abdominal:      General: Bowel sounds are normal.      Palpations: Abdomen is soft. Tenderness: There is no abdominal tenderness. Musculoskeletal:         General: Normal range of motion. Right lower leg: No edema. Left lower leg: No edema. Skin:     General: Skin is warm. Findings: No rash. Psychiatric:      Comments: Unresponsive     Neurologic Exam     Mental Status   Level of consciousness: responsive to painful stimuli    Cranial Nerves   Pupils equal round and reactive. Labs/Imaging/Diagnostics   Labs:  CBC:  Recent Labs     09/18/22 0445 09/18/22  1220 09/18/22  2045 09/19/22  0215 09/19/22  0505   WBC 12.8* 10.7  --   --  13.3*   RBC 2.47* 2.98*  --   --  3.38*   HGB 7.3* 8.9* 9.8* 9.9* 10.2*   HCT 22.1* 25.8* 27.7* 28.8* 29.6*   MCV 89.5 86.6  --   --  87.6   PLT 71* 89*  --   --  138     CHEMISTRIES:  Recent Labs     09/17/22 1549 09/17/22 2015 09/18/22 0445 09/18/22  1220 09/18/22  2230 09/19/22  0505    145 146* 146* 147* 147*   K 3.7 3.8 3.6 3.6 2.9* 4.3   CL 99 98 99 100 101 104   CO2 19* 18* 25 28 30 31   BUN 31* 36* 43* 48* 52* 54*   CREATININE 1.4* 1.4* 1.8* 1.8* 2.1* 2.2*   GLUCOSE 162* 112* 154* 165* 157* 156*   PHOS 8.2*  --   --   --   --   --    MG 2.8* 2.6* 2.4  --  2.1  --      COAGULATION STUDIES:  Recent Labs     09/17/22 1549 09/19/22  0505   INR 2.57* 1.74*   APTT 110.3*  --      LIVER PROFILE:  Recent Labs     09/17/22  1549 09/17/22 2015 09/18/22 0445 09/18/22  1220 09/19/22  0505   *   < > 1,658* 1,322* 714*   *   < > 509* 494* 425*   BILIDIR 1.1*  --   --   --   --    BILITOT 2.4*   < > 5.5* 5.3* 4.0*   ALKPHOS 187*   < > 106 105 113    < > = values in this interval not displayed.      CHOLESTEROL AND A1C:  Recent Labs     09/14/22  1945   LABA1C 5.8 Imaging Last 24 Hours:  XR CHEST PORTABLE    Result Date: 9/18/2022  Chest X-ray: 1 view. Indication: Shortness of breath. Comparison: CR,SR - XR CHEST PORTABLE - 09/17/2022 03:48 PM EDT  CT,KOSR - CTA CHEST W WO CONTRAST - 09/17/2022 02:20 PM EDT Findings: Endotracheal tube tip 4.1 cm above the geronimo. Left subclavian catheter with tip in the SVC. Enteric tube with tip in the mid stomach, new since the prior study. Pericardial catheter. Mild interstitial edema, decreased. Left pleural effusion and atelectasis/infiltrate. The cardiac silhouette is enlarged and stable in size. Bony thorax is grossly intact. Impression: Decrease interstitial edema. Left pleural effusion and atelectasis/infiltrate, similar to previous study. This document has been electronically signed by: Aiden Jimenez MD on 09/18/2022 02:42 AM    XR CHEST PORTABLE    Result Date: 9/17/2022  PROCEDURE: XR CHEST PORTABLE CLINICAL INFORMATION: cvc COMPARISON: 9/15/2022 TECHNIQUE: A single mobile view of the chest was obtained. 1. Marked cardiomegaly. Coronary artery stents are present. A pericardial drainage catheter is in place. ET tube tip 3 cm proximal to the geronimo. The previously noted NG tube has been removed. Left subclavian line is present with tip in superior vena cava. 2. Moderate interstitial infiltrates involving both lungs relatively diffusely, consistent with interstitial pneumonia/edema. Appearance of chest has worsened since prior study. Questionable small effusion left side. **This report has been created using voice recognition software. It may contain minor errors which are inherent in voice recognition technology. ** Final report electronically signed by Dr. Arnoldo Su on 9/17/2022 4:09 PM    VL DUP LOWER EXTREMITY VENOUS BILATERAL    Result Date: 9/18/2022  PROCEDURE: VL DUP LOWER EXTREMITY VENOUS BILATERAL CLINICAL INFORMATION: Pulmonary emboli. COMPARISON: No prior study.  TECHNIQUE: Multiple grayscale and color flow images of the major veins of both lower extremities were obtained from the level of the groin to the level of the ankle. Multiple compression and augmentation maneuvers were performed and spectral Doppler waveforms were generated. .. FINDINGS: RIGHT LOWER EXTREMITY: There is no flow and noncompressibility involving the distal right femoral vein and right peroneal veins, each filled with hypoechoic acute appearing thrombus. All of the other deep veins of the right lower extremity are widely patent with normal flow and normal compressibility. LEFT LOWER EXTREMITY: There is partial flow and noncompressibility of the mid/distal femoral vein which contains a moderate amount of hypoechoic acute/subacute appearing mural thrombus. There is also some mild mural thrombus in the posterior tibial veins  which is chronic in appearance. All of the other  deep veins of the left lower extremity are widely patent with normal flow and normal compressibility. 1. Findings of acute deep venous thrombosis involving the distal right femoral vein and peroneal veins, in addition to a small focus of acute/subacute deep venous thrombosis involving the mid/distal left femoral vein. 2. Mild chronic findings involving the left posterior tibial veins. **This report has been created using voice recognition software. It may contain minor errors which are inherent in voice recognition technology. ** Final report electronically signed by Dr. Chan Dyer on 9/18/2022 3:17 PM    MRI BRAIN WO CONTRAST    Result Date: 9/18/2022  PROCEDURE: MRI BRAIN WO CONTRAST CLINICAL INFORMATION stroke alert. rightward gaze deviation. left sided facial droop. cardiac arrest.. COMPARISON: CT scan of the brain and CTA angiogram dated 17th of September 2022.  TECHNIQUE: Multiplanar and multiple spin echo MRI images were obtained of the brain without contrast. FINDINGS: There are areas of restricted diffusion in the left frontal, left parietal, right parietal, left posterior temporal, right posterior temporal/occipital, left occipital lobes, in the right and left cerebellar hemispheres. There is corresponding diminished  signal intensity on the ADC map consistent with acute infarcts. There is a punctate area of restricted diffusion in the right thalamus. The brain volume is slightly reduced. There is a mild amount of signal hyperintensity on the FLAIR and T2-weighted sequences in the white matter of the brain. This is consistent with mild severity chronic small vessel ischemic changes. There are no intra-or extra-axial collections. There is mild dilatation of the lateral ventricles. There is no midline shift or mass effect. There is mineralization in the medial aspects of the basal ganglia bilaterally. No other areas of susceptibility artifact are present. The major intracranial vascular flow voids are present. The midline craniocervical junction structures are normal.  The pituitary gland and brainstem are normal. There is fluid in the ethmoid, mastoid and sphenoid sinuses bilaterally and in the left maxillary sinus. There is fluid within the nasopharynx. 1. Areas of restricted diffusion in the left frontal, left parietal, right parietal, left posterior temporal, right posterior temporal/occipital, left occipital lobes and in the right and left cerebellar hemispheres with corresponding diminished signal intensity on the ADC map consistent with acute infarcts. 2. Mild atrophy and dilatation of the lateral ventricles. 3. Small infarct in the right thalamus. 4. Fluid in the ethmoid, mastoid and sphenoid sinuses bilaterally and in the left maxillary sinus. 5. Fluid within the nasopharynx. **This report has been created using voice recognition software. It may contain minor errors which are inherent in voice recognition technology. ** Final report electronically signed by DR Britt Pelayo on 9/18/2022 11:04 AM       Assessment and Plan:        1.   Bilateral anterior and posterior circulation acute infarcts, likely secondary to paradoxical emboli in the setting of DVT/PE with iatrogenic atrial septal defect  Imaging  CT revealed no acute findings  CTA of head and neck revealed no significant stenosis or LVO. No need for carotid ultrasound. MRI of brain without contrast revealed bilateral anterior and posterior circulation strokes. Full read above. Stat CT head is needed if the patient develops new-onset altered mental status, a severe headache, or new-onset neurologic deficit  Risk factors and medications  Hypertension management with goal blood pressure of <140/90 unless otherwise specified. Keep well hydrated. Initiate normal saline at 75 ml/hr as needed. Patient currently anticoagulated with heparin in light of massive PE. HgbA1C 5.8 on 9/14/2022. If the patient has diabetes, recommend tight control of A1c with goal of <7.0 if attainable. LDL 32 on 8/16/2022. LDL goal of 45-70. Continue Lipitor 10 mg. Smoking and alcohol cessation when applicable. Provide stroke eduction for individualized risk factors. Other recommendations  Dysphagia screen prior to oral intake  PT/OT/SLP Consult when appropriate. NIHSS every shift. Neuro checks per unit unless otherwise specified. Head of bed 45 degree. 2.  Seizure activity  Continuous EEG in place. No electrographic seizures. Does show severe encephalopathy. Continue Keppra 500 mg twice daily. IV Ativan as needed for acute seizures. Order placed for 1 mg of Ativan IV as needed. Call with any additional seizure activity. This case was discussed with Dr. Maggi Holt and he is in agreement with the assessment and plan.     Electronically signed by Karena Foss PA-C on 9/19/22 at 3:52 PM EDT

## 2022-09-19 NOTE — PROGRESS NOTES
[facility] [address]  PHYSICAL THERAPY MISSED TREATMENT NOTE  [facility] ICU 4D    Date: 2022  Patient Name: Bandar Bishop        MRN: [id number]   : 1940  (80 y.o.)  Gender: female   Referring Practitioner: Mariano Valenzuela MD  Diagnosis: Mitral leaflet abnormality         REASON FOR MISSED TREATMENT:  Missed Treat. Per OT discussion with staff pt is only responding to painful stimuli so not appropriate for early mobility. Will hold today and check back as able.

## 2022-09-19 NOTE — PROGRESS NOTES
2138 Episode of bradycardia noted on monitor. At bedside. Patient's blood pressure was maintained with MAP >65. Notable prolonged Qtc. Dr. Sherlyn Muniz called to bedside. 2148 Intermittent episodes of bradycardia continued. Dr. Sherlyn Muniz performing bedside ultrasound at this time. 2150 Dr. Sherlyn Muniz irrigating pericardial drain with 40cc of normal saline. 50cc of emanuel blood drawn off from drain following this irrigation. Free flowing blood tinged output noted after manual irrigation. 2230 Ordered labs sent at this time. 2234 EKG obtained and Dr. Sherlyn Muniz updated. [address] resulted. K+ 2.9. Relayed to Dr. Sherlyn Muniz. See orders.

## 2022-09-20 NOTE — PROGRESS NOTES
[address] [facility] THERAPY MISSED TREATMENT NOTE  [facility] ICU 4D      Date: 2022  Patient Name: Jolie Joseph        MRN: [id number]    : 1940  (80 y.o.)    REASON FOR MISSED TREATMENT:  ST attempted to see patient this AM for skilled ST services. Per discussion with CANDELARIO Storey, patient continues to be intubated at this time with no plans for extubation. ST to re-attempt at a later date/time as patient is medically appropriate and available.      [facility]) Narcisa Fowler M.A., [address]

## 2022-09-20 NOTE — PROGRESS NOTES
09/20/22 0830   Encounter Summary   Encounter Overview/Reason  Spiritual/Emotional Needs   Service Provided For: Patient   Referral/Consult From: Lucas   Last Encounter  09/20/22  (N/R)   Complexity of Encounter Low   Begin Time 0825   End Time  0830   Total Time Calculated 5 min   Encounter    Type Follow up   Spiritual/Emotional needs   Type Spiritual Support   Assessment/Intervention/Outcome   Assessment Unable to assess   Intervention Prayer (assurance of)/[place]   In my encounter with the  80 yr old patient, I attempted to see the patient, but they were unresponsive at this time. No family was present in the room. I offered a prayer at the pt's side. A  will attempt to see the patient at a later time as a follow up. The pt was admitted due to mitral leaflet abnormality.

## 2022-09-20 NOTE — CARE COORDINATION
9/20/22, 2:55 PM EDT    DISCHARGE ON [address] day: 6  Location: 4D-15/015-A Reason for admit: Mitral leaflet abnormality [Q23.9]  Cardiac tamponade [I31.4]   Procedure:   9/14 MitraClip XTW x 1; Attempted placement of second clip, however, clip was against interatrial septum and she developed acute cardiac tamponade; Immediate pericardiocentesis s/p 1700 cc removed  9/14 IABP placed  9/15 IABP removed  9/15 Extubated  9/16 MBS: No laryngeal penetration or aspiration of barium  9/16 Code stroke  9/16 Reintubated  9/16 CT Head:   1. Image quality was slightly degraded by patient motion artifact. 2. Mild atrophy and dilatation of the lateral ventricles. 3. Diminished attenuation in the white matter most likely representing ischemic changes. 4. Atherosclerotic calcification the cavernous segments of both internal carotid arteries. 5. Otherwise negative noncontrast CT scan of the brain. .      9/16 CTA Neck/Head:   1. 50% narrowing at the origin of the right internal carotid artery. No significant hemodynamic stenosis in the right common carotid artery. 2. 30% narrowing at the origin of left internal carotid artery. No significant hemodynamic stenosis in the left common carotid artery. 3. There is antegrade flow in the right and left vertebral arteries. 4. There is atherosclerotic calcification in the cavernous segments of both internal carotid arteries. There is no evidence of severe stenosis. 5. Otherwise negative CTA of the [place]. 6. Endotracheal and nasogastric tubes in place. 7. Bilateral pleural effusions. Areas of abnormal density in the left and right upper lobes. 8. Cervical spondylosis     9/16 CODE BLUE - multiple times in MRI  9/17 CVC Left subclavian  9/17 CTA Chest:   1. Small filling defects in the right lower lobe pulmonary arterial branches consistent with pulmonary emboli. No signs of right ventricular strain.    2.. Cardiomegaly. Possible pericardial drainage catheter in place. 3. Bilateral pleural effusions. 4. Areas of abnormal density in the right left lower lobe consistent with infiltrates. 5. Small mediastinal lymph nodes present     9/17 BANDAR/DCCV - successful; EF 15-20%  9/18 MRI Brain:   1. Areas of restricted diffusion in the left frontal, left parietal, right parietal, left posterior temporal, right posterior temporal/occipital, left occipital lobes and in the right and left cerebellar hemispheres with corresponding diminished signal    intensity on the ADC map consistent with acute infarcts. 2. Mild atrophy and dilatation of the lateral ventricles. 3. Small infarct in the right thalamus. 4. Fluid in the ethmoid, mastoid and sphenoid sinuses bilaterally and in the left maxillary sinus. 5. Fluid within the nasopharynx. 9/18 BLE Venous dopplers:   1. Findings of acute deep venous thrombosis involving the distal right femoral vein and peroneal veins, in addition to a small focus of acute/subacute deep venous thrombosis involving the mid/distal left femoral vein. 2. Mild chronic findings involving the left posterior tibial veins     9/19 Ceribell EEG: suggests moderate to severe non specific encephalopathy; no seizures noted  9/19 Continuous EEG: day one - no seizures noted; severe encephalopathy  9/19 CODE BLUE  9/20 CXR:   1. Improved pulmonary edema. 2. No change in cardiac silhouette enlargement. The pericardial drain has    withdrawn as detailed above. Barriers to Discharge: Patient coded on 9/16; stood from chair, went to get back into bed and became very weak, unstable, and hypotensive. Collapsed into bed and became unresponsive. Left facial droop noted with right gaze and BUE & BLE weakness. Code stroke called. Respiratory status declined and was intubated. Pressors started for hypotension. Taken for CT head and CTA head/neck. No emboli or thrombi noted. Taken to MRI.  In MRI became bradycardic then CHB/asystole. Coded multiple times in MRI. Echo done at bedside and revealed PPE in right atrium. TPA given. Repeat Echo demonstrated PE had dissipated. CTA chest consistent with small PE in RLL pulmonary arterial branches. Transferred to ICU. BANDAR/CV on 9/17 was successful. MRI on 9/18 with multiple small strokes and had 2 seizures. LLE DVTs noted. Ceribell placed. Code Blue on 9/19 - asystole. Received shock x2 before ROSC. Approx 40 ml pericardial blood withdrawn from pericardial sac. 1 PRBC given. Code status changed to [facility] per sister/POA. Early this morning had worsening hypotension with sudden drop in BP from 120/46 to 47/36 and CVP increased from 15 to 26. Requiring multiple vasopressors. PT/OT signed off. Patient now [facility]; plan for terminal extubation. PCP: Ludmila Prakash MD  Readmission Risk Score: 19.1%  Patient Goals/Plan/Treatment Preferences: From home with cousin. Plan pending clinical course.

## 2022-09-20 NOTE — PROGRESS NOTES
1720 patient bp 61/32 map 40  0313 cuff bp taken, 47/35 map 37  0314 epi restarted, see [facility]  0315 Dr Anthony Preciado at bedside  0316 epi increased, medications titrated per Dr Anthony Preciado, see Ariane Preciado verbal order to start levo  0321 levo started at 12 per Dr Anthony Preciado, see [facility] for titrations  Bree Preciado and Dr Ghazala Beckman doing bedside ultrasound  0330 blood work sent to lab  0179 verbal order to start vaso at 0.04 per Dr Saima Chambers update to Dr Geo Miller via telephone  5765 verbal orders for barrett per Dr Apolinar Aguayo

## 2022-09-20 NOTE — DEATH NOTES
[address] [place] [address]  Notice of Patient Passing      Patient Name- [address] Number- [de-identified]   Attending Physician- Basil Mello MD    Admitted on-2022  4:43 AM     On 2022 at [phone] patient was found in 4D15 with:   Absence of vital signs. Absence of neurological response. Confirmed time of death at [phone]. Physician or On-call Physician notified of time of death- yes    Family present at time of death- yes, multiple. Spiritual care present at time of death- no    Physician was notified and orders were obtained to release the body. Post-Mortem documentation completed; form printed, signed, and given to admitting.     Steven Snyder RN RN Nursing Supervisor/ Manager  22   3:40 PM      Name of  Home: [facility] Phone Number: [phone]    Who Will Sign Death Certificate:     [x] Basil Mello MD

## 2022-09-20 NOTE — PROGRESS NOTES
1506 time of death declared by Aniyah Swan CNP. PEA on monitor. Absence of heart tones noted. House supervisor notified.

## 2022-09-20 NOTE — PROGRESS NOTES
[address]  PHYSICAL THERAPY MISSED TREATMENT NOTE  [facility] ICU 4D    Date: 2022  Patient Name: Layla Ford        MRN: [id number]   : 1940  (80 y.o.)  Gender: female   Referring Practitioner: Ginger Merlos MD  Diagnosis: Mitral leaflet abnormality         REASON FOR MISSED TREATMENT:  Missed Treat. Pt decline in medical status and not appropriate for PT at this time per RN. Will discontinue PT and please reorder when appropriate.

## 2022-09-20 NOTE — PROGRESS NOTES
CRITICAL CARE PROGRESS NOTE      Patient:  Mariaelena Rey    Unit/Bed:4D-15/015-A  YOB: 1940  MRN: [id number]   PCP: Yaron Collins MD  Date of Admission: 9/14/2022    Chief Complaint:- cardiac tamponade     Assessment and Plan:    Acute Hypoxic Respiratory Failure: Patient originally intubated due to Mitraclip procedure and was extubated on 9/15. On 9/17, patient developed cardiac arrest/ischemic stroke secondary to paradoxical embolization from acute PE and was intubated at this time, remains so. Maintain O2 sats > 90%. Lung protective strategies and wean as tolerated. Ischemic stroke (9/17): Etiology due to paradoxical embolization from acute massive PE with sub-clot transitioning across ASD from mitral clip procedure. MRI brain without contrast demonstrates areas of restricted diffusion in the left frontal, left parietal, left posterior temporal, right posterior temporal/occipital lobes. Patient remains off sedation and has minimal to no neurological response. Neurology following. Cardiac Arrest: Cardiac arrest on 9/17 and 9/19. Secondary to massive PE with decreased RV wall motion and LVEF 15-50%. tPA was administered on 9/17 and ROSC was obtained. ACLS protocols done. Pulmonary embolism/DVT (9/17): Massive PE with right heart strain identified on echocardiogram completed 9/17/22 and tPA was administered. CTA chest reported small distal PE. Continue heparin drip, now therapeutic. Cardiac Tamponade: Patient underwent Mitral Clip procedure 9/14, and developed cardiac tamponade. Pericardial drain was placed. Patient noted to have left ventricular dysfunction with low EF. Intra-aortic balloon pump to augment support placed (9/14), removed (9/15) successfully. Overnight (9/19-9/20) drained 1330 cc fluid, drain flushed. Cardiology following. Cardiogenic shock: Had pericardial effusion resulting in tamponade.  9/17 patient coded, was found to have ischemic stroke, ACLS protocols were followed, PE causing right heart strain noted on ECHO. 9/18, patient became bradycardic, , developed V. fib, and was shocked. Lactic Acid elevated, will trend. Cardiology following. Torsades Kentrell Pointes (9/19):  Associated with initial bradycardia resulting in code on 9/19/22. Epinephrine drip contributed to dysrhythmia. S/P magnesium. Discussed with nursing staff to transition away from epinephrine to vasopressin. Continuous tele. Lactic Acidosis: Secondary to cardiogenic shock/cardiac arrest. Will trend. Shock Liver: Secondary to shock/cardiac arrest. Liver enzymes improving. Monitor labs. Leukocytosis: Likely reactive, remains afebrile. Will monitor. Normocytic Anemia: Pericardial drain continue to have good output, 130 cc overnight. No other acute signs of active bleeding. Will monitor, transfuse to maintain Hgb > 8. New Onset Afib with RVR (9/17): Patient noted to go into Afib with RVR on tele, EKG confirmed. Cardiology notified. Hypotension: Patient BP's had stabilized prior to code blue/stroke on 9/17. Due to cardiogenic shock and ventricular fibrillation, has been hypotensive. Currently requiring Levo, Noe, Vaso, and Epi. Will wean Epi first as tolerated. Maintain MAP > 65. WEI on CKD Stage 3b: Worsened secondary to shock. Cr stable at 2.2, was as low as 1.2. Continues to make urine. Will monitor labs and strict I/O's. Seizure Activity: No electrographic seizures noted, severe encephalopathy present. Continue Keppra and PRN Ativan. Neurology following. Severe Mitral Regurgitation: s/p MitraClip (9/14). Cardiology following. Multivessel Disease/Severe Pulm HTN: Patient had undergone cardiac cath on 6/9/2022. Significant multivessel disease was noted with 100% RCA.   100% left circumflex with large OM1.  60 to 70% in the mid LAD and 90% blockage in the first diagonal. She was taken to the Cath Lab on 8/17/2022 per Dr. Rose Marie Reid and a T stent was placed linking the LAD to the diagonal. Successful LAD diagonal bifurcation PCI, RCA , and Left Circumflex  done at that time. Noted to have severe symptomatic mitral regurgitation and severe pulmonary HTN. Hypovolemic Shock: s/p Mitraclip procedure with tamponade. Patient became hypotensive, respiratory status declined, and required massive transfusion (PRBC-11 units, FFP-4 units, Platelets-1 unit). Had stabilized prior to ischemic stroke secondary to acute PE. Electrolyte Disturbances: Hypokalemia, maintain > 4.0. Hypomagnesemia, maintain > 2.0. Hypocalcemia, trend ionized calcium. Will replace as needed. Non-Insulin Dependent DM2: A1c from 8/17/22 7.0, controlled for her age. Patient on no medication per chart review. SSI started as glucose numbers have been elevated 2/2 to stress hormones on the body. Will monitor with accu-checks. Idiopathic Cardiomyopathy and HFrEF: Transesophageal ECHO on 6/23/22 demonstrated severe global hypokinesis. EF 30-35%, Left atrium size severely dilated with no thrombus. Hypothyroidism: TSH normal in 5/22. On home synthroid. INITIAL H AND P AND ICU COURSE:  Patient is 80year old female with PMHx of Severe Mitral Regurgitation, Pulmonary HTN, Multivessel Disease, DM2, HTN, Idiopathic Cardiomyopathy, HFrEF, Hypothyroidism. Patient presented to [facility]14 for MitraClip procedure due to severe mitral regurgitation noted on previous diagnostic cath in August 2022. During procedure, massive hemorrhagic bleed noted that led to Cardiac Tamponade. At that time, the patient became hypotensive (SBP in 40's), Tachycardic ('s), and respiratory status decreased. Patient presented to ICU at this time, patient was in need of massive transfusion. Levo and Epi started pericardial drain was placed and atrium placed at -40, later adjusted to -10 with better results. Patient noted to have left ventricular dysfunction with low EF.  Patient to return back to Cath lab for planned transition to intra-aortic balloon pump to augment support. 9/15:  Patient is off pressor support and became hypertensive overnight. Started on Nitroglycerin, has since been stopped. Discussed case with Dr. Naveen Mason, will plan to give Albumin and additional unit of PRBC's to help with CVP. Plan to remove IABP today and possible extubation after procedure. Will leave pericardial drain in until patient has < 50 cc/24 hrs.      9/16:  Patient extubated last evening successfully without issues. Potassium and magnesium replaced this AM. Pericardial drain had minimal drainage overnight, per cardiology will switch pericardial drain from suction to gravity at noon. Patient no longer intubated and without pressors, will plan to transfer to  under our care, will not transfer out of our care over the weekend. 9/17:  Patient afebrile overnight and stable vital signs noted. Pericardial drain with gravity bag in place, no noted output overnight. Minimal drainage in bag this AM. Toribio catheter removed over the evening and patient able to get up and use restroom with good urine output. WEI has resolved. Dr. Naveen Mason following. Patient to remain under our service over the weekend, not to be transferred. Later in the morning, patient went into Afib with RVR. No hx of Afib. Patient was having no symptoms. Became hypotensive. EKG confirmed Afib with RVR with incomplete left bundle branch block. Dr. Naveen Mason also contacted, wants 1 unit PRBC's given and ECHO to be done. Neosynephrine ordered for hypotension. 9/19: 9/19/2022 she went into bradycardia and V. fib. A code was run. She was shocked twice. 1 synchronous and  1 nonsynchronous. He was given epinephrine, sodium bicarb's, amiodarone, and she was given 1 unit of packed red cells. She is currently on vasopressin.  Neurology asked to withhold Versed because of EEG monitoring.    9/20:  Overnight, patient developed worsening hypotension with BP drop from 120/46 to 47/36 and CVP increase from 15 to 26. Patient was tachycardic. Point of Care U/S demonstrated persistent small pericardial effusion with LVEF 15-20%, very minimal RV wall motion appreciated, secondary to progression of PE? Possible Right sided MI? CTA chest not done due to elevated creatinine, case discussed with IR and decided to not proceed. Patient remains on Levo, Franc, Epi, and Vaso. Will wean Epi first as tolerated. Pericardial drain 130cc overnight. Trend LA. Case continues to be discussed with Dr. Joaquín Brand. Family contacted, plans to come in today, will discuss overall case/picture with them. Past Medical History: Same as HPI  Family History: Mother hypertension, diabetes, heart disease. Father lung disease. Social History: Non-smoker, denies alcohol use, denies drug use. ROS   Unable to attain at this time     Scheduled Meds:   clopidogrel  75 mg Oral Daily    levETIRAcetam  500 mg IntraVENous Q12H    pantoprazole  40 mg IntraVENous BID    insulin lispro  0-16 Units SubCUTAneous TID WC    insulin lispro  0-4 Units SubCUTAneous Nightly    sodium chloride flush  5-40 mL IntraVENous 2 times per day    atorvastatin  10 mg Oral Daily    levothyroxine  100 mcg Oral Daily    calcium replacement protocol   Other RX Placeholder     Continuous Infusions:   norepinephrine 30 mcg/min (09/20/22 0007)    phenylephrine (FRANC-SYNEPHRINE) 50mg/250mL infusion 30 mcg/min (09/20/22 5339)    vasopressin (Septic Shock) infusion 0.04 Units/min (09/20/22 6987)    sodium chloride Stopped (09/20/22 0334)    sodium chloride      sodium chloride      heparin (PORCINE) Infusion 20 Units/kg/hr (09/20/22 3223)    dextrose         PHYSICAL EXAMINATION:  T: 99.3 (37.4)  P: 109. RR: 18. B/P: 122/59. FiO2: 50% O2 Sat: 100. I/O: 24h +651  Body mass index is 21.34 kg/m². GCS: 5  PC: 14/6: TV: 519 RRTotal: 12 Ti:1 sec  General:  Elderly female, intubated, acutely ill, pale. HEENT:  normocephalic and atraumatic. No scleral icterus. PERR  Neck: supple.   No Thyromegaly. Lungs: clear to auscultation. No retractions  Cardiac: Sinus tachycardia. No JVD. Abdomen: soft. Nontender. Extremities:  No clubbing, Cyanosis, or edema x 4. Vasculature: capillary refill < 3 seconds. Palpable dorsalis pedis pulses. Skin:  warm and dry. Lymph:  No supraclavicular adenopathy. PSYCH: Unable to assess at this time  Neurologic: No seizures. Does not follow commands. Data: (All radiographs, tracings, PFTs, and imaging are personally viewed and interpreted unless otherwise noted). Sodium 146 potassium 3.9 chloride 103 bicarb 26 BUN 56 creatinine 2.2 anion gap 20 estimated GFR 21 magnesium 2.7 lactic acid 4.6 glucose 194  WBC 12.2 hemoglobin 7.8 platelets 286  Telemetry shows sinus tachycardia  pH 7.54 PCO2 34 PO2 106 bicarb is 29 O2 sats 100  CVP 17 cm/h2o  MRI following 9/18/2022 menstruated MRI (without contrast, demonstrated restricted diffusion in the left frontal, left parietal, right parietal, left posterior temporal and right posterior temporal/occipital, and left occipital lobes and in the right and left cerebellar hemisphere with corresponding diminished signal.  Venous Doppler from 9/18/2022 demonstrated DVT in the distal right femoral vein with a peroneal vein. Also a small focus of acute/subacute DVT mid distal left femoral vein. Seen with multidisciplinary ICU team Yes  Meets Continued ICU Level Care Criteria:    [x] Yes   [] No - Transfer Planned to listed location:  [] HOSPITALIST CONTACTED-      Case and plan discussed with Dr. Marisabel Clinton. Electronically signed by DO Sergio Duke   Patient seen by me including key components of medical care. Case discussed with Dr. Ferny Clark. No recoverability. Family agreed to withdraw care. Italicized font, if present,  represents changes to the note made by me. CC time 35 minutes. Time was discontiguous. Time does not include procedure.  Time does include my direct assessment of the patient and coordination of care. Time represents more than 50% of the time involved with patient care by the [address] [place] [address] team.  Electronically signed by Ramon Syed.  [place] MD.

## 2022-09-20 NOTE — PROGRESS NOTES
[address] [facility] THERAPY MISSED TREATMENT NOTE  [facility] ICU 4D  4D-15/015-A      Date: 2022  Patient Name: Jovita Beckwith        CSN: [id number]   : 1940  (80 y.o.)  Gender: female   Referring Practitioner: Debbie Cano MD  Diagnosis: Mitral Leaflet abnormality         REASON FOR MISSED TREATMENT:  per discussion with PT; OT to discharge patient from caseload-not medically stable for therapy / early mobility at this time. OT to discharge patient and wait for new orders when patient appropriate for activity.

## 2022-09-20 NOTE — SIGNIFICANT EVENT
Called to bedside by nursing for worsening hypotension. RN reported sudden drop in BP from 120/46 to 47/36 with CVP increased from 15 to 26. Pericardial drain showed continuous output and actively draining at time of event. Patient tachycardic on monitor. POCUS showed persistent small pericardial effusion with LVEF estimated at 15-20%. Minimal RV apical wall motion decreased from previous BANDAR and TTE images. A review of the chart showed patient heparin dosing had been subtherapeutic since 9/17/22. EKG showed new T wave inversions in lateral leads however no other changes. At this time suspect worsening RV function due to progression of PE or possible Rt sided MI    Elevated Cr at this time precluded CTA scanning for progression of PE. Discussed patient case with Dr Kevon Poole in interventional radiology. Given patient's pericardial effusion frail status, recent PEA arrest x2 and DNR-CCA status believed that benefits did not outweigh risks at this time given diagnosis unconfirmed. Patient currently hemodynamically stable on NE 25 mcg and Epinephrine 5 mcg.  Will discuss case with AM team and cardiology

## 2022-09-20 NOTE — PROCEDURES
LONG-TERM EEG-VIDEO [address]    Patient: Ross Lynch  Age: 80 y.o. MRN: [id number]    Referring Physician: Ana Maria Mcclain MD  History: The patient is a 80 y.o. female who presented breakthrough seizure/encephalopathy. This long-term video-EEG monitoring study was performed to determine the nature of the patient's clinical events. The patient is on neuroactive medications.    Ross Lynch   Current Facility-Administered Medications   Medication Dose Route Frequency Provider Last Rate Last Admin    norepinephrine (LEVOPHED) 16 mg in sodium chloride 0.9 % 250 mL infusion  1-100 mcg/min IntraVENous Continuous Hue Mccray MD 46.9 mL/hr at 09/20/22 0949 50 mcg/min at 09/20/22 0949    phenylephrine (FRANC-SYNEPHRINE) 50 mg in dextrose 5 % 250 mL infusion   mcg/min IntraVENous Continuous Hue Mccray MD 9 mL/hr at 09/20/22 1021 30 mcg/min at 09/20/22 1021    vasopressin 20 Units in dextrose 5 % 100 mL infusion  0.04 Units/min IntraVENous Continuous Hue Mccray MD 12 mL/hr at 09/20/22 0949 0.04 Units/min at 09/20/22 0949    EPINEPHrine 5 mg in in dextrose 5% 250 ml infusion  1-20 mcg/min IntraVENous Continuous Hue Mccray MD   Stopped at 09/20/22 0950    clopidogrel (PLAVIX) tablet 75 mg  75 mg Oral Daily Crista Nick MD   75 mg at 09/20/22 0908    0.9 % sodium chloride infusion   IntraVENous Continuous Crista Nick MD   Stopped at 09/20/22 0334    0.9 % sodium chloride infusion   IntraVENous PRN Daksha Ayala MD        levETIRAcetam (KEPPRA) 500 mg in sodium chloride 0.9 % 100 mL IVPB  500 mg IntraVENous Q12H Rebel Espinal PA-C   Stopped at 09/20/22 0930    LORazepam (ATIVAN) injection 1 mg  1 mg IntraVENous Q6H PRN Jesse Dias PA-C        0.9 % sodium chloride infusion   IntraVENous PRN Crista Nick MD        heparin (porcine) injection 4,290 Units  80 Units/kg IntraVENous PRN Lavinia Melendez APRN - CNP        heparin (porcine) injection 2,140 Units  40 Units/kg IntraVENous PRN Laviniacarolyn Melendez APRN - CNP   2,140 Units at 09/19/22 2355    heparin 25,000 units in dextrose 5% 250 mL (premix) infusion  5-30 Units/kg/hr IntraVENous Continuous Lavinia Nora APRN - CNP 10.7 mL/hr at 09/20/22 0525 20 Units/kg/hr at 09/20/22 0525    pantoprazole (PROTONIX) injection 40 mg  40 mg IntraVENous BID Shoshana Trevino MD   40 mg at 09/20/22 0908    insulin lispro (HUMALOG) injection vial 0-16 Units  0-16 Units SubCUTAneous TID WC [place]ak[place] Males, DO   8 Units at 09/19/22 1835    insulin lispro (HUMALOG) injection vial 0-4 Units  0-4 Units SubCUTAneous Nightly [place]ne Males, DO        sodium chloride flush 0.9 % injection 5-40 mL  5-40 mL IntraVENous 2 times per day Kilo Gaytan PA-C   10 mL at 09/20/22 0915    sodium chloride flush 0.9 % injection 5-40 mL  5-40 mL IntraVENous PRN Kilo Gaytan PA-C        ondansetron (ZOFRAN-ODT) disintegrating tablet 4 mg  4 mg Oral Q8H PRN [place] Males, DO        Or    ondansetron (ZOFRAN) injection 4 mg  4 mg IntraVENous Q6H PRN [place] Males, DO        polyethylene glycol (GLYCOLAX) packet 17 g  17 g Oral Daily PRN [place]ne Males, DO        acetaminophen (TYLENOL) tablet 650 mg  650 mg Oral Q6H PRN [place] Males, DO        Or    acetaminophen (TYLENOL) suppository 650 mg  650 mg Rectal Q6H PRN [place] Males, DO        atorvastatin (LIPITOR) tablet 10 mg  10 mg Oral Daily SHANNON Bernal - CNP   10 mg at 09/19/22 2103    levothyroxine (SYNTHROID) tablet 100 mcg  100 mcg Oral Daily SHANNON Bernal - CNP   100 mcg at 09/20/22 0908    fentaNYL (SUBLIMAZE) injection 25 mcg  25 mcg IntraVENous Q1H PRN Angle Mayen APRN - CNP   25 mcg at 09/15/22 1306    calcium replacement protocol   Other RX Placeholder Angle Mayen, APRN - CNP        glucose chewable tablet 16 g  4 tablet Oral PRN Angle Cruz Shawl, APRN - CNP        dextrose bolus 10% 125 mL  125 mL IntraVENous PRN Angle Crozier Shawl, APRN - CNP        Or    dextrose bolus 10% 250 mL  250 mL IntraVENous PRN Angle Cruz Shawl, APRN - CNP        glucagon (rDNA) injection 1 mg  1 mg SubCUTAneous PRN Angle Crozier Shawl, APRN - CNP        dextrose 10 % infusion   IntraVENous Continuous PRN Angle Crozier Shawl, APRN - CNP         Technical Description: This is a 21-channel digital EEG recording with time-locked video. Electrodes were placed in accordance with the 10-20 International System of Electrode Placement. Single lead EKG monitoring was included. Baseline EEG Recording:  A formal baseline EEG recording was not obtained. Day 1 - 9/19/22, starting at 12:20    Interictal EEG Samples:  IThe dominant background activity consisted of 4-5 Hz of polymorphic delta and theta activity of 25 to 35 µV lasting for 3 to 4 seconds followed by diffuse attenuation lasting for 4 to 5 seconds. Prominent bifrontal and bitemporal EMG artifact was seen. During behavioral sleep, sleep spindles were seen symmetric over both hemispheres. Occasional right central and parietal sharp waves were seen at C4 and P4 the EKG channel revealed no abnormalities. Ictal EEG Recording / Patient Events: During this period the patient had no events or seizures. Summary: During this day of recording no events were recorded. This EEG was abnormal. Disorganized and slow background and frequent periods of attenuation suggested moderate to severe encephalopathy. Occasional right central and parietal sharp waves conferred an increased risk for focal onset seizures. Monitoring was continued in order to record the patient's typical events. The EKG channel revealed no abnormalities.     Day 2 - 9/20/22, reviewed through 7:30 am    Interictal EEG Samples: Interictal EEG was unchanged from yesterday. Ictal EEG Recording / Patient Events: During this period the patient had no events or seizures. Summary: During this day of recording no events were recorded. This EEG was abnormal. Disorganized and slow background and frequent periods of attenuation suggested moderate to severe encephalopathy. Occasional right central and parietal sharp waves conferred an increased risk for focal onset seizures. Monitoring was continued in order to record the patient's typical events. The EKG channel revealed no abnormalities. Bigg Veloz MD  Diplomate, American Board of Psychiatry and Neurology  Diplomate, American Board of Clinical Neurophysiology  Diplomate, American Board of Epilepsy         Please note this is a preliminary report and updated daily. The final report will have a summary of behavior and electrographic findings with clinical correlation.

## 2022-09-20 NOTE — PLAN OF CARE
Problem: Respiratory - Adult  Goal: Will be able to breathe spontaneously, without ventilator support  Description: Will be able to breathe spontaneously, without ventilator support  Outcome: Progressing

## 2022-09-20 NOTE — SIGNIFICANT EVENT
Spoke with sister Chloe Steen via telephone. I explained patient's current situation she does wish to make her sister comfortable. She states her and her brother will be coming to the hospital.  Updated primary nurse. Electronically signed by Chang Robert.  Mariah Curry CNP on 9/20/2022 at 11:24 AM

## 2022-09-21 ENCOUNTER — APPOINTMENT (OUTPATIENT)
Dept: CARDIAC REHAB | Age: 82
End: 2022-09-21
Payer: MEDICARE

## 2022-09-21 NOTE — DISCHARGE SUMMARY
CRITICAL CARE DISCHARGE SUMMARY      Patient:  Daron Morris                    Unit/Bed:4D-15/015-A  YOB: 1940  MRN: [id number]            PCP: Rubens Norman MD  Date of Admission: 9/14/2022    Date of Death - 9/20/22  Time of Death - 46  Cause of Death - Cardiac Arrest complicated by massive PE, cardiac tamponade, and cardiogenic shock     Assessment and Plan:    Acute Hypoxic Respiratory Failure:   Ischemic stroke:   Cardiac Arrest:   Pulmonary embolism/DVT:    Cardiac Tamponade:   Cardiogenic shock:   Torsades Kentrell Pointes:    Lactic Acidosis:   Shock Liver:   Leukocytosis:    Normocytic Anemia:   New Onset Afib with RVR:    Hypotension:   WEI on CKD Stage 3b:   Seizure Activity:   Severe Mitral Regurgitation:   Multivessel Disease/Severe Pulm HTN:   Hypovolemic Shock:   Electrolyte Disturbances:   Non-Insulin Dependent DM2:   Idiopathic Cardiomyopathy and HFrEF:    Hypothyroidism:      INITIAL H AND P AND ICU COURSE:  Patient is 80year old female with PMHx of Severe Mitral Regurgitation, Pulmonary HTN, Multivessel Disease, DM2, HTN, Idiopathic Cardiomyopathy, HFrEF, Hypothyroidism. Patient presented to [facility] on 9/14 for MitraClip procedure due to severe mitral regurgitation noted on previous diagnostic cath in August 2022. During procedure, massive hemorrhagic bleed noted that led to Cardiac Tamponade. At that time, the patient became hypotensive (SBP in 40's), Tachycardic ('s), and respiratory status decreased. Patient presented to ICU at this time, patient was in need of massive transfusion. Levo and Epi started pericardial drain was placed and atrium placed at -40, later adjusted to -10 with better results. Patient noted to have left ventricular dysfunction with low EF. Patient to return back to Cath lab for planned transition to intra-aortic balloon pump to augment support. 9/15:  Patient is off pressor support and became hypertensive overnight.  Started on Nitroglycerin, has since been stopped. Discussed case with Dr. Chun Randhawa, will plan to give Albumin and additional unit of PRBC's to help with CVP. Plan to remove IABP today and possible extubation after procedure. Will leave pericardial drain in until patient has < 50 cc/24 hrs.      9/16:  Patient extubated last evening successfully without issues. Potassium and magnesium replaced this AM. Pericardial drain had minimal drainage overnight, per cardiology will switch pericardial drain from suction to gravity at noon. Patient no longer intubated and without pressors, will plan to transfer to  under our care, will not transfer out of our care over the weekend. 9/17:  Patient afebrile overnight and stable vital signs noted. Pericardial drain with gravity bag in place, no noted output overnight. Minimal drainage in bag this AM. Toribio catheter removed over the evening and patient able to get up and use restroom with good urine output. WEI has resolved. Dr. Chun Randhawa following. Patient to remain under our service over the weekend, not to be transferred. Later in the morning, patient went into Afib with RVR. No hx of Afib. Patient was having no symptoms. Became hypotensive. EKG confirmed Afib with RVR with incomplete left bundle branch block. Dr. Chun Randhawa also contacted, wants 1 unit PRBC's given and ECHO to be done. Neosynephrine ordered for hypotension. 9/19: 9/19/2022 she went into bradycardia and V. fib. A code was run. She was shocked twice. 1 synchronous and  1 nonsynchronous. He was given epinephrine, sodium bicarb's, amiodarone, and she was given 1 unit of packed red cells. She is currently on vasopressin. Neurology asked to withhold Versed because of EEG monitoring.     9/20:  Overnight, patient developed worsening hypotension with BP drop from 120/46 to 47/36 and CVP increase from 15 to 26. Patient was tachycardic.  Point of Care U/S demonstrated persistent small pericardial effusion with LVEF 15-20%, very minimal RV wall motion appreciated, secondary to progression of PE? Possible Right sided MI? CTA chest not done due to elevated creatinine, case discussed with IR and decided to not proceed. Patient remains on Levo, Noe, Epi, and Vaso. Will wean Epi first as tolerated. Pericardial drain 130cc overnight. Trend LA. Case continues to be discussed with Dr. Ashley Muniz. Family contacted, plans to come in today, will discuss overall case/picture with them. Decided to extubate on this date.         Electronically signed by Silvestre Wetzel,   [address]

## 2022-09-22 ENCOUNTER — APPOINTMENT (OUTPATIENT)
Dept: CARDIAC REHAB | Age: 82
End: 2022-09-22
Payer: MEDICARE

## 2022-09-26 ENCOUNTER — APPOINTMENT (OUTPATIENT)
Dept: CARDIAC REHAB | Age: 82
End: 2022-09-26
Payer: MEDICARE

## 2022-09-28 ENCOUNTER — APPOINTMENT (OUTPATIENT)
Dept: CARDIAC REHAB | Age: 82
End: 2022-09-28
Payer: MEDICARE

## 2022-09-29 ENCOUNTER — APPOINTMENT (OUTPATIENT)
Dept: CARDIAC REHAB | Age: 82
End: 2022-09-29
Payer: MEDICARE

## 2023-02-03 ENCOUNTER — TELEPHONE (OUTPATIENT)
Dept: CARDIOLOGY CLINIC | Age: 83
End: 2023-02-03

## 2023-02-03 NOTE — TELEPHONE ENCOUNTER
Accessed patient's chart due to Mitral Clip done on 9/14/2022. Patient's chart reviewed from procedure and admission for registry. STS completed and then chart closed. STS scanned in and registry notified. Female

## 2023-09-25 NOTE — PROGRESS NOTES
[address] THERAPY MISSED TREATMENT NOTE  [facility] ICU 4D  4D-15/015-A      Date: 2022  Patient Name: Flavio Cisneros        CSN: [id number]   : 1940  (80 y.o.)  Gender: female   Referring Practitioner: Yvette Newton MD  Diagnosis: Mitral Leaflet abnormality         REASON FOR MISSED TREATMENT: Hold Treatment per Nursing. Pt remains intubated, only responding/withdrawing to pain per RN report. Amaryllis Coats in place. Not appropriate for early mobility at this time.  Will check back as able Anesthesia Post-op Note    Patient: Nnamdi Palacios  Procedure(s) Performed: COLONOSCOPY  Anesthesia type: MAC    Vitals Value Taken Time   Temp 36.3 °C (97.4 °F) 09/25/23 1124   Pulse 64 09/25/23 1124   Resp 16 09/25/23 1124   SpO2 97 % 09/25/23 1124   /58 09/25/23 1124   Vitals shown include unvalidated device data.      Patient Location: Phase II  Post-op Vital Signs:stable  Level of Consciousness: awake and participates in exam  Respiratory Status: spontaneous ventilation, unassisted and room air  Cardiovascular stable and blood pressure returned to baseline  Hydration: euvolemic  Pain Management: adequately controlled  Vomiting: none  Nausea: None  Airway Patency:patent  Post-op Assessment: awake, alert, appropriately conversant, or baseline, no complications, patient tolerated procedure well, no evidence of recall, dentition within defined limits, moving all extremities and No Corneal Abrasion  Comments: Patient reassessed and appropriate for discharge       There were no known notable events for this encounter.

## (undated) DEVICE — GLOVE ORTHO 8   MSG9480